# Patient Record
Sex: FEMALE | Race: BLACK OR AFRICAN AMERICAN | Employment: UNEMPLOYED | URBAN - METROPOLITAN AREA
[De-identification: names, ages, dates, MRNs, and addresses within clinical notes are randomized per-mention and may not be internally consistent; named-entity substitution may affect disease eponyms.]

---

## 2017-02-16 DIAGNOSIS — G40.019 LOCALIZATION-RELATED EPILEPSY, INTRACTABLE (HCC): ICD-10-CM

## 2017-02-17 RX ORDER — LACOSAMIDE 150 MG/1
TABLET, FILM COATED ORAL
Qty: 60 TAB | Refills: 2 | Status: SHIPPED | OUTPATIENT
Start: 2017-02-17 | End: 2017-02-20 | Stop reason: SDUPTHER

## 2017-02-20 ENCOUNTER — OFFICE VISIT (OUTPATIENT)
Dept: NEUROLOGY | Age: 62
End: 2017-02-20

## 2017-02-20 VITALS
WEIGHT: 180.4 LBS | SYSTOLIC BLOOD PRESSURE: 142 MMHG | OXYGEN SATURATION: 100 % | HEART RATE: 86 BPM | DIASTOLIC BLOOD PRESSURE: 92 MMHG | RESPIRATION RATE: 20 BRPM | HEIGHT: 63 IN | BODY MASS INDEX: 31.96 KG/M2

## 2017-02-20 DIAGNOSIS — G40.019 LOCALIZATION-RELATED EPILEPSY, INTRACTABLE (HCC): Primary | ICD-10-CM

## 2017-02-20 RX ORDER — LACOSAMIDE 150 MG/1
TABLET ORAL
Qty: 60 TAB | Refills: 5 | Status: SHIPPED | OUTPATIENT
Start: 2017-02-20 | End: 2017-08-14 | Stop reason: SDUPTHER

## 2017-02-20 RX ORDER — LEVETIRACETAM 1000 MG/1
TABLET ORAL
Qty: 210 TAB | Refills: 5 | Status: SHIPPED | OUTPATIENT
Start: 2017-02-20 | End: 2017-08-14 | Stop reason: SDUPTHER

## 2017-02-20 NOTE — MR AVS SNAPSHOT
Visit Information Date & Time Provider Department Dept. Phone Encounter #  
 2/20/2017  8:30 AM Henok Peralta NP Neurology [phone]  Upcoming Health Maintenance Date Due Hepatitis C Screening 1955 Pneumococcal 19-64 Medium Risk (1 of 1 - PPSV23) 11/18/1975 DTaP/Tdap/Td series (1 - Tdap) 11/18/1977 PAP AKA CERVICAL CYTOLOGY 11/18/1977 FOBT Q 1 YEAR AGE 50-75 11/18/2006 INFLUENZA AGE 9 TO ADULT 8/1/2016 ZOSTER VACCINE AGE 60> 11/18/2016 BREAST CANCER SCRN MAMMOGRAM 2/23/2018 Allergies as of 2/20/2017  Review Complete On: 2/20/2017 By: Henok Peralta NP No Known Allergies Current Immunizations  Never Reviewed No immunizations on file. Not reviewed this visit You Were Diagnosed With   
  
 Codes Comments Localization-related epilepsy, intractable ([facility].)    -  Primary ICD-10-CM: X58.254 ICD-9-CM: 345.51 Vitals BP Pulse Resp Height(growth percentile) Weight(growth percentile) SpO2  
 (!) 142/92 86 20 5' 2.5\" (1.588 m) 180 lb 6.4 oz (81.8 kg) 100% BMI OB Status Smoking Status 32.47 kg/m2 Hysterectomy Current Every Day Smoker Vitals History BMI and BSA Data Body Mass Index Body Surface Area  
 32.47 kg/m 2 1.9 m 2 Preferred Pharmacy Pharmacy Name Phone [facility] [address] [place] [address] [phone] Your Updated Medication List  
  
   
This list is accurate as of: 2/20/17  8:56 AM.  Always use your most recent med list.  
  
  
  
  
 acetaminophen 325 mg tablet Commonly known as:  TYLENOL Take  by mouth every four (4) hours as needed for Pain. albuterol 90 mcg/actuation inhaler Commonly known as:  PROVENTIL HFA, VENTOLIN HFA, PROAIR HFA Take 1-2 puffs by inhalation every four (4) hours as needed for Wheezing. amLODIPine 5 mg tablet Commonly known as:  Larue Sarah Take 5 mg by mouth daily. BONIVA 150 mg tablet Generic drug:  ibandronate Take 150 mg by mouth every thirty (30) days. calcium carbonate 200 mg calcium (500 mg) Chew Commonly known as:  TUMS Take 1 Tab by mouth two (2) times a day. FISH OIL 1,000 mg Cap Generic drug:  omega-3 fatty acids-vitamin e Take 1 Cap by mouth. 3 caps daily  
  
 hydroCHLOROthiazide 12.5 mg tablet Commonly known as:  HYDRODIURIL  
  
 lacosamide 150 mg Tab tablet Commonly known as:  VIMPAT TAKE 1 TABLET BY MOUTH TWICE DAILY  
  
 levETIRAcetam 1,000 mg tablet TAKE 3 AND 1/2 TABLETS BY MOUTH TWICE DAILY. Same generic for each refill  
  
 montelukast 10 mg tablet Commonly known as:  SINGULAIR  
daily. multivitamin tablet Commonly known as:  ONE A DAY Take 1 Tab by mouth daily. valsartan 80 mg tablet Commonly known as:  DIOVAN Take 80 mg by mouth daily. VITAMIN D2 PO Take 1,000 Units by mouth daily. Prescriptions Printed Refills  
 lacosamide (VIMPAT) 150 mg tab tablet 5 Sig: TAKE 1 TABLET BY MOUTH TWICE DAILY Class: Print Prescriptions Sent to Pharmacy Refills  
 levETIRAcetam 1,000 mg tablet 5 Sig: TAKE 3 AND 1/2 TABLETS BY MOUTH TWICE DAILY. Same generic for each refill Class: Normal  
 Pharmacy: [facility] [address] [place] [address] #: [phone] To-Do List   
 03/01/2017 3:00 PM  
  Appointment with [facility] at [facility] ([phone]) Shower or bathe using soap and water. Do not use deodorant, powder, perfumes, or lotion the day of your exam.  If your prior mammograms were not performed at [facility] 6 please bring films with you or forward prior images 2 days before your procedure.   Check in at registration 15min before your appointment time unless you were instructed to do otherwise. A script is not necessary, but if you have one, please bring it on the day of the mammogram or have it faxed to the department. [facility] [phone] [facility]  [phone] [facility] [address]  [phone] [facility] [phone] [address] [facility] Trixie Mcdonnell [phone] Patient Instructions A Healthy Lifestyle: Care Instructions Your Care Instructions A healthy lifestyle can help you feel good, stay at a healthy weight, and have plenty of energy for both work and play. A healthy lifestyle is something you can share with your whole family. A healthy lifestyle also can lower your risk for serious health problems, such as high blood pressure, heart disease, and diabetes. You can follow a few steps listed below to improve your health and the health of your family. Follow-up care is a key part of your treatment and safety. Be sure to make and go to all appointments, and call your doctor if you are having problems. Its also a good idea to know your test results and keep a list of the medicines you take. How can you care for yourself at home? · Do not eat too much sugar, fat, or fast foods. You can still have dessert and treats now and then. The goal is moderation. · Start small to improve your eating habits. Pay attention to portion sizes, drink less juice and soda pop, and eat more fruits and vegetables. ¨ Eat a healthy amount of food. A 3-ounce serving of meat, for example, is about the size of a deck of cards. Fill the rest of your plate with vegetables and whole grains. ¨ Limit the amount of soda and sports drinks you have every day. Drink more water when you are thirsty. ¨ Eat at least 5 servings of fruits and vegetables every day. It may seem like a lot, but it is not hard to reach this goal. A serving or helping is 1 piece of fruit, 1 cup of vegetables, or 2 cups of leafy, raw vegetables.  Have an apple or some carrot sticks as an afternoon snack instead of a candy bar. Try to have fruits and/or vegetables at every meal. 
· Make exercise part of your daily routine. You may want to start with simple activities, such as walking, bicycling, or slow swimming. Try to be active 30 to 60 minutes every day. You do not need to do all 30 to 60 minutes all at once. For example, you can exercise 3 times a day for 10 or 20 minutes. Moderate exercise is safe for most people, but it is always a good idea to talk to your doctor before starting an exercise program. 
· Keep moving. Hina Parody the lawn, work in the garden, or [employer]. Take the stairs instead of the elevator at work. · If you smoke, quit. People who smoke have an increased risk for heart attack, stroke, cancer, and other lung illnesses. Quitting is hard, but there are ways to boost your chance of quitting tobacco for good. ¨ Use nicotine gum, patches, or lozenges. ¨ Ask your doctor about stop-smoking programs and medicines. ¨ Keep trying. In addition to reducing your risk of diseases in the future, you will notice some benefits soon after you stop using tobacco. If you have shortness of breath or asthma symptoms, they will likely get better within a few weeks after you quit. · Limit how much alcohol you drink. Moderate amounts of alcohol (up to 2 drinks a day for men, 1 drink a day for women) are okay. But drinking too much can lead to liver problems, high blood pressure, and other health problems. Family health If you have a family, there are many things you can do together to improve your health. · Eat meals together as a family as often as possible. · Eat healthy foods. This includes fruits, vegetables, lean meats and dairy, and whole grains. · Include your family in your fitness plan. Most people think of activities such as jogging or tennis as the way to fitness, but there are many ways you and your family can be more active.  Anything that makes you breathe hard and gets your heart pumping is exercise. Here are some tips: 
¨ Walk to do errands or to take your child to school or the bus. ¨ Go for a family bike ride after dinner instead of watching TV. Where can you learn more? Go to http://ronny-janey.info/. Enter A035 in the search box to learn more about \"A Healthy Lifestyle: Care Instructions. \" Current as of: July 26, 2016 Content Version: 11.1 © 6234-6417 [employer]. Care instructions adapted under license by [employer] (which disclaims liability or warranty for this information). If you have questions about a medical condition or this instruction, always ask your healthcare professional. [address] any warranty or liability for your use of this information. Introducing [facility] & HEALTH SERVICES! Dear Reynaldo Vila: Thank you for requesting a Nexavis account. Our records indicate that you already have an active Nexavis account. You can access your account anytime at [email or web address] Did you know that you can access your hospital and ER discharge instructions at any time in Nexavis? You can also review all of your test results from your hospital stay or ER visit. Additional Information If you have questions, please visit the Frequently Asked Questions section of the Nexavis website at [email or web address] Remember, Nexavis is NOT to be used for urgent needs. For medical emergencies, dial 911. Now available from your iPhone and Android! Please provide this summary of care documentation to your next provider. Your primary care clinician is listed as Jake Walters. If you have any questions after today's visit, please call [phone].

## 2017-02-20 NOTE — PROGRESS NOTES
Date:  2017    Name:  Nancy Medeiros  :  1955  MRN:  [id number]     PCP:  Daren Fleischer., MD    Chief Complaint   Patient presents with    Epilepsy     HISTORY OF PRESENT ILLNESS: Follow up for epilepsy. Despite missing a couple of doses around the time that her father passed away, she has not had any seizures or seizure like activity. She denies having any seizures, loss of consciousness, dizziness, headaches, disturbance of sensation, changes in bowel or bladder, unusual fatigue, pain, chest pain or shortness of breath. States that she just has not felt good since her father . Current Outpatient Prescriptions   Medication Sig    VIMPAT 150 mg tab tablet TAKE 1 TABLET BY MOUTH TWICE DAILY    levETIRAcetam 1,000 mg tablet TAKE 3 AND 1/2 TABLETS BY MOUTH TWICE DAILY    Hydrochlorothiazide (HYDRODIURIL) 12.5 mg tablet     acetaminophen (TYLENOL) 325 mg tablet Take  by mouth every four (4) hours as needed for Pain.  amLODIPine (NORVASC) 5 mg tablet Take 5 mg by mouth daily.  valsartan (DIOVAN) 80 mg tablet Take 80 mg by mouth daily.  montelukast (SINGULAIR) 10 mg tablet daily.  albuterol (PROVENTIL HFA, VENTOLIN HFA, PROAIR HFA) 90 mcg/actuation inhaler Take 1-2 puffs by inhalation every four (4) hours as needed for Wheezing.  ibandronate (BONIVA) 150 mg tablet Take 150 mg by mouth every thirty (30) days.  calcium carbonate (TUMS) 200 mg calcium (500 mg) chew Take 1 Tab by mouth two (2) times a day.  multivitamin (ONE A DAY) tablet Take 1 Tab by mouth daily.  omega-3 fatty acids-vitamin e (FISH OIL) 1,000 mg cap Take 1 Cap by mouth. 3 caps daily    ERGOCALCIFEROL, VITAMIN D2, (VITAMIN D PO) Take 1,000 Units by mouth daily. No current facility-administered medications for this visit.       No Known Allergies  Past Medical History   Diagnosis Date    Arthritis     Asthma     Cervical cancer ([address]     Colon cancer [facility])      2016  Fibromyalgia     GERD (gastroesophageal reflux disease)     Hypertension     Osteoporosis     Seizures ([address]      LAST SEIZURE 2013    Ulcer [facility])      Past Surgical History   Procedure Laterality Date    Hx hysterectomy      Pr neurological procedure unlisted  2009     brain surgery for seizures    Hx colonoscopy  02/2016     Social History     Social History    Marital status:      Spouse name: N/A    Number of children: N/A    Years of education: N/A     Occupational History    Not on file. Social History Main Topics    Smoking status: Current Every Day Smoker     Packs/day: 0.50     Years: 35.00    Smokeless tobacco: Never Used    Alcohol use No    Drug use: No    Sexual activity: Not on file     Other Topics Concern    Not on file     Social History Narrative     Family History   Problem Relation Age of Onset    Dementia Mother     Cancer Mother      colon    Hypertension Mother     Dementia Father     Diabetes Father     Hypertension Father     Stroke Father     Crohn's Disease Father     Suicide Sister     Cancer Sister      breast       PHYSICAL EXAMINATION:    Visit Vitals    BP (!) 142/92    Pulse 86    Resp 20    Ht 5' 2.5\" (1.588 m)    Wt 81.8 kg (180 lb 6.4 oz)    SpO2 100%    BMI 32.47 kg/m2     General: Well defined, nourished, and groomed individual in no acute distress.    Neck: Supple, nontender, no bruits, no pain with resistance to active range of motion.    Heart: Regular rate and rhythm, no murmurs, rub, or gallop. Normal S1S2. Lungs: Clear to auscultation bilaterally with equal chest expansion, no cough, no wheeze  Musculoskeletal: Extremities revealed no edema and had full range of motion of joints.    Psych: Good mood and bright affect      NEUROLOGICAL EXAMINATION:    Mental Status: Alert and oriented to person, place, and time       Cranial Nerves:    II, III, IV, VI: Visual acuity grossly intact.  Visual fields are normal.    Pupils are equal, round, and reactive to light and accommodation.    Extra-ocular movements are full and fluid. Fundoscopic exam was benign, no ptosis or nystagmus.    V-XII: Hearing is grossly intact. Facial features are symmetric, with normal sensation and strength. The palate rises symmetrically and the tongue protrudes midline. Sternocleidomastoids 5/5.       Motor Examination: Normal tone, bulk, and strength, 5/5 muscle strength throughout.    Coordination: Finger to nose was normal. No resting or intention tremor  Gait and Station: Steady while walking. Normal arm swing. No pronator drift. No muscle wasting or fasiculations noted.    Reflexes: DTRs 2+ throughout    ASSESSMENT AND PLAN    ICD-10-CM ICD-9-CM    1. Localization-related epilepsy, intractable (HCC) G40.119 345.51 lacosamide (VIMPAT) 150 mg tab tablet      levETIRAcetam 1,000 mg tablet     Epilepsy is stable on present therapy of Vimpat and Keppra. Continue with the same . Follow up in six months    [facility].  Андрей Medina

## 2017-02-20 NOTE — PROGRESS NOTES
No seizures since last visit   She had missed like 2 doses back in December she had to go home since her daddy passed away   No breakthrough seizures because of the missed doses though

## 2017-02-20 NOTE — PATIENT INSTRUCTIONS

## 2017-03-15 DIAGNOSIS — G40.019 LOCALIZATION-RELATED EPILEPSY, INTRACTABLE (HCC): ICD-10-CM

## 2017-03-15 RX ORDER — LEVETIRACETAM 1000 MG/1
TABLET ORAL
Qty: 210 TAB | Refills: 0 | Status: SHIPPED | OUTPATIENT
Start: 2017-03-15 | End: 2017-08-14 | Stop reason: SDUPTHER

## 2017-04-05 ENCOUNTER — HOSPITAL ENCOUNTER (OUTPATIENT)
Dept: MAMMOGRAPHY | Age: 62
Discharge: HOME | End: 2017-04-05
Attending: FAMILY MEDICINE
Payer: MEDICARE

## 2017-04-05 DIAGNOSIS — Z12.31 VISIT FOR SCREENING MAMMOGRAM: ICD-10-CM

## 2017-04-05 PROCEDURE — 77067 SCR MAMMO BI INCL CAD: CPT

## 2017-08-14 ENCOUNTER — OFFICE VISIT (OUTPATIENT)
Dept: NEUROLOGY | Age: 62
End: 2017-08-14

## 2017-08-14 VITALS
DIASTOLIC BLOOD PRESSURE: 86 MMHG | BODY MASS INDEX: 31.63 KG/M2 | HEIGHT: 63 IN | WEIGHT: 178.5 LBS | HEART RATE: 74 BPM | SYSTOLIC BLOOD PRESSURE: 124 MMHG | OXYGEN SATURATION: 99 % | RESPIRATION RATE: 20 BRPM

## 2017-08-14 DIAGNOSIS — G40.019 LOCALIZATION-RELATED EPILEPSY, INTRACTABLE (HCC): Primary | ICD-10-CM

## 2017-08-14 RX ORDER — LEVETIRACETAM 1000 MG/1
TABLET ORAL
Qty: 210 TAB | Refills: 11 | Status: SHIPPED | OUTPATIENT
Start: 2017-08-14 | End: 2018-04-10 | Stop reason: SDUPTHER

## 2017-08-14 RX ORDER — LACOSAMIDE 150 MG/1
TABLET ORAL
Qty: 60 TAB | Refills: 11 | Status: SHIPPED | OUTPATIENT
Start: 2017-08-14 | End: 2018-02-25 | Stop reason: SDUPTHER

## 2017-08-14 NOTE — MR AVS SNAPSHOT
Visit Information Date & Time Provider Department Dept. Phone Encounter #  
 8/14/2017  8:30 AM Abena Harvey NP [facility] [phone] [id number] Follow-up Instructions Return in about 1 year (around 8/14/2018). Upcoming Health Maintenance Date Due Hepatitis C Screening 1955 Pneumococcal 19-64 Medium Risk (1 of 1 - PPSV23) 11/18/1975 DTaP/Tdap/Td series (1 - Tdap) 11/18/1977 PAP AKA CERVICAL CYTOLOGY 11/18/1977 FOBT Q 1 YEAR AGE 50-75 11/18/2006 ZOSTER VACCINE AGE 60> 9/18/2016 INFLUENZA AGE 9 TO ADULT 8/1/2017 BREAST CANCER SCRN MAMMOGRAM 4/5/2019 Allergies as of 8/14/2017  Review Complete On: 8/14/2017 By: Abena Harvey NP No Known Allergies Current Immunizations  Never Reviewed No immunizations on file. Not reviewed this visit You Were Diagnosed With   
  
 Codes Comments Localization-related epilepsy, intractable ([facility].)    -  Primary ICD-10-CM: H42.115 ICD-9-CM: 345.51 Vitals BP Pulse Resp Height(growth percentile) Weight(growth percentile) SpO2  
 124/86 74 20 5' 2.5\" (1.588 m) 178 lb 8 oz (81 kg) 99% BMI OB Status Smoking Status 32.13 kg/m2 Hysterectomy Current Every Day Smoker Vitals History BMI and BSA Data Body Mass Index Body Surface Area  
 32.13 kg/m 2 1.89 m 2 Preferred Pharmacy Pharmacy Name Phone [facility] [address] [phone] Your Updated Medication List  
  
   
This list is accurate as of: 8/14/17  8:56 AM.  Always use your most recent med list.  
  
  
  
  
 acetaminophen 325 mg tablet Commonly known as:  TYLENOL Take  by mouth every four (4) hours as needed for Pain. albuterol 90 mcg/actuation inhaler Commonly known as:  PROVENTIL HFA, VENTOLIN HFA, PROAIR HFA  
 Take 1-2 puffs by inhalation every four (4) hours as needed for Wheezing. amLODIPine 5 mg tablet Commonly known as:  Caralee Dupes Take 5 mg by mouth daily. BONIVA 150 mg tablet Generic drug:  ibandronate Take 150 mg by mouth every thirty (30) days. calcium carbonate 200 mg calcium (500 mg) Chew Commonly known as:  TUMS Take 1 Tab by mouth two (2) times a day. FISH OIL 1,000 mg Cap Generic drug:  omega-3 fatty acids-vitamin e Take 1 Cap by mouth. 3 caps daily  
  
 hydroCHLOROthiazide 12.5 mg tablet Commonly known as:  HYDRODIURIL  
  
 lacosamide 150 mg Tab tablet Commonly known as:  VIMPAT TAKE 1 TABLET BY MOUTH TWICE DAILY  
  
 levETIRAcetam 1,000 mg tablet TAKE 3 AND 1/2 TABLETS BY MOUTH TWICE DAILY. Same generic for each refill  
  
 montelukast 10 mg tablet Commonly known as:  SINGULAIR  
daily. multivitamin tablet Commonly known as:  ONE A DAY Take 1 Tab by mouth daily. valsartan 80 mg tablet Commonly known as:  DIOVAN Take 80 mg by mouth daily. VITAMIN D2 PO Take 1,000 Units by mouth daily. Prescriptions Printed Refills  
 lacosamide (VIMPAT) 150 mg tab tablet 11 Sig: TAKE 1 TABLET BY MOUTH TWICE DAILY Class: Print Prescriptions Sent to Pharmacy Refills  
 levETIRAcetam 1,000 mg tablet 11 Sig: TAKE 3 AND 1/2 TABLETS BY MOUTH TWICE DAILY. Same generic for each refill Class: Normal  
 Pharmacy: [facility] [address] [place] [address] #: [phone] Follow-up Instructions Return in about 1 year (around 8/14/2018). Patient Instructions A Healthy Lifestyle: Care Instructions Your Care Instructions A healthy lifestyle can help you feel good, stay at a healthy weight, and have plenty of energy for both work and play. A healthy lifestyle is something you can share with your whole family. A healthy lifestyle also can lower your risk for serious health problems, such as high blood pressure, heart disease, and diabetes. You can follow a few steps listed below to improve your health and the health of your family. Follow-up care is a key part of your treatment and safety. Be sure to make and go to all appointments, and call your doctor if you are having problems. Its also a good idea to know your test results and keep a list of the medicines you take. How can you care for yourself at home? · Do not eat too much sugar, fat, or fast foods. You can still have dessert and treats now and then. The goal is moderation. · Start small to improve your eating habits. Pay attention to portion sizes, drink less juice and soda pop, and eat more fruits and vegetables. ¨ Eat a healthy amount of food. A 3-ounce serving of meat, for example, is about the size of a deck of cards. Fill the rest of your plate with vegetables and whole grains. ¨ Limit the amount of soda and sports drinks you have every day. Drink more water when you are thirsty. ¨ Eat at least 5 servings of fruits and vegetables every day. It may seem like a lot, but it is not hard to reach this goal. A serving or helping is 1 piece of fruit, 1 cup of vegetables, or 2 cups of leafy, raw vegetables. Have an apple or some carrot sticks as an afternoon snack instead of a candy bar. Try to have fruits and/or vegetables at every meal. 
· Make exercise part of your daily routine. You may want to start with simple activities, such as walking, bicycling, or slow swimming. Try to be active 30 to 60 minutes every day. You do not need to do all 30 to 60 minutes all at once. For example, you can exercise 3 times a day for 10 or 20 minutes. Moderate exercise is safe for most people, but it is always a good idea to talk to your doctor before starting an exercise program. 
· Keep moving. Ciarra Alvares the lawn, work in the garden, or [employer].  Take the stairs instead of the elevator at work. · If you smoke, quit. People who smoke have an increased risk for heart attack, stroke, cancer, and other lung illnesses. Quitting is hard, but there are ways to boost your chance of quitting tobacco for good. ¨ Use nicotine gum, patches, or lozenges. ¨ Ask your doctor about stop-smoking programs and medicines. ¨ Keep trying. In addition to reducing your risk of diseases in the future, you will notice some benefits soon after you stop using tobacco. If you have shortness of breath or asthma symptoms, they will likely get better within a few weeks after you quit. · Limit how much alcohol you drink. Moderate amounts of alcohol (up to 2 drinks a day for men, 1 drink a day for women) are okay. But drinking too much can lead to liver problems, high blood pressure, and other health problems. Family health If you have a family, there are many things you can do together to improve your health. · Eat meals together as a family as often as possible. · Eat healthy foods. This includes fruits, vegetables, lean meats and dairy, and whole grains. · Include your family in your fitness plan. Most people think of activities such as jogging or tennis as the way to fitness, but there are many ways you and your family can be more active. Anything that makes you breathe hard and gets your heart pumping is exercise. Here are some tips: 
¨ Walk to do errands or to take your child to school or the bus. ¨ Go for a family bike ride after dinner instead of watching TV. Where can you learn more? Go to http://ronny-janey.info/. Enter J326 in the search box to learn more about \"A Healthy Lifestyle: Care Instructions. \" Current as of: July 26, 2016 Content Version: 11.3 © 3191-9206 [employer].  Care instructions adapted under license by [employer] (which disclaims liability or warranty for this information). If you have questions about a medical condition or this instruction, always ask your healthcare professional. [address] any warranty or liability for your use of this information. Introducing [facility] & HEALTH SERVICES! Dear Leslee Yeager: Thank you for requesting a [employer] account. Our records indicate that you already have an active [employer] account. You can access your account anytime at [email or web address] Did you know that you can access your hospital and ER discharge instructions at any time in [employer]? You can also review all of your test results from your hospital stay or ER visit. Additional Information If you have questions, please visit the Frequently Asked Questions section of the [employer] website at [email or web address] Remember, [employer] is NOT to be used for urgent needs. For medical emergencies, dial 911. Now available from your iPhone and Android! Please provide this summary of care documentation to your next provider. Your primary care clinician is listed as Lenora Patrick. If you have any questions after today's visit, please call [phone].

## 2017-08-14 NOTE — PROGRESS NOTES
Date:  17     Name:  Jeremy Gamboa  :  1955  MRN:  [id number]     PCP:  Erik Arellano MD    Chief Complaint   Patient presents with    Epilepsy     HISTORY OF PRESENT ILLNESS: Follow up for epilepsy. Continues to take Vimpat 150mg twice a day and Keppra 3500mg twice a day without difficulty. No seizures or seizure like activity. She denies any side effect or issue with potential toxicity such as dizziness, balance issues or clumsiness, confusion, sedation. She is always tired and has some depression a lot of which is related to worrying about her family. She has been exercising everyday and this seems to have made the arthritis act up some. Except as noted above, denies  fever, chills, cough. No CP or SOB. No dysuria, loss of bowel or bladder control. No Weight loss. Appetite good. Sleeping well. No sweats. No edema. No bruising or bleeding. No nausea or vomit. No diarrhea. No frequency, urgency, No depressive sxs. No anxiety. Denies sore throat, nasal congestion, nasal discharge, epistaxis, tinnitus, hearing loss. Current Outpatient Prescriptions   Medication Sig    lacosamide (VIMPAT) 150 mg tab tablet TAKE 1 TABLET BY MOUTH TWICE DAILY    levETIRAcetam 1,000 mg tablet TAKE 3 AND 1/2 TABLETS BY MOUTH TWICE DAILY. Same generic for each refill    Hydrochlorothiazide (HYDRODIURIL) 12.5 mg tablet     acetaminophen (TYLENOL) 325 mg tablet Take  by mouth every four (4) hours as needed for Pain.  amLODIPine (NORVASC) 5 mg tablet Take 5 mg by mouth daily.  valsartan (DIOVAN) 80 mg tablet Take 80 mg by mouth daily.  montelukast (SINGULAIR) 10 mg tablet daily.  albuterol (PROVENTIL HFA, VENTOLIN HFA, PROAIR HFA) 90 mcg/actuation inhaler Take 1-2 puffs by inhalation every four (4) hours as needed for Wheezing.  ibandronate (BONIVA) 150 mg tablet Take 150 mg by mouth every thirty (30) days.     calcium carbonate (TUMS) 200 mg calcium (500 mg) chew Take 1 Tab by mouth two (2) times a day.  multivitamin (ONE A DAY) tablet Take 1 Tab by mouth daily.  omega-3 fatty acids-vitamin e (FISH OIL) 1,000 mg cap Take 1 Cap by mouth. 3 caps daily    ERGOCALCIFEROL, VITAMIN D2, (VITAMIN D PO) Take 1,000 Units by mouth daily. No current facility-administered medications for this visit. No Known Allergies  Past Medical History:   Diagnosis Date    Arthritis     Asthma     [facility]     [facility])     February 2016    Fibromyalgia     GERD (gastroesophageal reflux disease)     Hypertension     Osteoporosis     Seizures ([facility]     LAST SEIZURE 2013    [facility]      Past Surgical History:   Procedure Laterality Date    HX COLONOSCOPY  02/2016    HX HYSTERECTOMY      NEUROLOGICAL PROCEDURE UNLISTED  2009    brain surgery for seizures     Social History     Social History    Marital status:      Spouse name: N/A    Number of children: N/A    Years of education: N/A     Occupational History    Not on file.      Social History Main Topics    Smoking status: Current Every Day Smoker     Packs/day: 0.50     Years: 35.00    Smokeless tobacco: Never Used    Alcohol use No    Drug use: No    Sexual activity: Not on file     Other Topics Concern    Not on file     Social History Narrative     Family History   Problem Relation Age of Onset    Dementia Mother     Cancer Mother      colon    Hypertension Mother     Dementia Father     Diabetes Father     Hypertension Father     Stroke Father     Crohn's Disease Father     Suicide Sister     Cancer Sister 46     breast       PHYSICAL EXAMINATION:    Visit Vitals    /86    Pulse 74    Resp 20    Ht 5' 2.5\" (1.588 m)    Wt 81 kg (178 lb 8 oz)    SpO2 99%    BMI 32.13 kg/m2     General: Well defined, nourished, and groomed individual in no acute distress.    Neck: Supple, nontender, no bruits, no pain with resistance to active range of motion.    Heart: Regular rate and rhythm, no murmurs, rub, or gallop. Normal S1S2. Lungs: Clear to auscultation bilaterally with equal chest expansion, no cough, no wheeze  Musculoskeletal: Extremities revealed no edema and had full range of motion of joints.    Psych: Good mood and bright affect      NEUROLOGICAL EXAMINATION:    Mental Status: Alert and oriented to person, place, and time       Cranial Nerves:    II, III, IV, VI: Visual acuity grossly intact. Visual fields are normal.    Pupils are equal, round, and reactive to light and accommodation.    Extra-ocular movements are full and fluid. Fundoscopic exam was benign, no ptosis or nystagmus.    V-XII: Hearing is grossly intact. Facial features are symmetric, with normal sensation and strength. The palate rises symmetrically and the tongue protrudes midline. Sternocleidomastoids 5/5.       Motor Examination: Normal tone, bulk, and strength, 5/5 muscle strength throughout.    Coordination: Finger to nose was normal. No resting or intention tremor  Gait and Station: Steady while walking. Normal arm swing. No pronator drift. No muscle wasting or fasiculations noted.    Reflexes: DTRs 2+ throughout    ASSESSMENT AND PLAN    ICD-10-CM ICD-9-CM    1. Localization-related epilepsy, intractable (HCC) G40.119 345.51 lacosamide (VIMPAT) 150 mg tab tablet      levETIRAcetam 1,000 mg tablet     Epilepsy is stable on present therapy of Keppra and Vimpat without any sign or symptoms of side effect or toxicity. Continue with this as previously prescribed. Follow up yearly or sooner if needed. Melyssa Coreas

## 2017-08-14 NOTE — PATIENT INSTRUCTIONS

## 2017-08-22 ENCOUNTER — TELEPHONE (OUTPATIENT)
Dept: NEUROLOGY | Age: 62
End: 2017-08-22

## 2018-04-10 DIAGNOSIS — G40.019 LOCALIZATION-RELATED EPILEPSY, INTRACTABLE (HCC): ICD-10-CM

## 2018-04-10 RX ORDER — LEVETIRACETAM 1000 MG/1
TABLET ORAL
Qty: 540 TAB | Refills: 11 | Status: SHIPPED | OUTPATIENT
Start: 2018-04-10 | End: 2018-09-18 | Stop reason: SDUPTHER

## 2018-04-11 ENCOUNTER — HOSPITAL ENCOUNTER (OUTPATIENT)
Dept: MAMMOGRAPHY | Age: 63
Discharge: HOME | End: 2018-04-11
Payer: MEDICARE

## 2018-04-11 ENCOUNTER — HOSPITAL ENCOUNTER (OUTPATIENT)
Dept: BONE DENSITY | Age: 63
Discharge: HOME | End: 2018-04-11
Payer: MEDICARE

## 2018-04-11 DIAGNOSIS — M81.0 OSTEOPOROSIS: ICD-10-CM

## 2018-04-11 DIAGNOSIS — Z12.31 VISIT FOR SCREENING MAMMOGRAM: ICD-10-CM

## 2018-04-11 PROCEDURE — 77080 DXA BONE DENSITY AXIAL: CPT

## 2018-04-11 PROCEDURE — 77067 SCR MAMMO BI INCL CAD: CPT

## 2018-06-22 DIAGNOSIS — G40.019 LOCALIZATION-RELATED EPILEPSY, INTRACTABLE (HCC): ICD-10-CM

## 2018-06-22 RX ORDER — LACOSAMIDE 150 MG/1
TABLET ORAL
Qty: 60 TAB | Refills: 3 | Status: SHIPPED | OUTPATIENT
Start: 2018-06-22 | End: 2018-09-18 | Stop reason: SDUPTHER

## 2018-06-22 NOTE — TELEPHONE ENCOUNTER
----- Message from Nish Live sent at 6/21/2018  3:38 PM EDT -----  Regarding: KELECHI Olson/rx refill  Pt (p) [phone] ,pt said she needs a rx refill for her Vimpat  For [facility] [phone], she has 3 more days of the Vimpact. .      She said she is unable to  the 3 rx for her Keppra , she only does one per month instead of three , she said they have only been giving her one at a time

## 2018-08-08 ENCOUNTER — HOSPITAL ENCOUNTER (OUTPATIENT)
Dept: INFUSION THERAPY | Age: 63
Discharge: HOME | End: 2018-08-08
Payer: MEDICARE

## 2018-08-08 VITALS
DIASTOLIC BLOOD PRESSURE: 68 MMHG | HEART RATE: 77 BPM | SYSTOLIC BLOOD PRESSURE: 109 MMHG | RESPIRATION RATE: 18 BRPM | TEMPERATURE: 98.1 F

## 2018-08-08 LAB
MAGNESIUM SERPL-MCNC: 1.8 MG/DL (ref 1.6–2.4)
PHOSPHATE SERPL-MCNC: 2.9 MG/DL (ref 2.6–4.7)

## 2018-08-08 PROCEDURE — 84100 ASSAY OF PHOSPHORUS: CPT | Performed by: FAMILY MEDICINE

## 2018-08-08 PROCEDURE — 96372 THER/PROPH/DIAG INJ SC/IM: CPT

## 2018-08-08 PROCEDURE — 74011250636 HC RX REV CODE- 250/636: Performed by: FAMILY MEDICINE

## 2018-08-08 PROCEDURE — 36415 COLL VENOUS BLD VENIPUNCTURE: CPT | Performed by: FAMILY MEDICINE

## 2018-08-08 PROCEDURE — 83735 ASSAY OF MAGNESIUM: CPT | Performed by: FAMILY MEDICINE

## 2018-08-08 RX ADMIN — DENOSUMAB 60 MG: 60 INJECTION SUBCUTANEOUS at 11:40

## 2018-08-08 NOTE — PROGRESS NOTES
Problem: Knowledge Deficit  Goal: *Verbalizes understanding of procedures and medications  Outcome: Progressing Towards Goal  Prolia. Discussed purpose and possible side effects. Pt verbalizes understanding.

## 2018-08-08 NOTE — PROGRESS NOTES
1055 Pt arrived ambulatory and in no distress for Prolia. Assessment complete. No new complaints voiced. Labs drawn peripherally without difficulty. Patient Vitals for the past 12 hrs:   Temp Pulse Resp BP   08/08/18 1057 98.1 °F (36.7 °C) 77 18 109/68     Recent Results (from the past 12 hour(s))   PHOSPHORUS    Collection Time: 08/08/18 11:08 AM   Result Value Ref Range    Phosphorus 2.9 2.6 - 4.7 MG/DL   MAGNESIUM    Collection Time: 08/08/18 11:08 AM   Result Value Ref Range    Magnesium 1.8 1.6 - 2.4 mg/dL     Renal panel completed at PCP's office; scanned into file. Medications:  Prolia 60 mg sq in right arm    1150 Discharged home ambulatory and in no distress. Next appointment February, 2019.

## 2018-09-18 ENCOUNTER — OFFICE VISIT (OUTPATIENT)
Dept: NEUROLOGY | Age: 63
End: 2018-09-18

## 2018-09-18 VITALS
WEIGHT: 182.5 LBS | HEIGHT: 62 IN | RESPIRATION RATE: 14 BRPM | DIASTOLIC BLOOD PRESSURE: 78 MMHG | BODY MASS INDEX: 33.58 KG/M2 | TEMPERATURE: 98.1 F | SYSTOLIC BLOOD PRESSURE: 110 MMHG | OXYGEN SATURATION: 98 % | HEART RATE: 88 BPM

## 2018-09-18 DIAGNOSIS — G40.019 LOCALIZATION-RELATED EPILEPSY, INTRACTABLE (HCC): Primary | ICD-10-CM

## 2018-09-18 RX ORDER — LEVETIRACETAM 1000 MG/1
TABLET ORAL
Qty: 540 TAB | Refills: 11 | Status: SHIPPED | OUTPATIENT
Start: 2018-09-18 | End: 2018-09-18 | Stop reason: SDUPTHER

## 2018-09-18 RX ORDER — LACOSAMIDE 150 MG/1
TABLET ORAL
Qty: 60 TAB | Refills: 3 | Status: SHIPPED | OUTPATIENT
Start: 2018-09-18 | End: 2019-03-04 | Stop reason: SDUPTHER

## 2018-09-18 RX ORDER — LEVETIRACETAM 1000 MG/1
3000 TABLET ORAL 2 TIMES DAILY
Qty: 540 TAB | Refills: 11 | Status: SHIPPED | OUTPATIENT
Start: 2018-09-18 | End: 2019-03-05 | Stop reason: SDUPTHER

## 2018-09-18 NOTE — PROGRESS NOTES
Date:  18     Name:  Master Ontiveros  :  1955  MRN:  [id number]     PCP:  Elvia Garcia MD    Chief Complaint   Patient presents with    Epilepsy     No Episodes since last visit.  Follow-up     HISTORY OF PRESENT ILLNESS: Patient presents today for yearly follow up of epilepsy. She continues to take Keppra but has reduced the dose to 3000mg twice a day citing an increase in the cost with the 3500mg dose twice a day. She is alsoDespite the reduction in the Keppra dose, she denies having any seizures or aura. She denies having any side effect or toxicity. She does have some mild issues with balance periodically as well as ongoing aches and pains in her joints related to arthritis. No other complaints. No new diagnosis, surgeries, or medications. Except as noted above, denies  fever, chills, cough. No CP or SOB. No dysuria, loss of bowel or bladder control. No Weight loss. Appetite good. Sleeping well. No sweats. No edema. No bruising or bleeding. No nausea or vomit. No diarrhea. No frequency, urgency, No depressive sxs. No anxiety. Denies sore throat, nasal congestion, nasal discharge, epistaxis, tinnitus, hearing loss, back pain, muscle pain, or joint pain. Current Outpatient Prescriptions   Medication Sig    ascorbate calcium (VITAMIN C PO) Take  by mouth daily.  lacosamide (VIMPAT) 150 mg tab tablet TAKE 1 TABLET BY MOUTH TWICE DAILY    levETIRAcetam 1,000 mg tablet TAKE 3 1/2 TABLETS BY MOUTH TWICE DAILY    Hydrochlorothiazide (HYDRODIURIL) 12.5 mg tablet     acetaminophen (TYLENOL) 325 mg tablet Take  by mouth every four (4) hours as needed for Pain.  montelukast (SINGULAIR) 10 mg tablet daily.  albuterol (PROVENTIL HFA, VENTOLIN HFA, PROAIR HFA) 90 mcg/actuation inhaler Take 1-2 puffs by inhalation every four (4) hours as needed for Wheezing.  calcium carbonate (TUMS) 200 mg calcium (500 mg) chew Take 1 Tab by mouth two (2) times a day.  multivitamin (ONE A DAY) tablet Take 1 Tab by mouth daily.  omega-3 fatty acids-vitamin e (FISH OIL) 1,000 mg cap Take 1 Cap by mouth. 3 caps daily    ERGOCALCIFEROL, VITAMIN D2, (VITAMIN D PO) Take 1,000 Units by mouth daily.  amLODIPine (NORVASC) 5 mg tablet Take 5 mg by mouth daily.  valsartan (DIOVAN) 80 mg tablet Take 80 mg by mouth daily.  ibandronate (BONIVA) 150 mg tablet Take 150 mg by mouth every thirty (30) days. No current facility-administered medications for this visit. Allergies   Allergen Reactions    Penicillins Itching     Past Medical History:   Diagnosis Date    Arthritis     Asthma     [facility]     [facility] cancer [facility])     February 2016    Fibromyalgia     GERD (gastroesophageal reflux disease)     Hypertension     Osteoporosis     Seizures ([facility]     LAST SEIZURE 2013    Ulcer      Past Surgical History:   Procedure Laterality Date    HX BREAST BIOPSY Right     negative surgical biopsy    HX COLONOSCOPY  02/2016    HX HYSTERECTOMY      NEUROLOGICAL PROCEDURE UNLISTED  2009    brain surgery for seizures     Social History     Social History    Marital status:      Spouse name: N/A    Number of children: N/A    Years of education: N/A     Occupational History    Not on file.      Social History Main Topics    Smoking status: Current Every Day Smoker     Packs/day: 0.50     Years: 35.00    Smokeless tobacco: Never Used    Alcohol use No    Drug use: No    Sexual activity: Not on file     Other Topics Concern    Not on file     Social History Narrative     Family History   Problem Relation Age of Onset    Dementia Mother     Cancer Mother      colon    Hypertension Mother     Dementia Father     Diabetes Father     Hypertension Father     Stroke Father     Crohn's Disease Father     Suicide Sister     Breast Cancer Sister      42's    Cancer Sister 46     breast       PHYSICAL EXAMINATION:    Visit Vitals    /78 (BP 1 Location: Left arm, BP Patient Position: Sitting)    Pulse 88    Temp 98.1 °F (36.7 °C) (Oral)    Resp 14    Ht 5' 2\" (1.575 m)    Wt 82.8 kg (182 lb 8 oz)    SpO2 98%    BMI 33.38 kg/m2     General: Well defined, nourished, and groomed individual in no acute distress.    Neck: Supple, nontender, no bruits, no pain with resistance to active range of motion.    Heart: Regular rate and rhythm, no murmurs, rub, or gallop. Normal S1S2. Lungs: Clear to auscultation bilaterally with equal chest expansion, no cough, no wheeze  Musculoskeletal: Extremities revealed no edema and had full range of motion of joints.    Psych: Good mood and bright affect      NEUROLOGICAL EXAMINATION:    Mental Status: Alert and oriented to person, place, and time       Cranial Nerves:    II, III, IV, VI: Visual acuity grossly intact. Visual fields are normal.    Pupils are equal, round, and reactive to light and accommodation.    Extra-ocular movements are full and fluid. Fundoscopic exam was benign, no ptosis or nystagmus.    V-XII: Hearing is grossly intact. Facial features are symmetric, with normal sensation and strength. The palate rises symmetrically and the tongue protrudes midline. Sternocleidomastoids 5/5.       Motor Examination: Normal tone, bulk, and strength, 5/5 muscle strength throughout.    Coordination: Finger to nose was normal. No resting or intention tremor  Gait and Station: Steady while walking. Normal arm swing. No pronator drift. No muscle wasting or fasiculations noted.    Reflexes: DTRs 2+ throughout    ASSESSMENT AND PLAN    ICD-10-CM ICD-9-CM    1. Localization-related epilepsy, intractable (HCC) G40.119 345.51 lacosamide (VIMPAT) 150 mg tab tablet      levETIRAcetam 1,000 mg tablet     Doing well on present therapy of Keppra 3000mg twice a day and Vimpat 150mg twice a day without side effect or sign of toxicity. No seizures or aura. Continue with medications as prescribed.  Follow up in one year or sooner if needed. Melyssa Albright [place]

## 2018-09-18 NOTE — MR AVS SNAPSHOT
[facility] [address] [facility] [address] [phone] Patient: Nabil Garner MRN: [id number] :1955 Visit Information Date & Time Provider Department Dept. Phone Encounter #  
 2018  9:30 AM Willa Reyes NP [facility] Neurology [facility] [phone] [id number] Follow-up Instructions Return in about 1 year (around 2019). Upcoming Health Maintenance Date Due Hepatitis C Screening 1955 Pneumococcal 19-64 Medium Risk (1 of 1 - PPSV23) 1975 DTaP/Tdap/Td series (1 - Tdap) 1977 PAP AKA CERVICAL CYTOLOGY 1977 FOBT Q 1 YEAR AGE 50-75 2006 ZOSTER VACCINE AGE 60> 2016 MEDICARE YEARLY EXAM 3/14/2018 Influenza Age 5 to Adult 2018 BREAST CANCER SCRN MAMMOGRAM 2020 Allergies as of 2018  Review Complete On: 2018 By: Willa Reyes NP Severity Noted Reaction Type Reactions Penicillins  2018    Itching Current Immunizations  Reviewed on 2018 Name Date Influenza Vaccine 2017 Not reviewed this visit You Were Diagnosed With   
  
 Codes Comments Localization-related [facility] int[facility]    -  Primary ICD-10-CM: N07.434 ICD-9-CM: 345.51 Vitals BP Pulse Temp Resp Height(growth percentile) Weight(growth percentile) 110/78 (BP 1 Location: Left arm, BP Patient Position: Sitting) 88 98.1 °F (36.7 °C) (Oral) 14 5' 2\" (1.575 m) 182 lb 8 oz (82.8 kg) SpO2 BMI OB Status Smoking Status 98% 33.38 kg/m2 Hysterectomy Current Every Day Smoker Vitals History BMI and BSA Data Body Mass Index Body Surface Area  
 33.38 kg/m 2 1.9 m 2 Preferred Pharmacy Pharmacy Name Phone [facility] [address] [phone] Your Updated Medication List  
  
   
 This list is accurate as of 9/18/18 10:17 AM.  Always use your most recent med list.  
  
  
  
  
 acetaminophen 325 mg tablet Commonly known as:  TYLENOL Take  by mouth every four (4) hours as needed for Pain. albuterol 90 mcg/actuation inhaler Commonly known as:  PROVENTIL HFA, VENTOLIN HFA, PROAIR HFA Take 1-2 puffs by inhalation every four (4) hours as needed for Wheezing. amLODIPine 5 mg tablet Commonly known as:  Zarco Jarett Take 5 mg by mouth daily. calcium carbonate 200 mg calcium (500 mg) Chew Commonly known as:  TUMS Take 1 Tab by mouth two (2) times a day. FISH OIL 1,000 mg Cap Generic drug:  omega-3 fatty acids-vitamin e Take 1 Cap by mouth. 3 caps daily  
  
 hydroCHLOROthiazide 12.5 mg tablet Commonly known as:  HYDRODIURIL  
  
 lacosamide 150 mg Tab tablet Commonly known as:  VIMPAT TAKE 1 TABLET BY MOUTH TWICE DAILY  
  
 levETIRAcetam 1,000 mg tablet Take 3 Tabs by mouth two (2) times a day. montelukast 10 mg tablet Commonly known as:  SINGULAIR  
daily. multivitamin tablet Commonly known as:  ONE A DAY Take 1 Tab by mouth daily. VITAMIN C PO Take  by mouth daily. VITAMIN D2 PO Take 1,000 Units by mouth daily. Prescriptions Printed Refills  
 lacosamide (VIMPAT) 150 mg tab tablet 3 Sig: TAKE 1 TABLET BY MOUTH TWICE DAILY Class: Print Prescriptions Sent to Pharmacy Refills  
 levETIRAcetam 1,000 mg tablet 11 Sig: Take 3 Tabs by mouth two (2) times a day. Class: Normal  
 Pharmacy: [facility] [address] [place] [address] #: [phone] Route: Oral  
  
Follow-up Instructions Return in about 1 year (around 9/18/2019). To-Do List   
 02/04/2019 11:00 AM  
  Appointment with [address] [facility] at [facility] ([phone]) Introducing [facility] SERVICES! Dear Navin Francie: Thank you for requesting a "[employer] account. Our records indicate that you already have an active "[employer] account. You can access your account anytime at [email or web address]. [employer] Did you know that you can access your hospital and ER discharge instructions at any time in "[employer]? You can also review all of your test results from your hospital stay or ER visit. Additional Information If you have questions, please visit the Frequently Asked Questions section of the "[employer] website at [email or web address]/. Remember, "[employer] is NOT to be used for urgent needs. For medical emergencies, dial 911. Now available from your iPhone and Android! Please provide this summary of care documentation to your next provider. Your primary care clinician is listed as Makenna Steele. If you have any questions after today's visit, please call [phone].

## 2018-09-18 NOTE — PROGRESS NOTES
Noemi Ngo is a 65 y.o. female    Chief Complaint   Patient presents with    Epilepsy     No Episodes since last visit.  Follow-up       1. Have you been to the ER, urgent care clinic since your last visit? Hospitalized since your last visit? No    2. Have you seen or consulted any other health care providers outside of the [address] [place] [address] since your last visit? Include any pap smears or colon screening.  No

## 2019-01-16 ENCOUNTER — TELEPHONE (OUTPATIENT)
Dept: NEUROLOGY | Age: 64
End: 2019-01-16

## 2019-01-16 NOTE — TELEPHONE ENCOUNTER
----- Message from Dylan Solano sent at 1/16/2019  3:34 PM EST -----  Regarding: Chirag Olson/Telephone  Pt is requesting a call back ASAP in reference to her medication \"Keppra\"/ Levetiracetam\" [employer] [phone]. Pt was told the medication was on back order and she runs out in within 2 days. Best contact number is [phone].

## 2019-01-22 ENCOUNTER — TELEPHONE (OUTPATIENT)
Dept: NEUROLOGY | Age: 64
End: 2019-01-22

## 2019-01-22 NOTE — TELEPHONE ENCOUNTER
----- Message from Herve Morrow sent at 1/22/2019  3:49 PM EST -----  Regarding: KELECHI Key / Rx refill  Contact: [phone]  Pt requested all future Rx refills sent to [facility] @ [phone].

## 2019-01-22 NOTE — TELEPHONE ENCOUNTER
----- Message from Adelaida Sever sent at 1/22/2019  3:49 PM EST -----  Regarding: KELECHI Finnegan / Rx refill  Contact: [phone]  Pt requested all future Rx refills sent to [address] [facility] @ [phone].

## 2019-01-31 NOTE — TELEPHONE ENCOUNTER
I think the 1000mg dose is the one on back order. She can take the 500mg tablet and she will need to take 6 of these twice a day for the equivalent dose of 3000mg twice a day (Routing comment) per NP. Left Message - on mobile number listed letting her know I was returning her call.

## 2019-02-04 ENCOUNTER — HOSPITAL ENCOUNTER (OUTPATIENT)
Dept: INFUSION THERAPY | Age: 64
Discharge: HOME | End: 2019-02-04
Payer: MEDICARE

## 2019-02-04 VITALS
DIASTOLIC BLOOD PRESSURE: 74 MMHG | RESPIRATION RATE: 18 BRPM | OXYGEN SATURATION: 99 % | TEMPERATURE: 97.5 F | SYSTOLIC BLOOD PRESSURE: 126 MMHG | HEART RATE: 84 BPM

## 2019-02-04 LAB
ALBUMIN SERPL-MCNC: 3.2 G/DL (ref 3.5–5)
ANION GAP SERPL CALC-SCNC: 10 MMOL/L (ref 5–15)
BUN SERPL-MCNC: 24 MG/DL (ref 6–20)
BUN/CREAT SERPL: 21 (ref 12–20)
CALCIUM SERPL-MCNC: 8.7 MG/DL (ref 8.5–10.1)
CHLORIDE SERPL-SCNC: 106 MMOL/L (ref 97–108)
CO2 SERPL-SCNC: 27 MMOL/L (ref 21–32)
CREAT SERPL-MCNC: 1.14 MG/DL (ref 0.55–1.02)
GLUCOSE SERPL-MCNC: 80 MG/DL (ref 65–100)
MAGNESIUM SERPL-MCNC: 1.8 MG/DL (ref 1.6–2.4)
PHOSPHATE SERPL-MCNC: 2.6 MG/DL (ref 2.6–4.7)
PHOSPHATE SERPL-MCNC: 2.6 MG/DL (ref 2.6–4.7)
POTASSIUM SERPL-SCNC: 3.8 MMOL/L (ref 3.5–5.1)
SODIUM SERPL-SCNC: 143 MMOL/L (ref 136–145)

## 2019-02-04 PROCEDURE — 96372 THER/PROPH/DIAG INJ SC/IM: CPT

## 2019-02-04 PROCEDURE — 74011250636 HC RX REV CODE- 250/636: Performed by: FAMILY MEDICINE

## 2019-02-04 PROCEDURE — 83735 ASSAY OF MAGNESIUM: CPT

## 2019-02-04 PROCEDURE — 36415 COLL VENOUS BLD VENIPUNCTURE: CPT

## 2019-02-04 PROCEDURE — 80069 RENAL FUNCTION PANEL: CPT

## 2019-02-04 PROCEDURE — 84100 ASSAY OF PHOSPHORUS: CPT

## 2019-02-04 RX ADMIN — DENOSUMAB 60 MG: 60 INJECTION SUBCUTANEOUS at 12:18

## 2019-02-04 NOTE — PROGRESS NOTES
Problem: Knowledge Deficit  Goal: *Verbalizes understanding of procedures and medications  Outcome: Progressing Towards Goal  Prolia. Pt denies any questions or concerns.

## 2019-02-04 NOTE — PROGRESS NOTES
1 Pt arrived at [facility] ambulatory and in no acute distress for Prolia. Patient Vitals for the past 12 hrs:   Temp Pulse Resp BP SpO2   02/04/19 1107 97.5 °F (36.4 °C) 84 18 126/74 99 %     Recent Results (from the past 12 hour(s))   PHOSPHORUS    Collection Time: 02/04/19 11:05 AM   Result Value Ref Range    Phosphorus 2.6 2.6 - 4.7 MG/DL   RENAL FUNCTION PANEL    Collection Time: 02/04/19 11:05 AM   Result Value Ref Range    Sodium 143 136 - 145 mmol/L    Potassium 3.8 3.5 - 5.1 mmol/L    Chloride 106 97 - 108 mmol/L    CO2 27 21 - 32 mmol/L    Anion gap 10 5 - 15 mmol/L    Glucose 80 65 - 100 mg/dL    BUN 24 (H) 6 - 20 MG/DL    Creatinine 1.14 (H) 0.55 - 1.02 MG/DL    BUN/Creatinine ratio 21 (H) 12 - 20      GFR est AA 59 (L) >60 ml/min/1.73m2    GFR est non-AA 48 (L) >60 ml/min/1.73m2    Calcium 8.7 8.5 - 10.1 MG/DL    Phosphorus 2.6 2.6 - 4.7 MG/DL    Albumin 3.2 (L) 3.5 - 5.0 g/dL   MAGNESIUM    Collection Time: 02/04/19 11:05 AM   Result Value Ref Range    Magnesium 1.8 1.6 - 2.4 mg/dL       Medications Administered     denosumab (PROLIA) injection 60 mg     Admin Date  02/04/2019 Action  Given Dose  60 mg Route  SubCUTAneous Administered By  Griffin Shipman, RN              1105 Tolerated injection well, no adverse reaction noted. D/Cd from [facility] ambulatory and in no acute distress. Pt aware of future appointments.     Future Appointments   Date Time Provider Rodriguez Romano   8/5/2019 11:00  [facility]   9/18/2019 10:00 AM KELECHI Olguin 27

## 2019-02-19 ENCOUNTER — TELEPHONE (OUTPATIENT)
Dept: NEUROLOGY | Age: 64
End: 2019-02-19

## 2019-02-19 NOTE — TELEPHONE ENCOUNTER
----- Message from Liliya Lewis sent at 2/19/2019 10:43 AM EST -----  Regarding: KELECHI Olson/Mayda  Pt stated she is unable to get ('Keppra\") at [facility] or [facility] anymore, and [facility] 2 different kinds, and would like to know if the doctor will suggest a different alternative. Best contact number ([phone].

## 2019-02-21 NOTE — TELEPHONE ENCOUNTER
Contacted patient back to let her know that per [facility], she can try and get it filled at a [facility] or she can go to the [facility] pharmacy and they have it. She verbalized understanding. She also stated that she wanted me to let Rajani Linda know that the vimpat co-pay is going up each month even with insurance.

## 2019-03-01 NOTE — TELEPHONE ENCOUNTER
Unfortunately, I have no control over the co-pay of the Vimpat.  That is actually an insurance issue per NP.

## 2019-03-04 DIAGNOSIS — G40.019 LOCALIZATION-RELATED EPILEPSY, INTRACTABLE (HCC): ICD-10-CM

## 2019-03-04 RX ORDER — LACOSAMIDE 150 MG/1
TABLET ORAL
Qty: 60 TAB | Refills: 0 | Status: SHIPPED | OUTPATIENT
Start: 2019-03-04 | End: 2019-03-31 | Stop reason: SDUPTHER

## 2019-03-04 NOTE — TELEPHONE ENCOUNTER
Pt needs  Refill of VIMPAT, she is good through tomorrow. She said she also needs a new RX sent to [facility] In her chart for KEPPRA. She said her [facility] pharmacy is no longer carrying it.

## 2019-03-05 DIAGNOSIS — G40.019 LOCALIZATION-RELATED EPILEPSY, INTRACTABLE (HCC): ICD-10-CM

## 2019-03-05 RX ORDER — LEVETIRACETAM 1000 MG/1
3000 TABLET ORAL 2 TIMES DAILY
Qty: 540 TAB | Refills: 11 | Status: SHIPPED | OUTPATIENT
Start: 2019-03-05 | End: 2019-04-11 | Stop reason: SDUPTHER

## 2019-03-06 RX ORDER — LACOSAMIDE 150 MG/1
TABLET ORAL
Qty: 60 TAB | Refills: 3 | OUTPATIENT
Start: 2019-03-06

## 2019-04-11 DIAGNOSIS — G40.019 LOCALIZATION-RELATED EPILEPSY, INTRACTABLE (HCC): ICD-10-CM

## 2019-04-11 RX ORDER — LEVETIRACETAM 1000 MG/1
TABLET ORAL
Qty: 4536 TAB | Refills: 0 | Status: SHIPPED | OUTPATIENT
Start: 2019-04-11 | End: 2019-09-18 | Stop reason: SDUPTHER

## 2019-04-15 ENCOUNTER — ANESTHESIA EVENT (OUTPATIENT)
Dept: ENDOSCOPY | Age: 64
End: 2019-04-15
Payer: MEDICARE

## 2019-04-15 NOTE — ANESTHESIA PREPROCEDURE EVALUATION
Relevant Problems   No relevant active problems       Anesthetic History   No history of anesthetic complications            Review of Systems / Medical History  Patient summary reviewed, nursing notes reviewed and pertinent labs reviewed    Pulmonary        Sleep apnea  Smoker  Asthma        Neuro/Psych     seizures         Cardiovascular    Hypertension                   GI/Hepatic/Renal     GERD      PUD     Endo/Other        Obesity, arthritis and cancer     Other Findings              Physical Exam    Airway  Mallampati: II  TM Distance: > 6 cm  Neck ROM: normal range of motion   Mouth opening: Normal     Cardiovascular  Regular rate and rhythm,  S1 and S2 normal,  no murmur, click, rub, or gallop             Dental    Dentition: Caps/crowns     Pulmonary  Breath sounds clear to auscultation               Abdominal  GI exam deferred       Other Findings            Anesthetic Plan    ASA: 2  Anesthesia type: MAC            Anesthetic plan and risks discussed with: Patient

## 2019-04-16 ENCOUNTER — HOSPITAL ENCOUNTER (OUTPATIENT)
Age: 64
Setting detail: OUTPATIENT SURGERY
Discharge: HOME | End: 2019-04-16
Attending: SPECIALIST | Admitting: SPECIALIST
Payer: MEDICARE

## 2019-04-16 ENCOUNTER — ANESTHESIA (OUTPATIENT)
Dept: ENDOSCOPY | Age: 64
End: 2019-04-16
Payer: MEDICARE

## 2019-04-16 VITALS
WEIGHT: 198.38 LBS | HEIGHT: 62 IN | SYSTOLIC BLOOD PRESSURE: 132 MMHG | RESPIRATION RATE: 18 BRPM | OXYGEN SATURATION: 100 % | DIASTOLIC BLOOD PRESSURE: 71 MMHG | HEART RATE: 71 BPM | BODY MASS INDEX: 36.5 KG/M2 | TEMPERATURE: 97.7 F

## 2019-04-16 PROCEDURE — 88305 TISSUE EXAM BY PATHOLOGIST: CPT

## 2019-04-16 PROCEDURE — 76040000007: Performed by: SPECIALIST

## 2019-04-16 PROCEDURE — 74011250637 HC RX REV CODE- 250/637: Performed by: SPECIALIST

## 2019-04-16 PROCEDURE — 77030027957 HC TBNG IRR ENDOGTR BUSS -B: Performed by: SPECIALIST

## 2019-04-16 PROCEDURE — 74011250636 HC RX REV CODE- 250/636

## 2019-04-16 PROCEDURE — 77030009426 HC FCPS BIOP ENDOSC BSC -B: Performed by: SPECIALIST

## 2019-04-16 PROCEDURE — 76060000032 HC ANESTHESIA 0.5 TO 1 HR: Performed by: SPECIALIST

## 2019-04-16 RX ORDER — IRBESARTAN 75 MG/1
75 TABLET ORAL
COMMUNITY
End: 2020-09-16

## 2019-04-16 RX ORDER — ATROPINE SULFATE 0.1 MG/ML
0.5 INJECTION INTRAVENOUS
Status: DISCONTINUED | OUTPATIENT
Start: 2019-04-16 | End: 2019-04-16 | Stop reason: HOSPADM

## 2019-04-16 RX ORDER — MIDAZOLAM HYDROCHLORIDE 1 MG/ML
.25-1 INJECTION, SOLUTION INTRAMUSCULAR; INTRAVENOUS
Status: DISCONTINUED | OUTPATIENT
Start: 2019-04-16 | End: 2019-04-16 | Stop reason: HOSPADM

## 2019-04-16 RX ORDER — FLUMAZENIL 0.1 MG/ML
0.2 INJECTION INTRAVENOUS
Status: DISCONTINUED | OUTPATIENT
Start: 2019-04-16 | End: 2019-04-16 | Stop reason: HOSPADM

## 2019-04-16 RX ORDER — SODIUM CHLORIDE 9 MG/ML
50 INJECTION, SOLUTION INTRAVENOUS CONTINUOUS
Status: DISCONTINUED | OUTPATIENT
Start: 2019-04-16 | End: 2019-04-16 | Stop reason: HOSPADM

## 2019-04-16 RX ORDER — NALOXONE HYDROCHLORIDE 0.4 MG/ML
0.4 INJECTION, SOLUTION INTRAMUSCULAR; INTRAVENOUS; SUBCUTANEOUS
Status: DISCONTINUED | OUTPATIENT
Start: 2019-04-16 | End: 2019-04-16 | Stop reason: HOSPADM

## 2019-04-16 RX ORDER — SODIUM CHLORIDE 0.9 % (FLUSH) 0.9 %
5-40 SYRINGE (ML) INJECTION EVERY 8 HOURS
Status: DISCONTINUED | OUTPATIENT
Start: 2019-04-16 | End: 2019-04-16 | Stop reason: HOSPADM

## 2019-04-16 RX ORDER — LIDOCAINE HYDROCHLORIDE 20 MG/ML
INJECTION, SOLUTION EPIDURAL; INFILTRATION; INTRACAUDAL; PERINEURAL AS NEEDED
Status: DISCONTINUED | OUTPATIENT
Start: 2019-04-16 | End: 2019-04-16 | Stop reason: HOSPADM

## 2019-04-16 RX ORDER — FENTANYL CITRATE 50 UG/ML
200 INJECTION, SOLUTION INTRAMUSCULAR; INTRAVENOUS
Status: DISCONTINUED | OUTPATIENT
Start: 2019-04-16 | End: 2019-04-16 | Stop reason: HOSPADM

## 2019-04-16 RX ORDER — PROPOFOL 10 MG/ML
INJECTION, EMULSION INTRAVENOUS AS NEEDED
Status: DISCONTINUED | OUTPATIENT
Start: 2019-04-16 | End: 2019-04-16 | Stop reason: HOSPADM

## 2019-04-16 RX ORDER — SODIUM CHLORIDE 9 MG/ML
INJECTION, SOLUTION INTRAVENOUS
Status: DISCONTINUED | OUTPATIENT
Start: 2019-04-16 | End: 2019-04-16 | Stop reason: HOSPADM

## 2019-04-16 RX ORDER — EPINEPHRINE 0.1 MG/ML
1 INJECTION INTRACARDIAC; INTRAVENOUS
Status: DISCONTINUED | OUTPATIENT
Start: 2019-04-16 | End: 2019-04-16 | Stop reason: HOSPADM

## 2019-04-16 RX ORDER — SODIUM CHLORIDE 0.9 % (FLUSH) 0.9 %
5-40 SYRINGE (ML) INJECTION AS NEEDED
Status: DISCONTINUED | OUTPATIENT
Start: 2019-04-16 | End: 2019-04-16 | Stop reason: HOSPADM

## 2019-04-16 RX ORDER — DEXTROMETHORPHAN/PSEUDOEPHED 2.5-7.5/.8
1.2 DROPS ORAL
Status: DISCONTINUED | OUTPATIENT
Start: 2019-04-16 | End: 2019-04-16 | Stop reason: HOSPADM

## 2019-04-16 RX ADMIN — SIMETHICONE 80 MG: 20 SUSPENSION/ DROPS ORAL at 10:19

## 2019-04-16 RX ADMIN — PROPOFOL 50 MG: 10 INJECTION, EMULSION INTRAVENOUS at 10:22

## 2019-04-16 RX ADMIN — PROPOFOL 50 MG: 10 INJECTION, EMULSION INTRAVENOUS at 10:16

## 2019-04-16 RX ADMIN — PROPOFOL 50 MG: 10 INJECTION, EMULSION INTRAVENOUS at 10:10

## 2019-04-16 RX ADMIN — SODIUM CHLORIDE: 9 INJECTION, SOLUTION INTRAVENOUS at 10:00

## 2019-04-16 RX ADMIN — PROPOFOL 100 MG: 10 INJECTION, EMULSION INTRAVENOUS at 10:05

## 2019-04-16 RX ADMIN — LIDOCAINE HYDROCHLORIDE 100 MG: 20 INJECTION, SOLUTION EPIDURAL; INFILTRATION; INTRACAUDAL; PERINEURAL at 10:05

## 2019-04-16 NOTE — ROUTINE PROCESS
Gloria Floyd  1955  [id number]    Situation:  Verbal report received from:Procedure : Yaquelin Malloy RN    Background:    Preoperative diagnosis: HX COLON POLYPS  Postoperative diagnosis: 1.- Colonic Polyp  2.- Diverticulosis    :  Dr. Tere Joyce): Endoscopy Technician-1: Zina Winkler  Endoscopy RN-1: Jada Samuel RN    Specimens:   ID Type Source Tests Collected by Time Destination   1 : Rectal Polyp Preservative   Jessie Jones MD 4/16/2019 1012 Pathology     H. Pylori  no    Assessment:  Intra-procedure medications     Anesthesia gave intra-procedure sedation and medications, see anesthesia flow sheet yes    Intravenous fluids: NS@ KVO     Vital signs stable     Abdominal assessment: round and soft     Recommendation:  Discharge patient per MD order.     Family or Friend   Permission to share finding with family or friend yes

## 2019-04-16 NOTE — H&P
Colonoscopy History and Physical      The patient was seen and examined. Date of last colonoscopy: 2016, Polyps  Yes      The airway was assessed and documented. The problem list, past medical history, and medications were reviewed.      Patient Active Problem List   Diagnosis Code    Localization-related epilepsy, intractable ([facility] G40.119    MCI (mild cognitive impairment) G31.84    Obstructive sleep apnea (adult) (pediatric) G47.33     Social History     Socioeconomic History    Marital status:      Spouse name: Not on file    Number of children: Not on file    Years of education: Not on file    Highest education level: Not on file   Occupational History    Not on file   Social Needs    Financial resource strain: Not on file    Food insecurity:     Worry: Not on file     Inability: Not on file    Transportation needs:     Medical: Not on file     Non-medical: Not on file   Tobacco Use    Smoking status: Current Every Day Smoker     Packs/day: 0.50     Years: 35.00     Pack years: 17.50    Smokeless tobacco: Never Used   Substance and Sexual Activity    Alcohol use: No    Drug use: No    Sexual activity: Not on file   Lifestyle    Physical activity:     Days per week: Not on file     Minutes per session: Not on file    Stress: Not on file   Relationships    Social connections:     Talks on phone: Not on file     Gets together: Not on file     Attends Zoroastrianism service: Not on file     Active member of club or organization: Not on file     Attends meetings of clubs or organizations: Not on file     Relationship status: Not on file    Intimate partner violence:     Fear of current or ex partner: Not on file     Emotionally abused: Not on file     Physically abused: Not on file     Forced sexual activity: Not on file   Other Topics Concern    Not on file   Social History Narrative    Not on file     Past Medical History:   Diagnosis Date    Arthritis     Asthma     Cervical cancer ([facility]  Colon cancer [facility])     February 2016    Fibromyalgia     GERD (gastroesophageal reflux disease)     Hypertension     Osteoporosis     Seizures ([address]     LAST SEIZURE 2013    Ulcer          Prior to Admission Medications   Prescriptions Last Dose Informant Patient Reported? Taking? ERGOCALCIFEROL, VITAMIN D2, (VITAMIN D PO) 4/15/2019 at Unknown time  Yes Yes   Sig: Take 1,000 Units by mouth daily. Hydrochlorothiazide (HYDRODIURIL) 12.5 mg tablet 4/15/2019 at Unknown time  Yes Yes   acetaminophen (TYLENOL) 325 mg tablet 4/13/2019  Yes No   Sig: Take  by mouth every four (4) hours as needed for Pain. albuterol (PROVENTIL HFA, VENTOLIN HFA, PROAIR HFA) 90 mcg/actuation inhaler Unknown at Unknown time  Yes No   Sig: Take 1-2 puffs by inhalation every four (4) hours as needed for Wheezing. amLODIPine (NORVASC) 5 mg tablet 4/15/2019 at Unknown time  Yes Yes   Sig: Take 5 mg by mouth daily. ascorbate calcium (VITAMIN C PO) Not Taking at Unknown time  Yes No   Sig: Take  by mouth daily. calcium carbonate (TUMS) 200 mg calcium (500 mg) chew Unknown at Unknown time  Yes No   Sig: Take 1 Tab by mouth two (2) times a day. irbesartan (AVAPRO) 75 mg tablet 4/15/2019 at Unknown time  Yes Yes   Sig: Take 75 mg by mouth nightly. lacosamide (VIMPAT) 150 mg tab tablet 4/15/2019 at Unknown time  No Yes   Sig: TAKE 1 TABLET BY MOUTH TWICE DAILY   levETIRAcetam 1,000 mg tablet 4/15/2019 at Unknown time  No Yes   Sig: TAKE 3 AND 1/2 TABLETS BY MOUTH TWICE A DAY   montelukast (SINGULAIR) 10 mg tablet 4/15/2019 at Unknown time  Yes Yes   Sig: daily. multivitamin (ONE A DAY) tablet 4/15/2019 at Unknown time  Yes Yes   Sig: Take 1 Tab by mouth daily. omega-3 fatty acids-vitamin e (FISH OIL) 1,000 mg cap 4/15/2019 at Unknown time  Yes Yes   Sig: Take 1 Cap by mouth. 3 caps daily      Facility-Administered Medications: None       The patient was seen and examined in the endoscopy suite.  The airway was assessed and docuemented. The problem list and medications were reviewed. Chief complaint, history of present illness, and review of systems and Past medical History are positive for:sleep apnea and colon polyps     The heart, lungs and mental status were satisfactory for the administration of sedation and for the procedure. I discussed with the patient the objectives, risks, consequences and alternatives to the procedure.      Plan: Endoscopic procedure with sedation     Rachana Shoemaker MD   4/16/2019  10:07 AM

## 2019-04-16 NOTE — DISCHARGE INSTRUCTIONS
Maru Veloz  [id number]  1955    COLON DISCHARGE INSTRUCTIONS  Discomfort:  Redness at IV site- apply warm compress to area; if redness or soreness persist- contact your physician  There may be a slight amount of blood passed from the rectum  Gaseous discomfort- walking, belching will help relieve any discomfort  You may not operate a vehicle for 12 hours  You may not engage in an occupation involving machinery or appliances for rest of today  You may not drink alcoholic beverages for at least 12 hours  Avoid making any critical decisions for at least 24 hour  DIET:   High fiber diet. - however -  remember your colon is empty and a heavy meal will produce gas. Avoid these foods:  vegetables, fried / greasy foods, carbonated drinks for today. MEDICATIONS:      Regarding Aspirin or Nonsteroidal medications, please see below. ACTIVITY:  You may resume your normal daily activities it is recommended that you spend the remainder of the day resting -  avoid any strenuous activity. CALL M.D. ANY SIGN OF:  Increasing pain, nausea, vomiting  Abdominal distension (swelling)  New increased bleeding (oral or rectal)  Fever (chills)  Pain in chest area  Bloody discharge from nose or mouth  Shortness of breath    You may not  take any Advil, Aspirin, Ibuprofen, Motrin, Aleve, or Goodys for 10 days, ONLY  Tylenol as needed for pain.     Post procedure diagnosis: 1.- Colonic Polyp  2.- Diverticulosis      Follow-up Instructions:   Call Dr. Santosh Seaman  Results of procedure / biopsy in 10 days  Telephone #  [phone]      DISCHARGE SUMMARY from Nurse    The following personal items collected during your admission are returned to you:   Dental Appliance: Dental Appliances: None  Vision: Visual Aid: Glasses  Hearing Aid:    Earl Geronimota:    Clothing:    Other Valuables:    Valuables sent to safe:    Patient Education   Patient Education        Diverticulosis: Care Instructions  Your Care Instructions  In diverticulosis, pouches called diverticula form in the wall of the large intestine (colon). The pouches do not cause any pain or other symptoms. Most people who have diverticulosis do not know they have it. But the pouches sometimes bleed, and if they become infected, they can cause pain and other symptoms. When this happens, it is called diverticulitis. Diverticula form when pressure pushes the wall of the colon outward at certain weak points. A diet that is too low in fiber can cause diverticula. Follow-up care is a key part of your treatment and safety. Be sure to make and go to all appointments, and call your doctor if you are having problems. It's also a good idea to know your test results and keep a list of the medicines you take. How can you care for yourself at home? · Include fruits, leafy green vegetables, beans, and whole grains in your diet each day. These foods are high in fiber. · Take a fiber supplement, such as Citrucel or Metamucil, every day if needed. Read and follow all instructions on the label. · Drink plenty of fluids, enough so that your urine is light yellow or clear like water. If you have kidney, heart, or liver disease and have to limit fluids, talk with your doctor before you increase the amount of fluids you drink. · Get at least 30 minutes of exercise on most days of the week. Walking is a good choice. You also may want to do other activities, such as running, swimming, cycling, or playing tennis or team sports. · Cut out foods that cause gas, pain, or other symptoms. When should you call for help?   Call your doctor now or seek immediate medical care if:    · You have belly pain.     · You pass maroon or very bloody stools.     · You have a fever.     · You have nausea and vomiting.     · You have unusual changes in your bowel movements or abdominal swelling.     · You have burning pain when you urinate.     · You have abnormal vaginal discharge.     · You have shoulder pain.     · You have cramping pain that does not get better when you have a bowel movement or pass gas.     · You pass gas or stool from your urethra while urinating.    Watch closely for changes in your health, and be sure to contact your doctor if you have any problems. Where can you learn more? Go to http://ronny-janey.info/. Enter K158 in the search box to learn more about \"Diverticulosis: Care Instructions. \"  Current as of: March 27, 2018  Content Version: 11.9  © 7278-8035 [employer]. Care instructions adapted under license by [employer] (which disclaims liability or warranty for this information). If you have questions about a medical condition or this instruction, always ask your healthcare professional. [address] any warranty or liability for your use of this information. Colon Polyps: Care Instructions  Your Care Instructions    Colon polyps are growths in the colon or the rectum. The cause of most colon polyps is not known, and most people who get them do not have any problems. But a certain kind can turn into cancer. For this reason, regular testing for colon polyps is important for people age 48 and older and anyone who has an increased risk for colon cancer. Polyps are usually found through routine colon cancer screening tests. Although most colon polyps are not cancerous, they are usually removed and then tested for cancer. Screening for colon cancer saves lives because the cancer can usually be cured if it is caught early. If you have a polyp that is the type that can turn into cancer, you may need more tests to examine your entire colon. The doctor will remove any other polyps that he or she finds, and you will be tested more often. Follow-up care is a key part of your treatment and safety. Be sure to make and go to all appointments, and call your doctor if you are having problems.  It's also a good idea to know your test results and keep a list of the medicines you take. How can you care for yourself at home? Regular exams to look for colon polyps are the best way to prevent polyps from turning into colon cancer. These can include stool tests, sigmoidoscopy, colonoscopy, and CT colonography. Talk with your doctor about a testing schedule that is right for you. To prevent polyps  There is no home treatment that can prevent colon polyps. But these steps may help lower your risk for cancer. · Stay active. Being active can help you get to and stay at a healthy weight. Try to exercise on most days of the week. Walking is a good choice. · Eat well. Choose a variety of vegetables, fruits, legumes (such as peas and beans), fish, poultry, and whole grains. · Do not smoke. If you need help quitting, talk to your doctor about stop-smoking programs and medicines. These can increase your chances of quitting for good. · If you drink alcohol, limit how much you drink. Limit alcohol to 2 drinks a day for men and 1 drink a day for women. When should you call for help? Call your doctor now or seek immediate medical care if:    · You have severe belly pain.     · Your stools are maroon or very bloody.    Watch closely for changes in your health, and be sure to contact your doctor if:    · You have a fever.     · You have nausea or vomiting.     · You have a change in bowel habits (new constipation or diarrhea).     · Your symptoms get worse or are not improving as expected. Where can you learn more? Go to http://ronny-janey.info/. Enter 95 180291 in the search box to learn more about \"Colon Polyps: Care Instructions. \"  Current as of: March 27, 2018  Content Version: 11.9  © 9372-2169 [employer]. Care instructions adapted under license by [employer] (which disclaims liability or warranty for this information).  If you have questions about a medical condition or this instruction, always ask your healthcare professional. [address] any warranty or liability for your use of this information.

## 2019-04-16 NOTE — PROCEDURES
[address] [facility]  [address]                 Colonoscopy Procedure Note    Indications:   See Preoperative Diagnosis above  Referring Physician: Miguel A Jiménez MD  Anesthesia/Sedation: MAC anesthesia Propofol  Endoscopist:  Dr. Betito Arias  Assistant:  Endoscopy Technician-1: Mohan Sharma  Endoscopy RN-1: Sharmin Medrano RN  Preoperative diagnosis: HX COLON POLYPS  Postoperative diagnosis: 1.- Colonic Polyp  2.- Diverticulosis    Procedure in Detail:  Informed consent was obtained for the procedure, including sedation. Risks of perforation, hemorrhage, adverse drug reaction, and aspiration were discussed. The patient was placed in the left lateral decubitus position. Based on the pre-procedure assessment, including review of the patient's medical history, medications, allergies, and review of systems, she had been deemed to be an appropriate candidate for moderate sedation; she was therefore sedated with the medications listed above. The patient was monitored continuously with ECG tracing, pulse oximetry, blood pressure monitoring, and direct observations. A rectal examination was performed. The [id number] was inserted into the rectum and advanced under direct vision to the cecum, which was identified by the ileocecal valve and appendiceal orifice. The quality of the colonic preparation was fair. A careful inspection was made as the colonoscope was withdrawn, including a retroflexed view of the rectum; findings and interventions are described below. Appropriate photodocumentation was obtained. Findings:  Rectum: 3 mm polyp removed with cold biopsy. Sigmoid: moderate diverticulosis; Descending Colon: moderate diverticulosis;  Transverse Colon: moderate diverticulosis; Ascending Colon: normal  Cecum: normal    Specimens:    rectal polyp    EBL: None    Complications: None; patient tolerated the procedure well.     Recommendations: - Await pathology. - Repeat colonoscopy in 5 years. - High fiber diet.         Signed By: Armani Jonas MD                        April 16, 2019

## 2019-04-16 NOTE — PROGRESS NOTES
Assumed care of the patient at the time of this note from Srini Ennis CRNA. Patient transported to recovery by Endoscopy Procedure RN. SBAR report given to recovery RN.    Jacob Velarde- technician who did the pre-cleaning of the scope

## 2019-04-16 NOTE — ANESTHESIA POSTPROCEDURE EVALUATION
Post-Anesthesia Evaluation and Assessment    Patient: Nabil Garner MRN: [id number]  SSN: [id number]    YOB: 1955  Age: 59 y.o. Sex: female      I have evaluated the patient and they are stable and ready for discharge from the PACU. Cardiovascular Function/Vital Signs  Visit Vitals  /71   Pulse 71   Temp 36.5 °C (97.7 °F)   Resp 18   Ht 5' 2\" (1.575 m)   Wt 90 kg (198 lb 6 oz)   SpO2 100%   Breastfeeding? No   BMI 36.28 kg/m²       Patient is status post MAC anesthesia for Procedure(s):  COLONOSCOPY  ENDOSCOPIC POLYPECTOMY. Nausea/Vomiting: None    Postoperative hydration reviewed and adequate. Pain:  Pain Scale 1: Numeric (0 - 10) (04/16/19 1056)  Pain Intensity 1: 0 (04/16/19 1056)   Managed    Neurological Status: At baseline    Mental Status, Level of Consciousness: Alert and  oriented to person, place, and time    Pulmonary Status:   O2 Device: Room air (04/16/19 1056)   Adequate oxygenation and airway patent    Complications related to anesthesia: None    Post-anesthesia assessment completed. No concerns    Signed By: Kay Erickson MD     April 16, 2019              Procedure(s):  COLONOSCOPY  ENDOSCOPIC POLYPECTOMY. MAC    <BSHSIANPOST>    Vitals Value Taken Time   BP 0/0 4/16/2019 11:31 AM   Temp 36.5 °C (97.7 °F) 4/16/2019 10:40 AM   Pulse 0 4/16/2019 11:31 AM   Resp 0 4/16/2019 11:36 AM   SpO2 97 % 4/16/2019 10:57 AM   Vitals shown include unvalidated device data.

## 2019-04-19 ENCOUNTER — HOSPITAL ENCOUNTER (OUTPATIENT)
Dept: MAMMOGRAPHY | Age: 64
Discharge: HOME | End: 2019-04-19
Payer: MEDICARE

## 2019-04-19 DIAGNOSIS — Z12.39 BREAST SCREENING: ICD-10-CM

## 2019-04-19 PROCEDURE — 77067 SCR MAMMO BI INCL CAD: CPT

## 2019-05-12 ENCOUNTER — HOSPITAL ENCOUNTER (EMERGENCY)
Age: 64
Discharge: HOME | End: 2019-05-12
Attending: EMERGENCY MEDICINE
Payer: MEDICARE

## 2019-05-12 VITALS
DIASTOLIC BLOOD PRESSURE: 83 MMHG | BODY MASS INDEX: 36.76 KG/M2 | HEART RATE: 74 BPM | RESPIRATION RATE: 24 BRPM | TEMPERATURE: 97.4 F | OXYGEN SATURATION: 100 % | SYSTOLIC BLOOD PRESSURE: 136 MMHG | WEIGHT: 201 LBS

## 2019-05-12 DIAGNOSIS — R42 DIZZINESS: Primary | ICD-10-CM

## 2019-05-12 DIAGNOSIS — R11.2 NON-INTRACTABLE VOMITING WITH NAUSEA, UNSPECIFIED VOMITING TYPE: ICD-10-CM

## 2019-05-12 DIAGNOSIS — R42 VERTIGO: ICD-10-CM

## 2019-05-12 LAB
ALBUMIN SERPL-MCNC: 3.4 G/DL (ref 3.5–5)
ALBUMIN/GLOB SERPL: 0.8 {RATIO} (ref 1.1–2.2)
ALP SERPL-CCNC: 119 U/L (ref 45–117)
ALT SERPL-CCNC: 23 U/L (ref 12–78)
ANION GAP SERPL CALC-SCNC: 12 MMOL/L (ref 5–15)
APPEARANCE UR: ABNORMAL
AST SERPL-CCNC: 16 U/L (ref 15–37)
BACTERIA URNS QL MICRO: ABNORMAL /HPF
BASOPHILS # BLD: 0.1 K/UL (ref 0–0.1)
BASOPHILS NFR BLD: 1 % (ref 0–1)
BILIRUB SERPL-MCNC: 0.2 MG/DL (ref 0.2–1)
BILIRUB UR QL: NEGATIVE
BUN SERPL-MCNC: 23 MG/DL (ref 6–20)
BUN/CREAT SERPL: 20 (ref 12–20)
CALCIUM SERPL-MCNC: 9.4 MG/DL (ref 8.5–10.1)
CHLORIDE SERPL-SCNC: 103 MMOL/L (ref 97–108)
CO2 SERPL-SCNC: 26 MMOL/L (ref 21–32)
COLOR UR: ABNORMAL
CREAT SERPL-MCNC: 1.16 MG/DL (ref 0.55–1.02)
DIFFERENTIAL METHOD BLD: ABNORMAL
EOSINOPHIL # BLD: 0 K/UL (ref 0–0.4)
EOSINOPHIL NFR BLD: 0 % (ref 0–7)
EPITH CASTS URNS QL MICRO: ABNORMAL /LPF
ERYTHROCYTE [DISTWIDTH] IN BLOOD BY AUTOMATED COUNT: 12.8 % (ref 11.5–14.5)
GLOBULIN SER CALC-MCNC: 4.5 G/DL (ref 2–4)
GLUCOSE SERPL-MCNC: 188 MG/DL (ref 65–100)
GLUCOSE UR STRIP.AUTO-MCNC: NEGATIVE MG/DL
HCT VFR BLD AUTO: 41.3 % (ref 35–47)
HGB BLD-MCNC: 13.4 G/DL (ref 11.5–16)
HGB UR QL STRIP: NEGATIVE
IMM GRANULOCYTES # BLD AUTO: 0 K/UL (ref 0–0.04)
IMM GRANULOCYTES NFR BLD AUTO: 0 % (ref 0–0.5)
KETONES UR QL STRIP.AUTO: NEGATIVE MG/DL
LEUKOCYTE ESTERASE UR QL STRIP.AUTO: ABNORMAL
LIPASE SERPL-CCNC: 182 U/L (ref 73–393)
LYMPHOCYTES # BLD: 1.9 K/UL (ref 0.8–3.5)
LYMPHOCYTES NFR BLD: 18 % (ref 12–49)
MCH RBC QN AUTO: 29.5 PG (ref 26–34)
MCHC RBC AUTO-ENTMCNC: 32.4 G/DL (ref 30–36.5)
MCV RBC AUTO: 91 FL (ref 80–99)
MONOCYTES # BLD: 0.4 K/UL (ref 0–1)
MONOCYTES NFR BLD: 3 % (ref 5–13)
NEUTS SEG # BLD: 8.5 K/UL (ref 1.8–8)
NEUTS SEG NFR BLD: 78 % (ref 32–75)
NITRITE UR QL STRIP.AUTO: NEGATIVE
NRBC # BLD: 0 K/UL (ref 0–0.01)
NRBC BLD-RTO: 0 PER 100 WBC
PH UR STRIP: 7.5 [PH] (ref 5–8)
PLATELET # BLD AUTO: 284 K/UL (ref 150–400)
PMV BLD AUTO: 9.5 FL (ref 8.9–12.9)
POTASSIUM SERPL-SCNC: 3.8 MMOL/L (ref 3.5–5.1)
PROT SERPL-MCNC: 7.9 G/DL (ref 6.4–8.2)
PROT UR STRIP-MCNC: NEGATIVE MG/DL
RBC # BLD AUTO: 4.54 M/UL (ref 3.8–5.2)
RBC #/AREA URNS HPF: ABNORMAL /HPF (ref 0–5)
SODIUM SERPL-SCNC: 141 MMOL/L (ref 136–145)
SP GR UR REFRACTOMETRY: 1.02 (ref 1–1.03)
UROBILINOGEN UR QL STRIP.AUTO: 0.2 EU/DL (ref 0.2–1)
WBC # BLD AUTO: 10.9 K/UL (ref 3.6–11)
WBC URNS QL MICRO: ABNORMAL /HPF (ref 0–4)

## 2019-05-12 PROCEDURE — 80053 COMPREHEN METABOLIC PANEL: CPT

## 2019-05-12 PROCEDURE — 96361 HYDRATE IV INFUSION ADD-ON: CPT

## 2019-05-12 PROCEDURE — 83690 ASSAY OF LIPASE: CPT

## 2019-05-12 PROCEDURE — 81001 URINALYSIS AUTO W/SCOPE: CPT

## 2019-05-12 PROCEDURE — 96374 THER/PROPH/DIAG INJ IV PUSH: CPT

## 2019-05-12 PROCEDURE — 74011250636 HC RX REV CODE- 250/636: Performed by: EMERGENCY MEDICINE

## 2019-05-12 PROCEDURE — 99283 EMERGENCY DEPT VISIT LOW MDM: CPT

## 2019-05-12 PROCEDURE — 85025 COMPLETE CBC W/AUTO DIFF WBC: CPT

## 2019-05-12 PROCEDURE — 36415 COLL VENOUS BLD VENIPUNCTURE: CPT

## 2019-05-12 RX ORDER — MECLIZINE HCL 12.5 MG 12.5 MG/1
25 TABLET ORAL
Status: COMPLETED | OUTPATIENT
Start: 2019-05-12 | End: 2019-05-12

## 2019-05-12 RX ORDER — MECLIZINE HYDROCHLORIDE 25 MG/1
25 TABLET ORAL
Qty: 20 TAB | Refills: 0 | Status: SHIPPED | OUTPATIENT
Start: 2019-05-12 | End: 2020-09-16

## 2019-05-12 RX ORDER — ONDANSETRON 4 MG/1
4 TABLET, ORALLY DISINTEGRATING ORAL
Qty: 10 TAB | Refills: 0 | Status: SHIPPED | OUTPATIENT
Start: 2019-05-12 | End: 2020-09-16

## 2019-05-12 RX ORDER — ONDANSETRON 2 MG/ML
4 INJECTION INTRAMUSCULAR; INTRAVENOUS
Status: COMPLETED | OUTPATIENT
Start: 2019-05-12 | End: 2019-05-12

## 2019-05-12 RX ADMIN — MECLIZINE 25 MG: 12.5 TABLET ORAL at 04:19

## 2019-05-12 RX ADMIN — SODIUM CHLORIDE 1000 ML: 900 INJECTION, SOLUTION INTRAVENOUS at 04:25

## 2019-05-12 RX ADMIN — ONDANSETRON 4 MG: 2 INJECTION INTRAMUSCULAR; INTRAVENOUS at 04:20

## 2019-05-12 NOTE — ED NOTES
Pt presents to ED ambulatory complaining of N/V/D  And dizziness onset at 11pm last night. Pt is alert and oriented x 4, RR even and unlabored, skin is warm and dry. Assessment completed and pt updated on plan of care. Safety measures in place, call light within reach. Emergency Department Nursing Plan of Care       The Nursing Plan of Care is developed from the Nursing assessment and Emergency Department Attending provider initial evaluation. The plan of care may be reviewed in the ED Provider note.     The Plan of Care was developed with the following considerations:   Patient / Family readiness to learn indicated by:verbalized understanding  Persons(s) to be included in education: patient  Barriers to Learning/Limitations:No    Signed     Geraldine Loges    5/12/2019   4:05 AM

## 2019-05-12 NOTE — ED PROVIDER NOTES
EMERGENCY DEPARTMENT HISTORY AND PHYSICAL EXAM      Date: 5/12/2019  Patient Name: Justice Recee    History of Presenting Illness     Chief Complaint   Patient presents with    Abdominal Pain       History Provided By: Patient    HPI: Justice Reece, 59 y.o. female with PMHx significant for multiple medical problems, presents by private vehicle to the ED with cc of dizziness nausea vomiting and diarrhea. This is a 60-year-old female with nausea vomiting and diarrhea for the past 6 hours. She now has severe dizziness that actually started prior to the nausea vomiting. She has a history of vertigo as well as multiple other medical problems including diabetes fibromyalgia and gastroesophageal reflux. There are no other complaints, changes, or physical findings at this time. PCP: Donal Jeronimo MD    Current Outpatient Medications   Medication Sig Dispense Refill    ondansetron (ZOFRAN ODT) 4 mg disintegrating tablet Take 1 Tab by mouth every eight (8) hours as needed for Nausea. 10 Tab 0    meclizine (ANTIVERT) 25 mg tablet Take 1 Tab by mouth three (3) times daily as needed for Dizziness. 20 Tab 0    irbesartan (AVAPRO) 75 mg tablet Take 75 mg by mouth nightly.  levETIRAcetam 1,000 mg tablet TAKE 3 AND 1/2 TABLETS BY MOUTH TWICE A DAY 4536 Tab 0    lacosamide (VIMPAT) 150 mg tab tablet TAKE 1 TABLET BY MOUTH TWICE DAILY 60 Tab 3    ascorbate calcium (VITAMIN C PO) Take  by mouth daily.  Hydrochlorothiazide (HYDRODIURIL) 12.5 mg tablet   1    montelukast (SINGULAIR) 10 mg tablet daily.  multivitamin (ONE A DAY) tablet Take 1 Tab by mouth daily.  omega-3 fatty acids-vitamin e (FISH OIL) 1,000 mg cap Take 1 Cap by mouth. 3 caps daily      ERGOCALCIFEROL, VITAMIN D2, (VITAMIN D PO) Take 1,000 Units by mouth daily.  acetaminophen (TYLENOL) 325 mg tablet Take  by mouth every four (4) hours as needed for Pain.       amLODIPine (NORVASC) 5 mg tablet Take 5 mg by mouth daily.  albuterol (PROVENTIL HFA, VENTOLIN HFA, PROAIR HFA) 90 mcg/actuation inhaler Take 1-2 puffs by inhalation every four (4) hours as needed for Wheezing.  calcium carbonate (TUMS) 200 mg calcium (500 mg) chew Take 1 Tab by mouth two (2) times a day. Past History     Past Medical History:  Past Medical History:   Diagnosis Date    Arthritis     Asthma     [facility]     [facility] cancer [facility])     2016    Fibromyalgia     GERD (gastroesophageal reflux disease)     Hypertension     Osteoporosis     [facility]     LAST SEIZURE 2013    Ulcer        Past Surgical History:  Past Surgical History:   Procedure Laterality Date    COLONOSCOPY N/A 2019    COLONOS[facility] performed [facility] Aden Potts [facility] at [facility] END[facility]COPY    HX BREAST BIOPSY Right     negative surgical biopsy    HX COLONOSCOPY  2016    HX HYSTERECTOMY      NEUROLOGICAL PROCEDURE UNLISTED      brain surgery for seizures       Family History:  Family History   Problem Relation Age of Onset    Dementia Mother     Cancer Mother         colon    Hypertension Mother     Dementia Father     Diabetes Father     Hypertension Father     Stroke Father     Crohn's Disease Father     Suicide Sister     Breast Cancer Sister         42's    Cancer Sister 46        breast       Social History:  Social History     Tobacco Use    Smoking status: Former Smoker     Packs/day: 0.50     Years: 35.00     Pack years: 17.50     Last attempt to quit: 2019     Years since quittin.2    Smokeless tobacco: Never Used   Substance Use Topics    Alcohol use: No    Drug use: No       Allergies: Allergies   Allergen Reactions    Penicillins Itching         Review of Systems   Review of Systems   Constitutional: Negative for chills and fever. HENT: Negative for congestion, rhinorrhea, sneezing and sore throat. Respiratory: Negative for shortness of breath. Cardiovascular: Negative for chest pain. Gastrointestinal: Positive for diarrhea, nausea and vomiting. Negative for abdominal pain. Musculoskeletal: Negative for back pain, myalgias and neck stiffness. Skin: Negative for rash. Neurological: Positive for dizziness. Negative for weakness and headaches. All other systems reviewed and are negative. Physical Exam   Physical Exam   Constitutional: She is oriented to person, place, and time. She appears well-developed and well-nourished. HENT:   Head: Normocephalic and atraumatic. Mouth/Throat: Oropharynx is clear and moist.   Eyes: Conjunctivae and EOM are normal.   Neck: Normal range of motion and full passive range of motion without pain. Neck supple. Cardiovascular: Normal rate, regular rhythm, S1 normal, S2 normal, normal heart sounds, intact distal pulses and normal pulses. No murmur heard. Pulmonary/Chest: Effort normal and breath sounds normal. No respiratory distress. She has no wheezes. Abdominal: Soft. Normal appearance and bowel sounds are normal. She exhibits no distension. There is no tenderness. There is no rebound. Musculoskeletal: Normal range of motion. Neurological: She is alert and oriented to person, place, and time. She has normal strength. Skin: Skin is warm, dry and intact. No rash noted. Psychiatric: She has a normal mood and affect. Her speech is normal and behavior is normal. Judgment and thought content normal.   Nursing note and vitals reviewed.       Diagnostic Study Results     Labs -  Recent Results (from the past 24 hour(s))   CBC WITH AUTOMATED DIFF    Collection Time: 05/12/19  4:12 AM   Result Value Ref Range    WBC 10.9 3.6 - 11.0 K/uL    RBC 4.54 3.80 - 5.20 M/uL    HGB 13.4 11.5 - 16.0 g/dL    HCT 41.3 35.0 - 47.0 %    MCV 91.0 80.0 - 99.0 FL    MCH 29.5 26.0 - 34.0 PG    MCHC 32.4 30.0 - 36.5 g/dL    RDW 12.8 11.5 - 14.5 %    PLATELET 315 627 - 901 K/uL    MPV 9.5 8.9 - 12.9 FL    NRBC 0.0 0  WBC    ABSOLUTE NRBC 0.00 0.00 - 0.01 K/uL NEUTROPHILS 78 (H) 32 - 75 %    LYMPHOCYTES 18 12 - 49 %    MONOCYTES 3 (L) 5 - 13 %    EOSINOPHILS 0 0 - 7 %    BASOPHILS 1 0 - 1 %    IMMATURE GRANULOCYTES 0 0.0 - 0.5 %    ABS. NEUTROPHILS 8.5 (H) 1.8 - 8.0 K/UL    ABS. LYMPHOCYTES 1.9 0.8 - 3.5 K/UL    ABS. MONOCYTES 0.4 0.0 - 1.0 K/UL    ABS. EOSINOPHILS 0.0 0.0 - 0.4 K/UL    ABS. BASOPHILS 0.1 0.0 - 0.1 K/UL    ABS. IMM. GRANS. 0.0 0.00 - 0.04 K/UL    DF AUTOMATED     METABOLIC PANEL, COMPREHENSIVE    Collection Time: 05/12/19  4:12 AM   Result Value Ref Range    Sodium 141 136 - 145 mmol/L    Potassium 3.8 3.5 - 5.1 mmol/L    Chloride 103 97 - 108 mmol/L    CO2 26 21 - 32 mmol/L    Anion gap 12 5 - 15 mmol/L    Glucose 188 (H) 65 - 100 mg/dL    BUN 23 (H) 6 - 20 MG/DL    Creatinine 1.16 (H) 0.55 - 1.02 MG/DL    BUN/Creatinine ratio 20 12 - 20      GFR est AA 57 (L) >60 ml/min/1.73m2    GFR est non-AA 47 (L) >60 ml/min/1.73m2    Calcium 9.4 8.5 - 10.1 MG/DL    Bilirubin, total 0.2 0.2 - 1.0 MG/DL    ALT (SGPT) 23 12 - 78 U/L    AST (SGOT) 16 15 - 37 U/L    Alk.  phosphatase 119 (H) 45 - 117 U/L    Protein, total 7.9 6.4 - 8.2 g/dL    Albumin 3.4 (L) 3.5 - 5.0 g/dL    Globulin 4.5 (H) 2.0 - 4.0 g/dL    A-G Ratio 0.8 (L) 1.1 - 2.2     LIPASE    Collection Time: 05/12/19  4:12 AM   Result Value Ref Range    Lipase 182 73 - 393 U/L   URINALYSIS W/ RFLX MICROSCOPIC    Collection Time: 05/12/19  6:19 AM   Result Value Ref Range    Color YELLOW/STRAW      Appearance CLOUDY (A) CLEAR      Specific gravity 1.020 1.003 - 1.030      pH (UA) 7.5 5.0 - 8.0      Protein NEGATIVE  NEG mg/dL    Glucose NEGATIVE  NEG mg/dL    Ketone NEGATIVE  NEG mg/dL    Bilirubin NEGATIVE  NEG      Blood NEGATIVE  NEG      Urobilinogen 0.2 0.2 - 1.0 EU/dL    Nitrites NEGATIVE  NEG      Leukocyte Esterase TRACE (A) NEG     URINE MICROSCOPIC ONLY    Collection Time: 05/12/19  6:19 AM   Result Value Ref Range    WBC 0-4 0 - 4 /hpf    RBC 0-5 0 - 5 /hpf    Epithelial cells FEW FEW /lpf Bacteria 1+ (A) NEG /hpf       Radiologic Studies -   No orders to display     CT Results  (Last 48 hours)    None        CXR Results  (Last 48 hours)    None        EKG: normal EKG, normal sinus rhythm, normal sinus rhythm. Medical Decision Making   I am the first provider for this patient. I reviewed the vital signs, available nursing notes, past medical history, past surgical history, family history and social history. Vital Signs-Reviewed the patient's vital signs. No data found. Records Reviewed: Nursing Notes    Provider Notes (Medical Decision Making):   Vertigo versus gastroenteritis    ED Course:   Initial assessment performed. The patients presenting problems have been discussed, and they are in agreement with the care plan formulated and outlined with them. I have encouraged them to ask questions as they arise throughout their visit. Patient did very well in the ER with meclizine and Zofran. Her dizziness was greatly improved and she was informed of her normal labs and was suitable for discharge to home. Disposition:  Patient informed of results of workup and is comfortable with discharge to home to follow up with PCP. They are instructed to return as needed for worsening condition. PLAN:  1.    Discharge Medication List as of 5/12/2019  6:55 AM      START taking these medications    Details   ondansetron (ZOFRAN ODT) 4 mg disintegrating tablet Take 1 Tab by mouth every eight (8) hours as needed for Nausea., Normal, Disp-10 Tab, R-0      meclizine (ANTIVERT) 25 mg tablet Take 1 Tab by mouth three (3) times daily as needed for Dizziness., Normal, Disp-20 Tab, R-0         CONTINUE these medications which have NOT CHANGED    Details   irbesartan (AVAPRO) 75 mg tablet Take 75 mg by mouth nightly., Historical Med      levETIRAcetam 1,000 mg tablet TAKE 3 AND 1/2 TABLETS BY MOUTH TWICE A DAY, Normal, Disp-4536 Tab, R-0      lacosamide (VIMPAT) 150 mg tab tablet TAKE 1 TABLET BY MOUTH TWICE DAILY, Print, Disp-60 Tab, R-3      ascorbate calcium (VITAMIN C PO) Take  by mouth daily. , Historical Med      Hydrochlorothiazide (HYDRODIURIL) 12.5 mg tablet Historical Med, R-1      montelukast (SINGULAIR) 10 mg tablet daily. , Historical Med      multivitamin (ONE A DAY) tablet Take 1 Tab by mouth daily. , Historical Med      omega-3 fatty acids-vitamin e (FISH OIL) 1,000 mg cap Take 1 Cap by mouth. 3 caps daily, Historical Med      ERGOCALCIFEROL, VITAMIN D2, (VITAMIN D PO) Take 1,000 Units by mouth daily. , Historical Med      acetaminophen (TYLENOL) 325 mg tablet Take  by mouth every four (4) hours as needed for Pain., Historical Med      amLODIPine (NORVASC) 5 mg tablet Take 5 mg by mouth daily. , Historical Med      albuterol (PROVENTIL HFA, VENTOLIN HFA, PROAIR HFA) 90 mcg/actuation inhaler Take 1-2 puffs by inhalation every four (4) hours as needed for Wheezing., Historical Med      calcium carbonate (TUMS) 200 mg calcium (500 mg) chew Take 1 Tab by mouth two (2) times a day., Historical Med           2. Follow-up Information     Follow up With Specialties Details Why Contact Info    Roseann Ventura., MD [facility] Schedule an appointment as soon as possible for a visit  [address] [place] [address]  [phone]      [facility] EMERGENCY DEPT Emergency Medicine  As needed, If symptoms worsen [facility]  [phone]        Return to ED if worse     Diagnosis     Clinical Impression:   1. Dizziness    2. Non-intractable vomiting with nausea, unspecified vomiting type    3.  Vertigo

## 2019-05-12 NOTE — DISCHARGE INSTRUCTIONS
Patient Education        Dizziness: Care Instructions  Your Care Instructions  Dizziness is the feeling of unsteadiness or fuzziness in your head. It is different than having vertigo, which is a feeling that the room is spinning or that you are moving or falling. It is also different from lightheadedness, which is the feeling that you are about to faint. It can be hard to know what causes dizziness. Some people feel dizzy when they have migraine headaches. Sometimes bouts of flu can make you feel dizzy. Some medical conditions, such as heart problems or high blood pressure, can make you feel dizzy. Many medicines can cause dizziness, including medicines for high blood pressure, pain, or anxiety. If a medicine causes your symptoms, your doctor may recommend that you stop or change the medicine. If it is a problem with your heart, you may need medicine to help your heart work better. If there is no clear reason for your symptoms, your doctor may suggest watching and waiting for a while to see if the dizziness goes away on its own. Follow-up care is a key part of your treatment and safety. Be sure to make and go to all appointments, and call your doctor if you are having problems. It's also a good idea to know your test results and keep a list of the medicines you take. How can you care for yourself at home? · If your doctor recommends or prescribes medicine, take it exactly as directed. Call your doctor if you think you are having a problem with your medicine. · Do not drive while you feel dizzy. · Try to prevent falls. Steps you can take include:  ? Using nonskid mats, adding grab bars near the tub, and using night-lights. ? Clearing your home so that walkways are free of anything you might trip on.  ? Letting family and friends know that you have been feeling dizzy. This will help them know how to help you. When should you call for help? Call 911 anytime you think you may need emergency care.  For example, call if:    · You passed out (lost consciousness).     · You have dizziness along with symptoms of a heart attack. These may include:  ? Chest pain or pressure, or a strange feeling in the chest.  ? Sweating. ? Shortness of breath. ? Nausea or vomiting. ? Pain, pressure, or a strange feeling in the back, neck, jaw, or upper belly or in one or both shoulders or arms. ? Lightheadedness or sudden weakness. ? A fast or irregular heartbeat.     · You have symptoms of a stroke. These may include:  ? Sudden numbness, tingling, weakness, or loss of movement in your face, arm, or leg, especially on only one side of your body. ? Sudden vision changes. ? Sudden trouble speaking. ? Sudden confusion or trouble understanding simple statements. ? Sudden problems with walking or balance. ? A sudden, severe headache that is different from past headaches.    Call your doctor now or seek immediate medical care if:    · You feel dizzy and have a fever, headache, or ringing in your ears.     · You have new or increased nausea and vomiting.     · Your dizziness does not go away or comes back.    Watch closely for changes in your health, and be sure to contact your doctor if:    · You do not get better as expected. Where can you learn more? Go to http://ronny-janey.info/. Enter T186 in the search box to learn more about \"Dizziness: Care Instructions. \"  Current as of: September 23, 2018  Content Version: 11.9  © 1505-2030 [employer]. Care instructions adapted under license by [employer] (which disclaims liability or warranty for this information). If you have questions about a medical condition or this instruction, always ask your healthcare professional. [place] [address] any warranty or liability for your use of this information.          Patient Education        Nausea and Vomiting: Care Instructions  Your Care Instructions    When you are nauseated, you may feel weak and sweaty and notice a lot of saliva in your mouth. Nausea often leads to vomiting. Most of the time you do not need to worry about nausea and vomiting, but they can be signs of other illnesses. Two common causes of nausea and vomiting are stomach flu and food poisoning. Nausea and vomiting from viral stomach flu will usually start to improve within 24 hours. Nausea and vomiting from food poisoning may last from 12 to 48 hours. The doctor has checked you carefully, but problems can develop later. If you notice any problems or new symptoms, get medical treatment right away. Follow-up care is a key part of your treatment and safety. Be sure to make and go to all appointments, and call your doctor if you are having problems. It's also a good idea to know your test results and keep a list of the medicines you take. How can you care for yourself at home? · To prevent dehydration, drink plenty of fluids, enough so that your urine is light yellow or clear like water. Choose water and other caffeine-free clear liquids until you feel better. If you have kidney, heart, or liver disease and have to limit fluids, talk with your doctor before you increase the amount of fluids you drink. · Rest in bed until you feel better. · When you are able to eat, try clear soups, mild foods, and liquids until all symptoms are gone for 12 to 48 hours. Other good choices include dry toast, crackers, cooked cereal, and gelatin dessert, such as Jell-O. When should you call for help? Call 911 anytime you think you may need emergency care. For example, call if:    · You passed out (lost consciousness).    Call your doctor now or seek immediate medical care if:    · You have symptoms of dehydration, such as:  ? Dry eyes and a dry mouth. ? Passing only a little dark urine. ?  Feeling thirstier than usual.     · You have new or worsening belly pain.     · You have a new or higher fever.     · You vomit blood or what looks like coffee grounds.    Watch closely for changes in your health, and be sure to contact your doctor if:    · You have ongoing nausea and vomiting.     · Your vomiting is getting worse.     · Your vomiting lasts longer than 2 days.     · You are not getting better as expected. Where can you learn more? Go to http://ronny-janey.info/. Enter 25 110816 in the search box to learn more about \"Nausea and Vomiting: Care Instructions. \"  Current as of: September 23, 2018  Content Version: 11.9  © 7130-2474 [employer]. Care instructions adapted under license by [employer] (which disclaims liability or warranty for this information). If you have questions about a medical condition or this instruction, always ask your healthcare professional. [address] any warranty or liability for your use of this information.

## 2019-05-13 ENCOUNTER — APPOINTMENT (OUTPATIENT)
Dept: CT IMAGING | Age: 64
End: 2019-05-13
Attending: EMERGENCY MEDICINE
Payer: MEDICARE

## 2019-05-13 ENCOUNTER — HOSPITAL ENCOUNTER (EMERGENCY)
Age: 64
Discharge: HOME | End: 2019-05-13
Attending: EMERGENCY MEDICINE
Payer: MEDICARE

## 2019-05-13 VITALS
RESPIRATION RATE: 18 BRPM | SYSTOLIC BLOOD PRESSURE: 150 MMHG | HEIGHT: 63 IN | DIASTOLIC BLOOD PRESSURE: 95 MMHG | HEART RATE: 72 BPM | OXYGEN SATURATION: 96 % | BODY MASS INDEX: 35.7 KG/M2 | WEIGHT: 201.5 LBS | TEMPERATURE: 97.9 F

## 2019-05-13 DIAGNOSIS — R20.2 PARESTHESIA: Primary | ICD-10-CM

## 2019-05-13 LAB
ALBUMIN SERPL-MCNC: 3.1 G/DL (ref 3.5–5)
ALBUMIN/GLOB SERPL: 0.7 {RATIO} (ref 1.1–2.2)
ALP SERPL-CCNC: 109 U/L (ref 45–117)
ALT SERPL-CCNC: 22 U/L (ref 12–78)
ANION GAP SERPL CALC-SCNC: 5 MMOL/L (ref 5–15)
AST SERPL-CCNC: 20 U/L (ref 15–37)
BASOPHILS # BLD: 0.1 K/UL (ref 0–0.1)
BASOPHILS NFR BLD: 1 % (ref 0–1)
BILIRUB SERPL-MCNC: 0.2 MG/DL (ref 0.2–1)
BUN SERPL-MCNC: 27 MG/DL (ref 6–20)
BUN/CREAT SERPL: 22 (ref 12–20)
CALCIUM SERPL-MCNC: 9.2 MG/DL (ref 8.5–10.1)
CHLORIDE SERPL-SCNC: 106 MMOL/L (ref 97–108)
CO2 SERPL-SCNC: 28 MMOL/L (ref 21–32)
CREAT SERPL-MCNC: 1.21 MG/DL (ref 0.55–1.02)
DIFFERENTIAL METHOD BLD: ABNORMAL
EOSINOPHIL # BLD: 0.1 K/UL (ref 0–0.4)
EOSINOPHIL NFR BLD: 1 % (ref 0–7)
ERYTHROCYTE [DISTWIDTH] IN BLOOD BY AUTOMATED COUNT: 13.1 % (ref 11.5–14.5)
GLOBULIN SER CALC-MCNC: 4.4 G/DL (ref 2–4)
GLUCOSE SERPL-MCNC: 124 MG/DL (ref 65–100)
HCT VFR BLD AUTO: 36.9 % (ref 35–47)
HGB BLD-MCNC: 12.3 G/DL (ref 11.5–16)
IMM GRANULOCYTES # BLD AUTO: 0.1 K/UL (ref 0–0.04)
IMM GRANULOCYTES NFR BLD AUTO: 1 % (ref 0–0.5)
LYMPHOCYTES # BLD: 3.5 K/UL (ref 0.8–3.5)
LYMPHOCYTES NFR BLD: 35 % (ref 12–49)
MCH RBC QN AUTO: 29.6 PG (ref 26–34)
MCHC RBC AUTO-ENTMCNC: 33.3 G/DL (ref 30–36.5)
MCV RBC AUTO: 88.7 FL (ref 80–99)
MONOCYTES # BLD: 0.6 K/UL (ref 0–1)
MONOCYTES NFR BLD: 6 % (ref 5–13)
NEUTS SEG # BLD: 5.7 K/UL (ref 1.8–8)
NEUTS SEG NFR BLD: 56 % (ref 32–75)
NRBC # BLD: 0 K/UL (ref 0–0.01)
NRBC BLD-RTO: 0 PER 100 WBC
PLATELET # BLD AUTO: 257 K/UL (ref 150–400)
PMV BLD AUTO: 9.4 FL (ref 8.9–12.9)
POTASSIUM SERPL-SCNC: 3.6 MMOL/L (ref 3.5–5.1)
PROT SERPL-MCNC: 7.5 G/DL (ref 6.4–8.2)
RBC # BLD AUTO: 4.16 M/UL (ref 3.8–5.2)
SODIUM SERPL-SCNC: 139 MMOL/L (ref 136–145)
WBC # BLD AUTO: 9.9 K/UL (ref 3.6–11)

## 2019-05-13 PROCEDURE — 70450 CT HEAD/BRAIN W/O DYE: CPT

## 2019-05-13 PROCEDURE — 80053 COMPREHEN METABOLIC PANEL: CPT

## 2019-05-13 PROCEDURE — 36415 COLL VENOUS BLD VENIPUNCTURE: CPT

## 2019-05-13 PROCEDURE — 85025 COMPLETE CBC W/AUTO DIFF WBC: CPT

## 2019-05-13 PROCEDURE — 99283 EMERGENCY DEPT VISIT LOW MDM: CPT

## 2019-05-13 NOTE — ED TRIAGE NOTES
Dr. Devonte Sandoval to triage to assess. Code S does not need to be initiated at this time per Dr. Devonte Sandoval.

## 2019-05-14 ENCOUNTER — TELEPHONE (OUTPATIENT)
Dept: NEUROLOGY | Age: 64
End: 2019-05-14

## 2019-05-14 DIAGNOSIS — G40.019 LOCALIZATION-RELATED EPILEPSY, INTRACTABLE (HCC): Primary | ICD-10-CM

## 2019-05-14 NOTE — TELEPHONE ENCOUNTER
Patient was seen in the hospital twice for bad vertigo and nausea. The second time she went in her face was swollen and felt numb. They told her to reach out to us and it may be something to do with her seizures. I told her Josafat Lisa didn't have anything until next month but I would send you a message about it.

## 2019-05-14 NOTE — ED NOTES
Patient in no distress, states numbness/ decreased sensation to right side of face. Face symmetrical, cranial nerves intact, no signs of weakness in extremities. NIH negative. Patient speaking full sentences, clearly. No signs of any deficits. Dr. Jeramy Newton at bedside.

## 2019-05-14 NOTE — ED PROVIDER NOTES
EMERGENCY DEPARTMENT HISTORY AND PHYSICAL EXAM      Date: 5/13/2019  Patient Name: Osmin Umaña    History of Presenting Illness     Chief Complaint   Patient presents with    Numbness     reports bilateral facial numbness x 1 hour PTA, states was seen at [facility] this morning for vertigo       History Provided By: Patient    HPI: Osmin Umaña, 59 y.o. female with PMHx significant for seizures, hypertension, presents to the ED with cc of bilateral facial numbness of mild intensity over the last 12 to 24 hours. Patient also reports that she was seen in the emergency department yesterday for intermittent vertigo and nausea and vomiting. Patient reports suffering from vomiting and vertigo in the past many times but it is been almost a year since her prior symptoms. She denies any visual changes, focal arm or leg weakness, focal arm or leg numbness, ataxia, or any other neurologic complaints. She has had no recent infections and no recent change in medications. She has no exacerbating or ameliorating factors. No other associated symptoms. There are no other complaints, changes, or physical findings at this time. PCP: Dimple Chery MD    No current facility-administered medications on file prior to encounter. Current Outpatient Medications on File Prior to Encounter   Medication Sig Dispense Refill    ondansetron (ZOFRAN ODT) 4 mg disintegrating tablet Take 1 Tab by mouth every eight (8) hours as needed for Nausea. 10 Tab 0    meclizine (ANTIVERT) 25 mg tablet Take 1 Tab by mouth three (3) times daily as needed for Dizziness. 20 Tab 0    irbesartan (AVAPRO) 75 mg tablet Take 75 mg by mouth nightly.  levETIRAcetam 1,000 mg tablet TAKE 3 AND 1/2 TABLETS BY MOUTH TWICE A DAY 4536 Tab 0    lacosamide (VIMPAT) 150 mg tab tablet TAKE 1 TABLET BY MOUTH TWICE DAILY 60 Tab 3    ascorbate calcium (VITAMIN C PO) Take  by mouth daily.       Hydrochlorothiazide (HYDRODIURIL) 12.5 mg tablet 1    acetaminophen (TYLENOL) 325 mg tablet Take  by mouth every four (4) hours as needed for Pain.  amLODIPine (NORVASC) 5 mg tablet Take 5 mg by mouth daily.  montelukast (SINGULAIR) 10 mg tablet daily.  albuterol (PROVENTIL HFA, VENTOLIN HFA, PROAIR HFA) 90 mcg/actuation inhaler Take 1-2 puffs by inhalation every four (4) hours as needed for Wheezing.  calcium carbonate (TUMS) 200 mg calcium (500 mg) chew Take 1 Tab by mouth two (2) times a day.  multivitamin (ONE A DAY) tablet Take 1 Tab by mouth daily.  omega-3 fatty acids-vitamin e (FISH OIL) 1,000 mg cap Take 1 Cap by mouth. 3 caps daily      ERGOCALCIFEROL, VITAMIN D2, (VITAMIN D PO) Take 1,000 Units by mouth daily.          Past History     Past Medical History:  Past Medical History:   Diagnosis Date    Arthritis     Asthma     [facility] ([facility] [address]     [facility] cancer [facility])     February 2016    Fibromyalgia     GERD (gastroesophageal reflux disease)     Hypertension     Osteoporosis     Seizures ([facility] [address]     LAST SEIZURE 2013    Ulcer        Past Surgical History:  Past Surgical History:   Procedure Laterality Date    COLONOSCOPY N/A 4/16/2019    COL[facility] by Emerita Anaya [facility]COPY    HX BREAST BIOPSY Right     negative surgical biopsy    HX COLONOSCOPY  02/2016    HX HYSTERECTOMY      NEUROLOGICAL PROCEDURE UNLISTED  2009    brain surgery for seizures       Family History:  Family History   Problem Relation Age of Onset    Dementia Mother     Cancer Mother         colon    Hypertension Mother     Dementia Father     Diabetes Father     Hypertension Father     Stroke Father     Crohn's Disease Father     Suicide Sister     Breast Cancer Sister         42's    Cancer Sister 46        breast       Social History:  Social History     Tobacco Use    Smoking status: Former Smoker     Packs/day: 0.50     Years: 35.00     Pack years: 17.50     Last attempt to quit: 2/18/2019     Years since quittin.2    Smokeless tobacco: Never Used   Substance Use Topics    Alcohol use: No    Drug use: No       Allergies: Allergies   Allergen Reactions    Penicillins Itching         Review of Systems   Review of Systems   Constitutional: Negative for chills, diaphoresis, fatigue and fever. HENT: Negative for ear pain and sore throat. Eyes: Negative for pain and redness. Respiratory: Negative for cough and shortness of breath. Cardiovascular: Negative for chest pain and leg swelling. Gastrointestinal: Negative for abdominal pain, diarrhea, nausea and vomiting. Endocrine: Negative for cold intolerance and heat intolerance. Genitourinary: Negative for flank pain and hematuria. Musculoskeletal: Negative for back pain and neck stiffness. Skin: Negative for rash and wound. Neurological: Positive for numbness. Negative for dizziness, syncope and headaches. All other systems reviewed and are negative. Physical Exam   Physical Exam   Constitutional: She is oriented to person, place, and time. She appears well-developed and well-nourished. HENT:   Head: Normocephalic and atraumatic. Mouth/Throat: Oropharynx is clear and moist. No oropharyngeal exudate. Eyes: Pupils are equal, round, and reactive to light. Conjunctivae and EOM are normal.   Neck: Normal range of motion. Cardiovascular: Normal rate and regular rhythm. No murmur heard. Pulmonary/Chest: Effort normal and breath sounds normal. No respiratory distress. She has no wheezes. Abdominal: Soft. Bowel sounds are normal. She exhibits no distension. There is no tenderness. Musculoskeletal: Normal range of motion. She exhibits no edema or deformity. Neurological: She is alert and oriented to person, place, and time. She has normal strength and normal reflexes. No cranial nerve deficit or sensory deficit. She displays a negative Romberg sign. Coordination and gait normal. GCS eye subscore is 4.  GCS verbal subscore is 5. GCS motor subscore is 6. Skin: Skin is warm and dry. No rash noted. Psychiatric: She has a normal mood and affect. Her behavior is normal.   Nursing note and vitals reviewed. Diagnostic Study Results     Labs -   No results found for this or any previous visit (from the past 24 hour(s)). Radiologic Studies -   CT HEAD WO CONT   Final Result   IMPRESSION: No acute finding or change. Remote left craniotomy. CT Results  (Last 48 hours)    None        CXR Results  (Last 48 hours)    None            Medical Decision Making   I am the first provider for this patient. I reviewed the vital signs, available nursing notes, past medical history, past surgical history, family history and social history. Vital Signs-Reviewed the patient's vital signs. No data found. Pulse Oximetry Analysis -96 % on room air    Cardiac Monitor:   Rate: 80 bpm  Rhythm: Normal Sinus Rhythm        Records Reviewed: Nursing Notes and Old Medical Records    Differential Diagnosis:    Stroke versus electrolyte abnormality versus anxiety versus intracerebral hemorrhage    Provider Notes (Medical Decision Making):   Patient with normal CT of the head and blood work. My neurologic exam is entirely normal.  I do not feel patient has findings suggestive of central cause to her vertigo. Additionally I do not feel she is having a stroke. Paresthesias are bilateral and resulted no loss of any motor or sensory function. Patient to follow-up with a primary care doctor for further evaluation    ED Course:     Initial assessment performed. The patients presenting problems have been discussed, and they are in agreement with the care plan formulated and outlined with them. I have encouraged them to ask questions as they arise throughout their visit. Critical Care Time:     None    Disposition:  3:49 PM  Idalia Johnson's  results have been reviewed with her. She has been counseled regarding her diagnosis.   She verbally conveys understanding and agreement of the signs, symptoms, diagnosis, treatment and prognosis and additionally agrees to follow up as recommended with Dr. Ewelina Mack MD in 24 - 48 hours. She also agrees with the care-plan and conveys that all of her questions have been answered. I have also put together some discharge instructions for her that include: 1) educational information regarding their diagnosis, 2) how to care for their diagnosis at home, as well a 3) list of reasons why they would want to return to the ED prior to their follow-up appointment, should their condition change. PLAN:  1. Discharge Medication List as of 5/13/2019  9:59 PM        2. Follow-up Information     Follow up With Specialties Details Why Contact Info    Ewelina Mack MD [facility] In 3 days  [phone] [facility]  [phone]          Return to ED if worse     Diagnosis     Clinical Impression:   1.  Paresthesia

## 2019-05-14 NOTE — DISCHARGE INSTRUCTIONS
Patient Education        Numbness and Tingling: Care Instructions  Your Care Instructions    Many things can cause numbness or tingling. Swelling may put pressure on a nerve. This could cause you to lose feeling or have a pins-and-needles sensation on part of your body. Nerves may be damaged from trauma, toxins, or diseases, such as diabetes or multiple sclerosis (MS). Sometimes, though, the cause is not clear. If there is no clear reason for your symptoms, and you are not having any other symptoms, your doctor may suggest watching and waiting for a while to see if the numbness or tingling goes away on its own. Your doctor may want you to have blood or nerve tests to find the cause of your symptoms. Follow-up care is a key part of your treatment and safety. Be sure to make and go to all appointments, and call your doctor if you are having problems. It's also a good idea to know your test results and keep a list of the medicines you take. How can you care for yourself at home? · If your doctor prescribes medicine, take it exactly as directed. Call your doctor if you think you are having a problem with your medicine. · If you have any swelling, put ice or a cold pack on the area for 10 to 20 minutes at a time. Put a thin cloth between the ice and your skin. When should you call for help? Call 911 anytime you think you may need emergency care. For example, call if:    · You have weakness, numbness, or tingling in both legs.     · You lose bowel or bladder control.     · You have symptoms of a stroke. These may include:  ? Sudden numbness, tingling, weakness, or loss of movement in your face, arm, or leg, especially on only one side of your body. ? Sudden vision changes. ? Sudden trouble speaking. ? Sudden confusion or trouble understanding simple statements. ? Sudden problems with walking or balance.   ? A sudden, severe headache that is different from past headaches.    Watch closely for changes in your health, and be sure to contact your doctor if you have any problems, or if:    · You do not get better as expected. Where can you learn more? Go to http://ronny-janey.info/. Enter T725 in the search box to learn more about \"Numbness and Tingling: Care Instructions. \"  Current as of: Shannon 3, 2018  Content Version: 11.9  © 8637-7847 [employer]. Care instructions adapted under license by [employer] (which disclaims liability or warranty for this information). If you have questions about a medical condition or this instruction, always ask your healthcare professional. [address] any warranty or liability for your use of this information.

## 2019-05-16 NOTE — TELEPHONE ENCOUNTER
Patient had a really bout of vertigo so she went to the ER on Sunday. She went again the next day or several days after that because she was having numbness in her face. She had scans done and they told her nothing was wrong and to call us and let us know what was going on. The numbness went away yesterday. She was told it might have had someothing to do with her seizure medication? She was asking if you needed to see her or if you had any idea of what she needed to do? Please advise     She's been on her AEDs for a while now so I would not think that she is getting side effect at this point. Let's see if she is toxic and send her for drug levels. Follow up after especially if symptoms are persisting. Per NP. Contacted the patient to let her know of the previous message sent from the NP. Will put her lab requisition in the mail for her today. She will get it done and then we will see what the results are if she is still having issues we will bring her in for follow up.

## 2019-06-02 LAB
LACOSAMIDE SERPL-MCNC: 16.9 UG/ML (ref 5–10)
LEVETIRACETAM SERPL-MCNC: 141.7 UG/ML (ref 10–40)

## 2019-08-01 DIAGNOSIS — G40.019 LOCALIZATION-RELATED EPILEPSY, INTRACTABLE (HCC): ICD-10-CM

## 2019-08-01 RX ORDER — LACOSAMIDE 150 MG/1
TABLET ORAL
Qty: 60 TAB | Refills: 0 | Status: SHIPPED | OUTPATIENT
Start: 2019-08-01 | End: 2019-09-03 | Stop reason: SDUPTHER

## 2019-08-05 ENCOUNTER — APPOINTMENT (OUTPATIENT)
Dept: INFUSION THERAPY | Age: 64
End: 2019-08-05

## 2019-09-03 DIAGNOSIS — G40.019 LOCALIZATION-RELATED EPILEPSY, INTRACTABLE (HCC): ICD-10-CM

## 2019-09-03 NOTE — TELEPHONE ENCOUNTER
----- Message from Ray Murray sent at 9/3/2019 12:01 PM EDT -----  Regarding: Dr. Elaine Paredes (if not patient):      Relationship of caller (if not patient):      Best contact number(s): [phone]      Name of medication and dosage if known: Vimpat      Is patient out of this medication (yes/no):      Pharmacy name: [facility] listed in chart? (yes/no): yes  Pharmacy phone number:       Details to clarify the request:       Ray Murray

## 2019-09-04 RX ORDER — LACOSAMIDE 150 MG/1
TABLET ORAL
Qty: 60 TAB | Refills: 3 | Status: SHIPPED | OUTPATIENT
Start: 2019-09-04 | End: 2019-09-18 | Stop reason: SDUPTHER

## 2019-09-18 ENCOUNTER — OFFICE VISIT (OUTPATIENT)
Dept: NEUROLOGY | Age: 64
End: 2019-09-18

## 2019-09-18 VITALS
BODY MASS INDEX: 35.69 KG/M2 | RESPIRATION RATE: 20 BRPM | SYSTOLIC BLOOD PRESSURE: 138 MMHG | HEIGHT: 63 IN | OXYGEN SATURATION: 98 % | DIASTOLIC BLOOD PRESSURE: 90 MMHG | HEART RATE: 75 BPM

## 2019-09-18 DIAGNOSIS — G40.019 LOCALIZATION-RELATED EPILEPSY, INTRACTABLE (HCC): ICD-10-CM

## 2019-09-18 PROBLEM — E66.01 SEVERE OBESITY (HCC): Status: ACTIVE | Noted: 2019-09-18

## 2019-09-18 RX ORDER — LEVETIRACETAM 1000 MG/1
3000 TABLET ORAL 2 TIMES DAILY
Qty: 540 TAB | Refills: 3 | Status: SHIPPED | OUTPATIENT
Start: 2019-09-18 | End: 2020-04-16

## 2019-09-18 RX ORDER — LACOSAMIDE 150 MG/1
TABLET ORAL
Qty: 180 TAB | Refills: 3 | Status: SHIPPED | OUTPATIENT
Start: 2019-09-18 | End: 2019-12-05 | Stop reason: SDUPTHER

## 2019-09-18 NOTE — PROGRESS NOTES
Date:  19     Name:  Lissette Lozano  :  1955  MRN:  [id number]     PCP:  Nilda Wilde MD    Chief Complaint   Patient presents with    Epilepsy     HISTORY OF PRESENT ILLNESS: Patient presents today for yearly follow up of epilepsy. She continues to take Keppra 3000mg twice a day. She denies having any seizures or aura. She denies having any side effect or toxicity. Ongoing aches and pains in her joints related to arthritis. No other complaints. No new diagnosis, surgeries, or medications. Since her last office visit, her mother did pass away which has created more stress for her particularly as it relates to dealing with her siblings. Except as noted above, denies  fever, chills, cough. No CP or SOB. No dysuria, loss of bowel or bladder control. No Weight loss. Appetite good. Sleeping well. No sweats. No edema. No bruising or bleeding. No nausea or vomit. No diarrhea. No frequency, urgency, No depressive sxs. No anxiety. Denies sore throat, nasal congestion, nasal discharge, epistaxis, tinnitus, hearing loss, back pain, muscle pain, or joint pain. Current Outpatient Medications   Medication Sig    lacosamide (VIMPAT) 150 mg tab tablet TAKE 1 TABLET BY MOUTH TWICE DAILY    ondansetron (ZOFRAN ODT) 4 mg disintegrating tablet Take 1 Tab by mouth every eight (8) hours as needed for Nausea.  meclizine (ANTIVERT) 25 mg tablet Take 1 Tab by mouth three (3) times daily as needed for Dizziness.  irbesartan (AVAPRO) 75 mg tablet Take 75 mg by mouth nightly.  levETIRAcetam 1,000 mg tablet TAKE 3 AND 1/2 TABLETS BY MOUTH TWICE A DAY    ascorbate calcium (VITAMIN C PO) Take  by mouth daily.  Hydrochlorothiazide (HYDRODIURIL) 12.5 mg tablet     acetaminophen (TYLENOL) 325 mg tablet Take  by mouth every four (4) hours as needed for Pain.  amLODIPine (NORVASC) 5 mg tablet Take 5 mg by mouth daily.  montelukast (SINGULAIR) 10 mg tablet daily.     albuterol (PROVENTIL HFA, VENTOLIN HFA, PROAIR HFA) 90 mcg/actuation inhaler Take 1-2 puffs by inhalation every four (4) hours as needed for Wheezing.  calcium carbonate (TUMS) 200 mg calcium (500 mg) chew Take 1 Tab by mouth two (2) times a day.  multivitamin (ONE A DAY) tablet Take 1 Tab by mouth daily.  omega-3 fatty acids-vitamin e (FISH OIL) 1,000 mg cap Take 1 Cap by mouth. 3 caps daily    ERGOCALCIFEROL, VITAMIN D2, (VITAMIN D PO) Take 1,000 Units by mouth daily. No current facility-administered medications for this visit.       Allergies   Allergen Reactions    Penicillins Itching     Past Medical History:   Diagnosis Date    Arthritis     Asthma     [facility] [address]     [facility] cancer [facility])     2016    Fibromyalgia     GERD (gastroesophageal reflux disease)     Hypertension     Osteoporosis     Seizures ([facility] [address]     LAST SEIZURE     Ulcer      Past Surgical History:   Procedure Laterality Date    COLONOSCOPY N/A 2019    [facility]    HX BREAST BIOPSY Right     negative surgical biopsy    HX COLONOSCOPY  2016    HX HYSTERECTOMY      NEUROLOGICAL PROCEDURE UNLISTED      brain surgery for seizures     Social History     Socioeconomic History    Marital status:      Spouse name: Not on file    Number of children: Not on file    Years of education: Not on file    Highest education level: Not on file   Occupational History    Not on file   Social Needs    Financial resource strain: Not on file    Food insecurity:     Worry: Not on file     Inability: Not on file    Transportation needs:     Medical: Not on file     Non-medical: Not on file   Tobacco Use    Smoking status: Former Smoker     Packs/day: 0.50     Years: 35.00     Pack years: 17.50     Last attempt to quit: 2019     Years since quittin.5    Smokeless tobacco: Never Used   Substance and Sexual Activity    Alcohol use: No    Drug use: No    Sexual activity: Not on file   Lifestyle    Physical activity:     Days per week: Not on file     Minutes per session: Not on file    Stress: Not on file   Relationships    Social connections:     Talks on phone: Not on file     Gets together: Not on file     Attends Alevism service: Not on file     Active member of club or organization: Not on file     Attends meetings of clubs or organizations: Not on file     Relationship status: Not on file    Intimate partner violence:     Fear of current or ex partner: Not on file     Emotionally abused: Not on file     Physically abused: Not on file     Forced sexual activity: Not on file   Other Topics Concern    Not on file   Social History Narrative    Not on file     Family History   Problem Relation Age of Onset    Dementia Mother     Cancer Mother         colon    Hypertension Mother     Dementia Father     Diabetes Father     Hypertension Father     Stroke Father     Crohn's Disease Father     Suicide Sister     Breast Cancer Sister         42's    Cancer Sister 46        breast       PHYSICAL EXAMINATION:    Visit Vitals  /90   Pulse 75   Resp 20   Ht 5' 3\" (1.6 m)   SpO2 98%   BMI 35.69 kg/m²     General: Well defined, nourished, and groomed individual in no acute distress.    Neck: Supple, nontender, no bruits, no pain with resistance to active range of motion.    Heart: Regular rate and rhythm, no murmurs, rub, or gallop. Normal S1S2. Lungs: Clear to auscultation bilaterally with equal chest expansion, no cough, no wheeze  Musculoskeletal: Extremities revealed no edema and had full range of motion of joints.    Psych: Good mood and bright affect      NEUROLOGICAL EXAMINATION:    Mental Status: Alert and oriented to person, place, and time       Cranial Nerves:    II, III, IV, VI: Visual acuity grossly intact.  Visual fields are normal.    Pupils are equal, round, and reactive to light and accommodation.    Extra-ocular movements are full and fluid. Fundoscopic exam was benign, no ptosis or nystagmus.    V-XII: Hearing is grossly intact. Facial features are symmetric, with normal sensation and strength. The palate rises symmetrically and the tongue protrudes midline. Sternocleidomastoids 5/5.       Motor Examination: Normal tone, bulk, and strength, 5/5 muscle strength throughout.    Coordination: Finger to nose was normal. No resting or intention tremor  Gait and Station: Steady while walking. Normal arm swing. No pronator drift. No muscle wasting or fasiculations noted.    Reflexes: DTRs 2+ throughout    ASSESSMENT AND PLAN    ICD-10-CM ICD-9-CM    1. Localization-related epilepsy, intractable (HCC) G40.119 345.51 levETIRAcetam 1,000 mg tablet      lacosamide (VIMPAT) 150 mg tab tablet     Doing well on present therapy of Keppra 3000mg twice a day and Vimpat 150mg twice a day without side effect or sign of toxicity. No seizures or aura. Continue with medications as prescribed. Follow up in one year or sooner if needed. Melyssa Toledo So

## 2019-12-04 DIAGNOSIS — G40.019 LOCALIZATION-RELATED EPILEPSY, INTRACTABLE (HCC): ICD-10-CM

## 2019-12-04 NOTE — TELEPHONE ENCOUNTER
----- Message from Brooksie Hodgkin sent at 12/4/2019 11:08 AM EST -----  Regarding: NP Wesley/Refill  Medication Refill    Caller (if not patient):Pt      Relationship of caller (if not patient):Pt      Best contact number(s):[phone]      Name of medication and dosage if known: Vimpat 150mg      Is patient out of this medication (yes/no): No      Pharmacy name:[address])    Pharmacy listed in chart? (yes/no): No  Pharmacy phone NTXMTF:[phone]      Details to clarify the request:      Brooksie Hodgkin

## 2019-12-05 RX ORDER — LACOSAMIDE 150 MG/1
TABLET ORAL
Qty: 180 TAB | Refills: 3 | Status: SHIPPED | OUTPATIENT
Start: 2019-12-05 | End: 2020-04-08

## 2020-04-08 DIAGNOSIS — G40.019 LOCALIZATION-RELATED EPILEPSY, INTRACTABLE (HCC): ICD-10-CM

## 2020-04-08 RX ORDER — LACOSAMIDE 150 MG/1
TABLET ORAL
Qty: 60 TAB | Refills: 3 | Status: SHIPPED | OUTPATIENT
Start: 2020-04-08 | End: 2020-08-10

## 2020-04-16 DIAGNOSIS — G40.019 LOCALIZATION-RELATED EPILEPSY, INTRACTABLE (HCC): ICD-10-CM

## 2020-04-16 RX ORDER — LEVETIRACETAM 1000 MG/1
TABLET ORAL
Qty: 630 TAB | Refills: 7 | Status: SHIPPED | OUTPATIENT
Start: 2020-04-16 | End: 2020-09-16 | Stop reason: SDUPTHER

## 2020-07-10 ENCOUNTER — HOSPITAL ENCOUNTER (OUTPATIENT)
Dept: MAMMOGRAPHY | Age: 65
Discharge: HOME | End: 2020-07-10
Attending: FAMILY MEDICINE
Payer: MEDICARE

## 2020-07-10 DIAGNOSIS — Z12.31 VISIT FOR SCREENING MAMMOGRAM: ICD-10-CM

## 2020-07-10 PROCEDURE — 77067 SCR MAMMO BI INCL CAD: CPT

## 2020-08-08 DIAGNOSIS — G40.019 LOCALIZATION-RELATED EPILEPSY, INTRACTABLE (HCC): ICD-10-CM

## 2020-08-10 RX ORDER — LACOSAMIDE 150 MG/1
TABLET ORAL
Qty: 60 TAB | Refills: 3 | Status: SHIPPED | OUTPATIENT
Start: 2020-08-10 | End: 2020-09-16 | Stop reason: SDUPTHER

## 2020-08-11 ENCOUNTER — TELEPHONE (OUTPATIENT)
Dept: NEUROLOGY | Age: 65
End: 2020-08-11

## 2020-09-16 ENCOUNTER — OFFICE VISIT (OUTPATIENT)
Dept: NEUROLOGY | Age: 65
End: 2020-09-16
Payer: MEDICARE

## 2020-09-16 VITALS
HEIGHT: 63 IN | WEIGHT: 207 LBS | DIASTOLIC BLOOD PRESSURE: 78 MMHG | RESPIRATION RATE: 17 BRPM | SYSTOLIC BLOOD PRESSURE: 122 MMHG | OXYGEN SATURATION: 98 % | HEART RATE: 92 BPM | TEMPERATURE: 96.9 F | BODY MASS INDEX: 36.68 KG/M2

## 2020-09-16 DIAGNOSIS — G40.019 LOCALIZATION-RELATED EPILEPSY, INTRACTABLE (HCC): ICD-10-CM

## 2020-09-16 PROCEDURE — 99214 OFFICE O/P EST MOD 30 MIN: CPT | Performed by: NURSE PRACTITIONER

## 2020-09-16 PROCEDURE — G9711 PT HX TOT COL OR COLON CA: HCPCS | Performed by: NURSE PRACTITIONER

## 2020-09-16 PROCEDURE — G8432 DEP SCR NOT DOC, RNG: HCPCS | Performed by: NURSE PRACTITIONER

## 2020-09-16 PROCEDURE — G8419 CALC BMI OUT NRM PARAM NOF/U: HCPCS | Performed by: NURSE PRACTITIONER

## 2020-09-16 PROCEDURE — G9899 SCRN MAM PERF RSLTS DOC: HCPCS | Performed by: NURSE PRACTITIONER

## 2020-09-16 PROCEDURE — G8427 DOCREV CUR MEDS BY ELIG CLIN: HCPCS | Performed by: NURSE PRACTITIONER

## 2020-09-16 RX ORDER — IBUPROFEN 200 MG
CAPSULE ORAL
COMMUNITY

## 2020-09-16 RX ORDER — LORATADINE AND PSEUDOEPHEDRINE SULFATE 10; 240 MG/1; MG/1
1 TABLET, EXTENDED RELEASE ORAL DAILY
COMMUNITY

## 2020-09-16 RX ORDER — LACOSAMIDE 150 MG/1
TABLET ORAL
Qty: 180 TAB | Refills: 3 | Status: SHIPPED | OUTPATIENT
Start: 2020-09-16 | End: 2021-04-06

## 2020-09-16 RX ORDER — LEVETIRACETAM 1000 MG/1
TABLET ORAL
Qty: 540 TAB | Refills: 3 | Status: SHIPPED | OUTPATIENT
Start: 2020-09-16 | End: 2021-08-17

## 2020-09-16 NOTE — PROGRESS NOTES
Date:  20     Name:  Tasia Zaragoza  :  1955  MRN:  [id number]     PCP:  Nacho Campbell MD    Chief Complaint   Patient presents with    Epilepsy     HISTORY OF PRESENT ILLNESS: Patient presents today for yearly follow up of epilepsy. She continues to take Keppra 3000mg twice a day and Vimpat 150mg twice a day. She denies having any seizures or aura. She denies having any side effect or toxicity. She did run out of the Vimpat for about four days and felt a little off but no seizures. Ongoing aches and pains in her joints related to arthritis. No other complaints. No new diagnosis, surgeries, or medications. Except as noted above, denies  fever, chills, cough. No CP or SOB. No dysuria, loss of bowel or bladder control. No Weight loss. Appetite good. Sleeping well. No sweats. No edema. No bruising or bleeding. No nausea or vomit. No diarrhea. No frequency, urgency, No depressive sxs. No anxiety. Denies sore throat, nasal congestion, nasal discharge, epistaxis, tinnitus, hearing loss, back pain, muscle pain, or joint pain. Current Outpatient Medications   Medication Sig    ibuprofen 200 mg cap Take  by mouth.  loratadine-pseudoephedrine (Claritin-D 24 Hour)  mg per tablet Take 1 Tab by mouth daily.  lacosamide (Vimpat) 150 mg tab tablet TAKE 1 TABLET BY MOUTH TWICE A DAY    levETIRAcetam 1,000 mg tablet TAKE 3 AND 1/2 TABLETS BY MOUTH TWICE A DAY    Hydrochlorothiazide (HYDRODIURIL) 12.5 mg tablet     amLODIPine (NORVASC) 5 mg tablet Take 5 mg by mouth daily.  calcium carbonate (TUMS) 200 mg calcium (500 mg) chew Take 1 Tab by mouth two (2) times a day.  multivitamin (ONE A DAY) tablet Take 1 Tab by mouth daily.  ERGOCALCIFEROL, VITAMIN D2, (VITAMIN D PO) Take 1,000 Units by mouth daily. No current facility-administered medications for this visit.       Allergies   Allergen Reactions    Penicillins Itching     Past Medical History: Diagnosis Date    Arthritis     Asthma     [facility]erv[facility] [address]     [facility] cancer [facility])     2016    Fibromyalgia     GERD (gastroesophageal reflux disease)     Hypertension     Osteoporosis     Seizures ([facility] [address]     LAST SEIZURE 2013    Ulcer      Past Surgical History:   Procedure Laterality Date    COLONOSCOPY N/A 2019    [facility]    HX BREAST BIOPSY Right     negative surgical biopsy    HX COLONOSCOPY  2016    HX HYSTERECTOMY      NEUROLOGICAL PROCEDURE UNLISTED      brain surgery for seizures     Social History     Socioeconomic History    Marital status:      Spouse name: Not on file    Number of children: Not on file    Years of education: Not on file    Highest education level: Not on file   Occupational History    Not on file   Social Needs    Financial resource strain: Not on file    Food insecurity     Worry: Not on file     Inability: Not on file    Transportation needs     Medical: Not on file     Non-medical: Not on file   Tobacco Use    Smoking status: Former Smoker     Packs/day: 0.50     Years: 35.00     Pack years: 17.50     Last attempt to quit: 2019     Years since quittin.5    Smokeless tobacco: Never Used   Substance and Sexual Activity    Alcohol use: No    Drug use: No    Sexual activity: Not on file   Lifestyle    Physical activity     Days per week: Not on file     Minutes per session: Not on file    Stress: Not on file   Relationships    Social connections     Talks on phone: Not on file     Gets together: Not on file     Attends Taoism service: Not on file     Active member of club or organization: Not on file     Attends meetings of clubs or organizations: Not on file     Relationship status: Not on file    Intimate partner violence     Fear of current or ex partner: Not on file     Emotionally abused: Not on file     Physically abused: Not on file     Forced sexual activity: Not on file   Other Topics Concern    Not on file   Social History Narrative    Not on file     Family History   Problem Relation Age of Onset    Dementia Mother     Cancer Mother         colon    Hypertension Mother     Dementia Father     Diabetes Father     Hypertension Father     Stroke Father     Crohn's Disease Father     Suicide Sister     Breast Cancer Sister         42's    Cancer Sister 46        breast       PHYSICAL EXAMINATION:    Visit Vitals  /78   Pulse 92   Temp 96.9 °F (36.1 °C)   Resp 17   Ht 5' 3\" (1.6 m)   Wt 93.9 kg (207 lb)   SpO2 98%   BMI 36.67 kg/m²     General: Well defined, nourished, and groomed individual in no acute distress.    Neck: Supple, nontender, no bruits, no pain with resistance to active range of motion.    Heart: Regular rate and rhythm, no murmurs, rub, or gallop. Normal S1S2. Lungs: Clear to auscultation bilaterally with equal chest expansion, no cough, no wheeze  Musculoskeletal: Extremities revealed no edema and had full range of motion of joints.    Psych: Good mood and bright affect      NEUROLOGICAL EXAMINATION:    Mental Status: Alert and oriented to person, place, and time       Cranial Nerves:    II, III, IV, VI: Visual acuity grossly intact. Visual fields are normal.    Pupils are equal, round, and reactive to light and accommodation.    Extra-ocular movements are full and fluid. Fundoscopic exam was benign, no ptosis or nystagmus.    V-XII: Hearing is grossly intact. Facial features are symmetric, with normal sensation and strength. The palate rises symmetrically and the tongue protrudes midline. Sternocleidomastoids 5/5.       Motor Examination: Normal tone, bulk, and strength, 5/5 muscle strength throughout.    Coordination: Finger to nose was normal. No resting or intention tremor  Gait and Station: Steady while walking. Normal arm swing. No pronator drift.  No muscle wasting or fasiculations noted.    Reflexes: DTRs 2+ throughout    [address] [place] [address],  [address] ICD-9-CM    1. Localization-related epilepsy, intractable (HCC)  G40.019 345.51 lacosamide (Vimpat) 150 mg tab tablet      levETIRAcetam 1,000 mg tablet     Doing well on present therapy of Keppra 3000mg twice a day and Vimpat 150mg twice a day without side effect or sign of toxicity. No seizures or aura. Continue with medications as prescribed. Follow up in six months or sooner if needed. Melyssa Bae

## 2021-03-16 ENCOUNTER — OFFICE VISIT (OUTPATIENT)
Dept: NEUROLOGY | Age: 66
End: 2021-03-16
Payer: MEDICARE

## 2021-03-16 VITALS
WEIGHT: 215 LBS | BODY MASS INDEX: 38.09 KG/M2 | RESPIRATION RATE: 17 BRPM | HEART RATE: 82 BPM | HEIGHT: 63 IN | SYSTOLIC BLOOD PRESSURE: 124 MMHG | DIASTOLIC BLOOD PRESSURE: 76 MMHG | OXYGEN SATURATION: 97 %

## 2021-03-16 DIAGNOSIS — J44.9 COPD WITH ASTHMA (HCC): ICD-10-CM

## 2021-03-16 DIAGNOSIS — G40.019 LOCALIZATION-RELATED EPILEPSY, INTRACTABLE (HCC): Primary | ICD-10-CM

## 2021-03-16 PROCEDURE — 99214 OFFICE O/P EST MOD 30 MIN: CPT | Performed by: NURSE PRACTITIONER

## 2021-03-16 PROCEDURE — G9899 SCRN MAM PERF RSLTS DOC: HCPCS | Performed by: NURSE PRACTITIONER

## 2021-03-16 PROCEDURE — G8432 DEP SCR NOT DOC, RNG: HCPCS | Performed by: NURSE PRACTITIONER

## 2021-03-16 PROCEDURE — G8417 CALC BMI ABV UP PARAM F/U: HCPCS | Performed by: NURSE PRACTITIONER

## 2021-03-16 PROCEDURE — G8427 DOCREV CUR MEDS BY ELIG CLIN: HCPCS | Performed by: NURSE PRACTITIONER

## 2021-03-16 PROCEDURE — G9711 PT HX TOT COL OR COLON CA: HCPCS | Performed by: NURSE PRACTITIONER

## 2021-03-16 RX ORDER — ALBUTEROL SULFATE 90 UG/1
2 AEROSOL, METERED RESPIRATORY (INHALATION)
Qty: 1 INHALER | Refills: 2 | Status: SHIPPED | OUTPATIENT
Start: 2021-03-16 | End: 2021-09-16 | Stop reason: SDUPTHER

## 2021-03-16 NOTE — PROGRESS NOTES
Date:  21     Name:  Garret Orellana  :  1955  MRN:  [id number]     PCP:  Ayanna Pena MD    Chief Complaint   Patient presents with    Seizure     HISTORY OF PRESENT ILLNESS: Patient presents today for follow up of epilepsy. She continues to take Keppra 3000mg twice a day and Vimpat 150mg twice a day. She denies having any seizures or aura. she only has aura if she takes her medication late. Recently started on Asmanex for asthma/COPD management. She indicates that she used to have an albuterol inhaler but they did not give her one of these. No new diagnosis, surgeries, or medications. Recap from [facility]:  Doing well on present therapy of Keppra 3000mg twice a day and Vimpat 150mg twice a day without side effect or sign of toxicity. No seizures or aura. Continue with medications as prescribed. Follow up in six months or sooner if needed. Current Outpatient Medications   Medication Sig    mometasone (Asmanex Twisthaler) 110 mcg/ actuation (7) aepb Take 2 Puffs by inhalation daily. Indications: controller medication for asthma    ibuprofen 200 mg cap Take  by mouth.  loratadine-pseudoephedrine (Claritin-D 24 Hour)  mg per tablet Take 1 Tab by mouth daily.  lacosamide (Vimpat) 150 mg tab tablet TAKE 1 TABLET BY MOUTH TWICE A DAY    levETIRAcetam 1,000 mg tablet TAKE 3 TABLETS BY MOUTH TWICE A DAY    Hydrochlorothiazide (HYDRODIURIL) 12.5 mg tablet     amLODIPine (NORVASC) 5 mg tablet Take 5 mg by mouth daily.  calcium carbonate (TUMS) 200 mg calcium (500 mg) chew Take 1 Tab by mouth two (2) times a day.  multivitamin (ONE A DAY) tablet Take 1 Tab by mouth daily.  ERGOCALCIFEROL, VITAMIN D2, (VITAMIN D PO) Take 1,000 Units by mouth daily. No current facility-administered medications for this visit.       Allergies   Allergen Reactions    Penicillins Itching     Past Medical History:   Diagnosis Date    Arthritis     Asthma     Cervical cancer [facility])     Colon cancer [facility])     2016    Fibromyalgia     GERD (gastroesophageal reflux disease)     Hypertension     Osteoporosis     Seizures ([facility] [address]     LAST SEIZURE 2013    Ulcer      Past Surgical History:   Procedure Laterality Date    COLONOSCOPY N/A 2019    [facility]COPY    HX BREAST BIOPSY Right     negative surgical biopsy    HX COLONOSCOPY  2016    HX HYSTERECTOMY      NEUROLOGICAL PROCEDURE UNLISTED      brain surgery for seizures     Social History     Socioeconomic History    Marital status:      Spouse name: Not on file    Number of children: Not on file    Years of education: Not on file    Highest education level: Not on file   Occupational History    Not on file   Social Needs    Financial resource strain: Not on file    Food insecurity     Worry: Not on file     Inability: Not on file    Transportation needs     Medical: Not on file     Non-medical: Not on file   Tobacco Use    Smoking status: Former Smoker     Packs/day: 0.50     Years: 35.00     Pack years: 17.50     Quit date: 2019     Years since quittin.0    Smokeless tobacco: Never Used   Substance and Sexual Activity    Alcohol use: No    Drug use: No    Sexual activity: Not on file   Lifestyle    Physical activity     Days per week: Not on file     Minutes per session: Not on file    Stress: Not on file   Relationships    Social connections     Talks on phone: Not on file     Gets together: Not on file     Attends Shinto service: Not on file     Active member of club or organization: Not on file     Attends meetings of clubs or organizations: Not on file     Relationship status: Not on file    Intimate partner violence     Fear of current or ex partner: Not on file     Emotionally abused: Not on file     Physically abused: Not on file     Forced sexual activity: Not on file   Other Topics Concern    Not on file   Social History Narrative    Not on file     Family History   Problem Relation Age of Onset    Dementia Mother     Cancer Mother         colon    Hypertension Mother     Dementia Father     Diabetes Father     Hypertension Father     Stroke Father     Crohn's Disease Father     Suicide Sister     Breast Cancer Sister         42's    Cancer Sister 46        breast       PHYSICAL EXAMINATION:    Visit Vitals  /76   Pulse 82   Resp 17   Ht 5' 3\" (1.6 m)   Wt 97.5 kg (215 lb)   SpO2 97%   BMI 38.09 kg/m²     General: Well defined, nourished, and groomed individual in no acute distress.    Neck: Supple, nontender, no bruits, no pain with resistance to active range of motion.    Heart: Regular rate and rhythm, no murmurs, rub, or gallop. Normal S1S2. Lungs: Clear to auscultation bilaterally with equal chest expansion, no cough, no wheeze  Musculoskeletal: Extremities revealed no edema and had full range of motion of joints.    Psych: Good mood and bright affect      NEUROLOGICAL EXAMINATION:    Mental Status: Alert and oriented to person, place, and time       Cranial Nerves:    II, III, IV, VI: Visual acuity grossly intact. Visual fields are normal.    Pupils are equal, round, and reactive to light and accommodation.    Extra-ocular movements are full and fluid. Fundoscopic exam was benign, no ptosis or nystagmus.    V-XII: Hearing is grossly intact. Facial features are symmetric, with normal sensation and strength. The palate rises symmetrically and the tongue protrudes midline. Sternocleidomastoids 5/5.       Motor Examination: Normal tone, bulk, and strength, 5/5 muscle strength throughout.    Coordination: Finger to nose was normal. No resting or intention tremor  Gait and Station: Steady while walking. Normal arm swing. No pronator drift. No muscle wasting or fasiculations noted.    Reflexes: DTRs 2+ throughout    ASSESSMENT AND PLAN    ICD-10-CM ICD-9-CM    1.  Localization-related epilepsy, intractable ([facility] [address] G40.019 345.51    2. COPD with asthma ([facility] [address]  J44.9 493.20 albuterol (PROVENTIL HFA, VENTOLIN HFA, PROAIR HFA) 90 mcg/actuation inhaler     Epilepsy is stable on present therapy of Keppra 3000mg bid and Vimpat 150mg bid without signs or symptoms of side effect or toxicity. Mentioned today that she was being treated for COPD related asthma but has no rescue inhaler. Provided her with a Proventil HFA for treatment of acute asthma attack. Follow up in six months    Maciej Sargent

## 2021-04-06 DIAGNOSIS — G40.019 LOCALIZATION-RELATED EPILEPSY, INTRACTABLE (HCC): ICD-10-CM

## 2021-04-06 RX ORDER — LACOSAMIDE 150 MG/1
TABLET ORAL
Qty: 180 TAB | Refills: 3 | Status: SHIPPED | OUTPATIENT
Start: 2021-04-06 | End: 2021-09-16 | Stop reason: SDUPTHER

## 2021-05-27 ENCOUNTER — TELEPHONE (OUTPATIENT)
Dept: NEUROLOGY | Age: 66
End: 2021-05-27

## 2021-05-27 NOTE — TELEPHONE ENCOUNTER
----- Message from Wilner Shook sent at 5/27/2021 12:40 PM EDT -----  Regarding: KELECHI Olson/Telephone  Contact: [phone]  General Message/Vendor Calls    Caller's first and last name:Skyla Medel      Reason for call:Pt would like to know if there is a more affordable medication for the lacosamide 150mg 2x a day that could be prescribed. Pt is worried she will be unable to afford the medication. Callback required yes/no and why:Yes, discuss.        Best contact number(s):[phone]        Details to clarify the request:n/a      Wilner Shook

## 2021-06-02 NOTE — TELEPHONE ENCOUNTER
Vimpat is costing the patient 50.oo per month and she wanted to know if it could cost less. I let her know about the coupon code that she can get on line. But I also let her know that if she is using her SS benefits it may state that she is not eligible. I also let her know if she wanted a different medication that she would have to come in and see [facility]. She stated that she would stay on it, but had hoped that she could get the cost reduced. I let her know about the patient assistance through Vimpat online as that may have information for assistance available to her. Patient stated understanding.

## 2021-06-16 ENCOUNTER — TRANSCRIBE ORDER (OUTPATIENT)
Dept: SCHEDULING | Age: 66
End: 2021-06-16

## 2021-06-16 DIAGNOSIS — Z12.31 SCREENING MAMMOGRAM FOR HIGH-RISK PATIENT: Primary | ICD-10-CM

## 2021-07-12 ENCOUNTER — HOSPITAL ENCOUNTER (OUTPATIENT)
Dept: MAMMOGRAPHY | Age: 66
Discharge: HOME | End: 2021-07-12
Attending: FAMILY MEDICINE
Payer: MEDICARE

## 2021-07-12 DIAGNOSIS — Z12.31 SCREENING MAMMOGRAM FOR HIGH-RISK PATIENT: ICD-10-CM

## 2021-07-12 PROCEDURE — 77067 SCR MAMMO BI INCL CAD: CPT

## 2021-08-13 DIAGNOSIS — G40.019 LOCALIZATION-RELATED EPILEPSY, INTRACTABLE (HCC): ICD-10-CM

## 2021-08-17 RX ORDER — LEVETIRACETAM 1000 MG/1
TABLET ORAL
Qty: 540 TABLET | Refills: 3 | Status: SHIPPED | OUTPATIENT
Start: 2021-08-17 | End: 2021-09-16 | Stop reason: SDUPTHER

## 2021-09-16 ENCOUNTER — OFFICE VISIT (OUTPATIENT)
Dept: NEUROLOGY | Age: 66
End: 2021-09-16
Payer: MEDICARE

## 2021-09-16 VITALS
TEMPERATURE: 97.1 F | OXYGEN SATURATION: 100 % | DIASTOLIC BLOOD PRESSURE: 82 MMHG | WEIGHT: 207 LBS | BODY MASS INDEX: 36.67 KG/M2 | HEART RATE: 77 BPM | SYSTOLIC BLOOD PRESSURE: 112 MMHG

## 2021-09-16 DIAGNOSIS — J44.9 COPD WITH ASTHMA (HCC): ICD-10-CM

## 2021-09-16 DIAGNOSIS — G40.019 LOCALIZATION-RELATED EPILEPSY, INTRACTABLE (HCC): ICD-10-CM

## 2021-09-16 PROCEDURE — G9711 PT HX TOT COL OR COLON CA: HCPCS | Performed by: NURSE PRACTITIONER

## 2021-09-16 PROCEDURE — 99214 OFFICE O/P EST MOD 30 MIN: CPT | Performed by: NURSE PRACTITIONER

## 2021-09-16 PROCEDURE — G8427 DOCREV CUR MEDS BY ELIG CLIN: HCPCS | Performed by: NURSE PRACTITIONER

## 2021-09-16 PROCEDURE — G8417 CALC BMI ABV UP PARAM F/U: HCPCS | Performed by: NURSE PRACTITIONER

## 2021-09-16 PROCEDURE — G9899 SCRN MAM PERF RSLTS DOC: HCPCS | Performed by: NURSE PRACTITIONER

## 2021-09-16 PROCEDURE — G8432 DEP SCR NOT DOC, RNG: HCPCS | Performed by: NURSE PRACTITIONER

## 2021-09-16 RX ORDER — LEVETIRACETAM 1000 MG/1
TABLET ORAL
Qty: 540 TABLET | Refills: 3 | Status: SHIPPED | OUTPATIENT
Start: 2021-09-16 | End: 2022-09-21

## 2021-09-16 RX ORDER — ALBUTEROL SULFATE 90 UG/1
2 AEROSOL, METERED RESPIRATORY (INHALATION)
Qty: 1 EACH | Refills: 3 | Status: SHIPPED | OUTPATIENT
Start: 2021-09-16

## 2021-09-16 RX ORDER — OLMESARTAN MEDOXOMIL 5 MG/1
5 TABLET ORAL DAILY
COMMUNITY

## 2021-09-16 RX ORDER — MONTELUKAST SODIUM 10 MG/1
10 TABLET ORAL AT BEDTIME
COMMUNITY

## 2021-09-16 RX ORDER — FLUTICASONE PROPIONATE 50 MCG
1 SPRAY, SUSPENSION (ML) NASAL AS NEEDED
COMMUNITY

## 2021-09-16 RX ORDER — LACOSAMIDE 150 MG/1
TABLET ORAL
Qty: 180 TABLET | Refills: 3 | Status: SHIPPED | OUTPATIENT
Start: 2021-09-16 | End: 2022-03-20

## 2021-09-16 NOTE — PROGRESS NOTES
[address] [facility],[address]  [address]   [address] [facility] [address] [place] [address] Drive   [phone] Office   [phone] Fax           Date:  21     Name:  Magen Swan  :  1955  MRN:  [id number]     PCP:  Sandy Clark MD    HISTORY OF PRESENT ILLNESS: Patient presents today for follow up of epilepsy. She continues to take Keppra 3000mg twice a day and Vimpat 150mg twice a day. She denies having any seizures or aura. she only has aura if she takes her medication late. While she has been stable and has not had any complaints of toxicity or side effects from her medications, she has indicated that cost of the Vimpat is an issue. Recap from [facility]OV:  Epilepsy is stable on present therapy of Keppra 3000mg bid and Vimpat 150mg bid without signs or symptoms of side effect or toxicity. Mentioned today that she was being treated for COPD related asthma but has no rescue inhaler. Provided her with a Proventil HFA for treatment of acute asthma attack. Follow up in six months   Current Outpatient Medications   Medication Sig    denosumab (PROLIA) 60 mg/mL injection 60 mg by SubCUTAneous route.  fluticasone propionate (FLONASE) 50 mcg/actuation nasal spray 1 Spray by Nasal route as needed.  montelukast (SINGULAIR) 10 mg tablet Take 10 mg by mouth At bedtime.  olmesartan (BENICAR) 5 mg tablet Take 5 mg by mouth daily.  levETIRAcetam 1,000 mg tablet TAKE 3 TABLETS BY MOUTH TWICE A DAY    lacosamide (Vimpat) 150 mg tab tablet TAKE 1 TABLET BY MOUTH TWICE A DAY    albuterol (PROVENTIL HFA, VENTOLIN HFA, PROAIR HFA) 90 mcg/actuation inhaler Take 2 Puffs by inhalation every six (6) hours as needed for Wheezing.  ibuprofen 200 mg cap Take  by mouth.  loratadine-pseudoephedrine (Claritin-D 24 Hour)  mg per tablet Take 1 Tab by mouth daily.     Hydrochlorothiazide (HYDRODIURIL) 12.5 mg tablet     amLODIPine (NORVASC) 5 mg tablet Take 5 mg by mouth daily.  calcium carbonate (TUMS) 200 mg calcium (500 mg) chew Take 1 Tab by mouth two (2) times a day.  multivitamin (ONE A DAY) tablet Take 1 Tab by mouth daily.  ERGOCALCIFEROL, VITAMIN D2, (VITAMIN D PO) Take 1,000 Units by mouth daily. No current facility-administered medications for this visit.      Allergies   Allergen Reactions    Penicillins Itching     Past Medical History:   Diagnosis Date    Arthritis     Asthma     [facility] [address]     Colon cancer [facility])     2016    Fibromyalgia     GERD (gastroesophageal reflux disease)     Hypertension     Osteoporosis     Seizures ([facility] [address]     LAST SEIZURE 2013    Ulcer      Past Surgical History:   Procedure Laterality Date    COLONOSCOPY N/A 2019    [facility]COPY    HX BREAST BIOPSY Right     negative surgical biopsy    HX COLONOSCOPY  2016    HX HYSTERECTOMY      NEUROLOGICAL PROCEDURE UNLISTED      brain surgery for seizures     Social History     Socioeconomic History    Marital status:      Spouse name: Not on file    Number of children: Not on file    Years of education: Not on file    Highest education level: Not on file   Occupational History    Not on file   Tobacco Use    Smoking status: Former Smoker     Packs/day: 0.50     Years: 35.00     Pack years: 17.50     Quit date: 2019     Years since quittin.5    Smokeless tobacco: Never Used   Substance and Sexual Activity    Alcohol use: No    Drug use: No    Sexual activity: Not on file   Other Topics Concern    Not on file   Social History Narrative    Not on file     Social Determinants of Health     Financial Resource Strain:     Difficulty of Paying Living Expenses:    Food Insecurity:     Worried About Running Out of Food in the Last Year:     [address] in the Last Year:    Transportation Needs:     Lack of Transportation (Medical):  Lack of Transportation (Non-Medical):    Physical Activity:     Days of Exercise per Week:     Minutes of Exercise per Session:    Stress:     Feeling of Stress :    Social Connections:     Frequency of Communication with Friends and Family:     Frequency of Social Gatherings with Friends and Family:     Attends Taoist Services:     Active Member of Clubs or Organizations:     Attends Club or Organization Meetings:     Marital Status:    Intimate Partner Violence:     Fear of Current or Ex-Partner:     Emotionally Abused:     Physically Abused:     Sexually Abused:      Family History   Problem Relation Age of Onset    Dementia Mother     Cancer Mother         colon    Hypertension Mother     Dementia Father     Diabetes Father     Hypertension Father     Stroke Father     Crohn's Disease Father     Suicide Sister     Breast Cancer Sister         42's    Cancer Sister 46        breast       PHYSICAL EXAMINATION:    Visit Vitals  /82 (BP 1 Location: Left arm, BP Patient Position: Sitting, BP Cuff Size: Large adult)   Pulse 77   Temp 97.1 °F (36.2 °C) (Temporal)   Wt 93.9 kg (207 lb)   SpO2 100%   BMI 36.67 kg/m²     General:  Well defined, nourished, and groomed individual in no acute distress. Neck: Supple, nontender, no bruits, no pain with resistance to active range of motion. Heart: Regular rate and rhythm, no murmurs, rub, or gallop. Normal S1S2. Lungs:  Clear to auscultation bilaterally with equal chest expansion, no cough, no wheeze  Musculoskeletal:  Extremities revealed no edema and had full range of motion of joints. Psych:  Good mood and bright affect    NEUROLOGICAL EXAMINATION:     Mental Status:   Alert and oriented to person, place, and time with recent and remote memory intact. Attention span and concentration are normal. Speech is fluent with a full fund of knowledge.       Cranial Nerves:  I: smell Not tested   II: visual fields Full to confrontation   II: pupils Equal, round, reactive to light   II: optic disc No papilledema   III,VII: ptosis none   III,IV,VI: extraocular muscles  Full ROM   V: mastication normal   V: facial light touch sensation  normal   VII: facial muscle function   symmetric   VIII: hearing symmetric   IX: soft palate elevation  normal   XI: trapezius strength  5/5   XI: sternocleidomastoid strength 5/5   XI: neck flexion strength  5/5   XII: tongue  midline     Motor Examination: Normal tone, bulk, and strength, 5/5 muscle strength throughout. Coordination:  Finger to nose was normal.   No resting or intention tremor    Gait and Station:  Steady while walking. Normal arm swing. No pronator drift. No muscle wasting or fasiculations noted. Reflexes:  DTRs 2+ throughout. ASSESSMENT AND PLAN    ICD-10-CM ICD-9-CM    1. Localization-related epilepsy, intractable ([facility]  G40.019 345.51 levETIRAcetam 1,000 mg tablet      lacosamide (Vimpat) 150 mg tab tablet   2. COPD with asthma ([facility]  J44.9 493.20 albuterol (PROVENTIL HFA, VENTOLIN HFA, PROAIR HFA) 90 mcg/actuation inhaler     Epilepsy is stable on present therapy of Keppra and Vimpat. While the Vimpat is more expensive than she would like, this should be going to generic in 2022. For now, she indicates that she can afford it. Continue with previous medications as previously prescribed. Follow up in six months    [address] [facility].  Michell Reed

## 2021-09-16 NOTE — PROGRESS NOTES
Chief Complaint   Patient presents with    Epilepsy     Follow up for epilepsy; no noted seizures, though notes forgetting to take medication sometimes     Visit Vitals  /82 (BP 1 Location: Left arm, BP Patient Position: Sitting, BP Cuff Size: Large adult)   Pulse 77   Temp 97.1 °F (36.2 °C) (Temporal)   Wt 93.9 kg (207 lb)   SpO2 100%   BMI 36.67 kg/m²

## 2022-03-19 PROBLEM — E66.01 SEVERE OBESITY (HCC): Status: ACTIVE | Noted: 2019-09-18

## 2022-03-20 DIAGNOSIS — G40.019 LOCALIZATION-RELATED EPILEPSY, INTRACTABLE (HCC): ICD-10-CM

## 2022-03-20 RX ORDER — LACOSAMIDE 150 MG/1
TABLET ORAL
Qty: 60 TABLET | Refills: 2 | Status: SHIPPED | OUTPATIENT
Start: 2022-03-20 | End: 2022-06-08 | Stop reason: SDUPTHER

## 2022-05-16 ENCOUNTER — APPOINTMENT (OUTPATIENT)
Dept: GENERAL RADIOLOGY | Age: 67
End: 2022-05-16
Attending: EMERGENCY MEDICINE
Payer: MEDICARE

## 2022-05-16 ENCOUNTER — HOSPITAL ENCOUNTER (EMERGENCY)
Age: 67
Discharge: HOME | End: 2022-05-16
Attending: EMERGENCY MEDICINE
Payer: MEDICARE

## 2022-05-16 VITALS
HEIGHT: 64 IN | WEIGHT: 213.19 LBS | BODY MASS INDEX: 36.4 KG/M2 | OXYGEN SATURATION: 100 % | DIASTOLIC BLOOD PRESSURE: 91 MMHG | TEMPERATURE: 98.1 F | HEART RATE: 74 BPM | SYSTOLIC BLOOD PRESSURE: 125 MMHG | RESPIRATION RATE: 16 BRPM

## 2022-05-16 DIAGNOSIS — S92.512A CLOSED DISPLACED FRACTURE OF PROXIMAL PHALANX OF LESSER TOE OF LEFT FOOT, INITIAL ENCOUNTER: Primary | ICD-10-CM

## 2022-05-16 PROCEDURE — 99283 EMERGENCY DEPT VISIT LOW MDM: CPT

## 2022-05-16 PROCEDURE — 73630 X-RAY EXAM OF FOOT: CPT

## 2022-05-16 RX ORDER — HYDROCODONE BITARTRATE AND ACETAMINOPHEN 5; 325 MG/1; MG/1
1 TABLET ORAL
Qty: 9 TABLET | Refills: 0 | Status: SHIPPED | OUTPATIENT
Start: 2022-05-16 | End: 2022-05-19

## 2022-05-16 NOTE — ED PROVIDER NOTES
EMERGENCY DEPARTMENT HISTORY AND PHYSICAL EXAM      Date: 5/16/2022  Patient Name: Geovanny Silva    History of Presenting Illness     Chief Complaint   Patient presents with    Foot Injury     left foot injury thursday or friday ; she was walked across the carpet and her left foot flipped in stepping. no fall it bruised and swollen top of foot       History Provided By: Patient    HPI: Geovanny Silva, 73 y.o. female with a history of seizures, hypertension, fibromyalgia and others as below presents ambulatory to the ED with cc of about 3 days of 8 out of 10 constant, achy tenderness of the left fourth toe that is much worse with palpation and walking. She tells me late Thursday early Friday she was walking across the carpet when she accidentally stubbed her toe. She tells me she experienced a sudden, sharp pain in the toe and foot but has been able to walk since. She denies any fall. She tells me the swelling and pain were actually worse the next day. She tells me over the weekend she was able to rest it however the pain and swelling persist so she presents today for evaluation. She has been well lately without fever. There are no other complaints, changes, or physical findings at this time. PCP: Tej Mcgill MD    Current Outpatient Medications   Medication Sig Dispense Refill    HYDROcodone-acetaminophen (NORCO) 5-325 mg per tablet Take 1 Tablet by mouth every eight (8) hours as needed for Pain for up to 3 days. Max Daily Amount: 3 Tablets. 9 Tablet 0    lacosamide (Vimpat) 150 mg tab tablet TAKE 1 TABLET BY MOUTH TWICE A DAY 60 Tablet 2    denosumab (PROLIA) 60 mg/mL injection 60 mg by SubCUTAneous route.  fluticasone propionate (FLONASE) 50 mcg/actuation nasal spray 1 Spray by Nasal route as needed.  montelukast (SINGULAIR) 10 mg tablet Take 10 mg by mouth At bedtime.  olmesartan (BENICAR) 5 mg tablet Take 5 mg by mouth daily.       levETIRAcetam 1,000 mg tablet TAKE 3 TABLETS BY MOUTH TWICE A  Tablet 3    albuterol (PROVENTIL HFA, VENTOLIN HFA, PROAIR HFA) 90 mcg/actuation inhaler Take 2 Puffs by inhalation every six (6) hours as needed for Wheezing. 1 Each 3    ibuprofen 200 mg cap Take  by mouth.  loratadine-pseudoephedrine (Claritin-D 24 Hour)  mg per tablet Take 1 Tab by mouth daily.  Hydrochlorothiazide (HYDRODIURIL) 12.5 mg tablet   1    amLODIPine (NORVASC) 5 mg tablet Take 5 mg by mouth daily.  calcium carbonate (TUMS) 200 mg calcium (500 mg) chew Take 1 Tab by mouth two (2) times a day.  multivitamin (ONE A DAY) tablet Take 1 Tab by mouth daily.  ERGOCALCIFEROL, VITAMIN D2, (VITAMIN D PO) Take 1,000 Units by mouth daily.        Past History     Past Medical History:  Past Medical History:   Diagnosis Date    Arthritis     Asthma     [facility]     [facility])     February 2016    Fibromyalgia     GERD (gastroesophageal reflux disease)     Hypertension     Osteoporosis     [facility]     LAST SEIZURE 2013    Ulcer        Past Surgical History:  Past Surgical History:   Procedure Laterality Date    COLONOSCOPY N/A 4/16/2019    COL[facility]    HX BREAST BIOPSY Right     negative surgical biopsy    HX COLONOSCOPY  02/2016    HX HYSTERECTOMY      NEUROLOGICAL PROCEDURE UNLISTED  2009    brain surgery for seizures       Family History:  Family History   Problem Relation Age of Onset    Dementia Mother     Cancer Mother         colon    Hypertension Mother     Dementia Father     Diabetes Father     Hypertension Father     Stroke Father     Crohn's Disease Father     Suicide Sister     Breast Cancer Sister         42's    Cancer Sister 46        breast       Social History:  Social History     Tobacco Use    Smoking status: Former Smoker     Packs/day: 0.50     Years: 35.00     Pack years: 17.50     Quit date: 2/18/2019     Years since quitting: 3.2    Smokeless tobacco: Never Used   Substance Use Topics    Alcohol use: No    Drug use: No       Allergies: Allergies   Allergen Reactions    Penicillins Itching     Review of Systems   Review of Systems   Constitutional: Negative for fever. Musculoskeletal:        Left fourth toe pain   All other systems reviewed and are negative. Physical Exam   Physical Exam  Vitals and nursing note reviewed. Constitutional:       General: She is not in acute distress. Appearance: She is well-developed. She is not toxic-appearing. HENT:      Head: Normocephalic and atraumatic. Jaw: No trismus. Right Ear: External ear normal.      Left Ear: External ear normal.      Nose: Nose normal.      Mouth/Throat:      Pharynx: Uvula midline. Eyes:      General: No scleral icterus. Conjunctiva/sclera: Conjunctivae normal.      Pupils: Pupils are equal, round, and reactive to light. Cardiovascular:      Rate and Rhythm: Normal rate and regular rhythm. Pulmonary:      Effort: Pulmonary effort is normal. No tachypnea, accessory muscle usage or respiratory distress. Breath sounds: No decreased breath sounds or wheezing. Abdominal:      Palpations: Abdomen is soft. Tenderness: There is no abdominal tenderness. Musculoskeletal:         General: Normal range of motion. Cervical back: Full passive range of motion without pain and normal range of motion. Left foot: Tenderness and bony tenderness present. Feet:       Comments:   LEFT 4TH TOE:  There is bruising and swelling of the left fourth toe  There is no unusual redness  There is no break in the skin  There is local tenderness to palpation   Skin:     Findings: No rash. Neurological:      Mental Status: She is alert and oriented to person, place, and time. She is not disoriented. GCS: GCS eye subscore is 4. GCS verbal subscore is 5. GCS motor subscore is 6. Cranial Nerves: No cranial nerve deficit. Psychiatric:         Speech: Speech normal.       Diagnostic Study Results     Labs -   No results found for this or any previous visit (from the past 12 hour(s)). Radiologic Studies -   XR FOOT LT MIN 3 V   Final Result   Fourth proximal phalangeal fracture. CT Results  (Last 48 hours)    None        CXR Results  (Last 48 hours)    None        Medical Decision Making   I am the first provider for this patient. I reviewed the vital signs, available nursing notes, past medical history, past surgical history, family history and social history. Vital Signs-Reviewed the patient's vital signs. Patient Vitals for the past 12 hrs:   Temp Pulse Resp BP SpO2   05/16/22 1326 98.1 °F (36.7 °C) 74 16 (!) 125/91 100 %       Pulse Oximetry Analysis -100% on RA    Records Reviewed: Nursing Notes, Old Medical Records, Previous Radiology Studies, Previous Laboratory Studies and     Provider Notes (Medical Decision Making):   DDx: Fracture, sprain, contusion    Plain films demonstrate a mildly displaced fracture of the fourth proximal phalanx of the left foot. This is a closed injury and she is neurovascularly intact. I applied buddy tape and the patient is provided a hard soled shoe. I will send pain medication to the pharmacy. Refer to podiatry. ED Course:   Initial assessment performed. The patients presenting problems have been discussed, and they are in agreement with the care plan formulated and outlined with them. I have encouraged them to ask questions as they arise throughout their visit. Disposition:  Discharge    PLAN:  1. Discharge Medication List as of 5/16/2022  2:56 PM      START taking these medications    Details   HYDROcodone-acetaminophen (NORCO) 5-325 mg per tablet Take 1 Tablet by mouth every eight (8) hours as needed for Pain for up to 3 days.  Max Daily Amount: 3 Tablets., Normal, Disp-9 Tablet, R-0         CONTINUE these medications which have NOT CHANGED    Details lacosamide (Vimpat) 150 mg tab tablet TAKE 1 TABLET BY MOUTH TWICE A DAY, Normal, Disp-60 Tablet, R-2This request is for a new prescription for a controlled substance as required by Federal/State law. denosumab (PROLIA) 60 mg/mL injection 60 mg by SubCUTAneous route., Historical Med      fluticasone propionate (FLONASE) 50 mcg/actuation nasal spray 1 Spray by Nasal route as needed., Historical Med      montelukast (SINGULAIR) 10 mg tablet Take 10 mg by mouth At bedtime. , Historical Med      olmesartan (BENICAR) 5 mg tablet Take 5 mg by mouth daily. , Historical Med      levETIRAcetam 1,000 mg tablet TAKE 3 TABLETS BY MOUTH TWICE A DAY, Normal, Disp-540 Tablet, R-3      albuterol (PROVENTIL HFA, VENTOLIN HFA, PROAIR HFA) 90 mcg/actuation inhaler Take 2 Puffs by inhalation every six (6) hours as needed for Wheezing., Normal, Disp-1 Each, R-3      ibuprofen 200 mg cap Take  by mouth., Historical Med      loratadine-pseudoephedrine (Claritin-D 24 Hour)  mg per tablet Take 1 Tab by mouth daily. , Historical Med      Hydrochlorothiazide (HYDRODIURIL) 12.5 mg tablet Historical Med, R-1      amLODIPine (NORVASC) 5 mg tablet Take 5 mg by mouth daily. , Historical Med      calcium carbonate (TUMS) 200 mg calcium (500 mg) chew Take 1 Tab by mouth two (2) times a day., Historical Med      multivitamin (ONE A DAY) tablet Take 1 Tab by mouth daily. , Historical Med      ERGOCALCIFEROL, VITAMIN D2, (VITAMIN D PO) Take 1,000 Units by mouth daily. , Historical Med           2. Follow-up Information     Follow up With Specialties Details Why Contact Info    Hollie Lu DPM Foot and Ankle Surgery Call  PODIATRY: as needed [address] [facility]  [address]  [facility] [address] [phone]          Return to ED if worse     Diagnosis     Clinical Impression:   1.  Closed displaced fracture of proximal phalanx of lesser toe of left foot, initial encounter

## 2022-06-08 ENCOUNTER — OFFICE VISIT (OUTPATIENT)
Dept: NEUROLOGY | Age: 67
End: 2022-06-08
Payer: MEDICARE

## 2022-06-08 VITALS
WEIGHT: 214.3 LBS | SYSTOLIC BLOOD PRESSURE: 116 MMHG | DIASTOLIC BLOOD PRESSURE: 72 MMHG | HEIGHT: 63 IN | HEART RATE: 88 BPM | OXYGEN SATURATION: 98 % | TEMPERATURE: 97.2 F | BODY MASS INDEX: 37.97 KG/M2

## 2022-06-08 DIAGNOSIS — G40.019 LOCALIZATION-RELATED EPILEPSY, INTRACTABLE (HCC): ICD-10-CM

## 2022-06-08 PROCEDURE — 1090F PRES/ABSN URINE INCON ASSESS: CPT | Performed by: NURSE PRACTITIONER

## 2022-06-08 PROCEDURE — G8427 DOCREV CUR MEDS BY ELIG CLIN: HCPCS | Performed by: NURSE PRACTITIONER

## 2022-06-08 PROCEDURE — 99213 OFFICE O/P EST LOW 20 MIN: CPT | Performed by: NURSE PRACTITIONER

## 2022-06-08 PROCEDURE — G8417 CALC BMI ABV UP PARAM F/U: HCPCS | Performed by: NURSE PRACTITIONER

## 2022-06-08 PROCEDURE — G8536 NO DOC ELDER MAL SCRN: HCPCS | Performed by: NURSE PRACTITIONER

## 2022-06-08 PROCEDURE — 1123F ACP DISCUSS/DSCN MKR DOCD: CPT | Performed by: NURSE PRACTITIONER

## 2022-06-08 PROCEDURE — G8432 DEP SCR NOT DOC, RNG: HCPCS | Performed by: NURSE PRACTITIONER

## 2022-06-08 PROCEDURE — 1101F PT FALLS ASSESS-DOCD LE1/YR: CPT | Performed by: NURSE PRACTITIONER

## 2022-06-08 PROCEDURE — G8399 PT W/DXA RESULTS DOCUMENT: HCPCS | Performed by: NURSE PRACTITIONER

## 2022-06-08 PROCEDURE — G9711 PT HX TOT COL OR COLON CA: HCPCS | Performed by: NURSE PRACTITIONER

## 2022-06-08 PROCEDURE — G9899 SCRN MAM PERF RSLTS DOC: HCPCS | Performed by: NURSE PRACTITIONER

## 2022-06-08 RX ORDER — ATORVASTATIN CALCIUM 10 MG/1
10 TABLET, FILM COATED ORAL DAILY
COMMUNITY

## 2022-06-08 RX ORDER — LACOSAMIDE 150 MG/1
TABLET ORAL
Qty: 180 TABLET | Refills: 2 | Status: SHIPPED | OUTPATIENT
Start: 2022-06-08

## 2022-06-08 NOTE — PROGRESS NOTES
[address] [facility],[address]  [address]   [place] [address] [place] [address]   [phone] Office   [phone] Fax           Date:  22     Name:  Juanjo Fry  :  1955  MRN:  [id number]     PCP:  Kishore Snyder MD    HISTORY OF PRESENT ILLNESS: Follow up of epilepsy. She continues to take Keppra 3000mg twice a day and Vimpat 150mg twice a day. Since her last office visit, she has started to take the generic of the Vimpat. She denies having any seizures or aura since the switch to generic. She has been stable and has not had any complaints of toxicity or side effects from her medications. Recap from LOV:  Epilepsy is stable on present therapy of Keppra and Vimpat. While the Vimpat is more expensive than she would like, this should be going to generic in . For now, she indicates that she can afford it. Continue with previous medications as previously prescribed. Follow up in six months     Current Outpatient Medications   Medication Sig    atorvastatin (LIPITOR) 10 mg tablet Take 10 mg by mouth daily.  lacosamide (Vimpat) 150 mg tab tablet TAKE 1 TABLET BY MOUTH TWICE A DAY    denosumab (PROLIA) 60 mg/mL injection 60 mg by SubCUTAneous route.  fluticasone propionate (FLONASE) 50 mcg/actuation nasal spray 1 Spray by Nasal route as needed.  montelukast (SINGULAIR) 10 mg tablet Take 10 mg by mouth At bedtime.  olmesartan (BENICAR) 5 mg tablet Take 5 mg by mouth daily.  levETIRAcetam 1,000 mg tablet TAKE 3 TABLETS BY MOUTH TWICE A DAY    albuterol (PROVENTIL HFA, VENTOLIN HFA, PROAIR HFA) 90 mcg/actuation inhaler Take 2 Puffs by inhalation every six (6) hours as needed for Wheezing.  ibuprofen 200 mg cap Take  by mouth.  loratadine-pseudoephedrine (Claritin-D 24 Hour)  mg per tablet Take 1 Tab by mouth daily.     Hydrochlorothiazide (HYDRODIURIL) 12.5 mg tablet     amLODIPine (NORVASC) 5 mg tablet Take 5 mg by mouth daily.  calcium carbonate (TUMS) 200 mg calcium (500 mg) chew Take 1 Tab by mouth two (2) times a day.  multivitamin (ONE A DAY) tablet Take 1 Tab by mouth daily.  ERGOCALCIFEROL, VITAMIN D2, (VITAMIN D PO) Take 1,000 Units by mouth daily. No current facility-administered medications for this visit.      Allergies   Allergen Reactions    Penicillins Itching     Past Medical History:   Diagnosis Date    Arthritis     Asthma     [facility] ([facility]     [facility])     February 2016    Fibromyalgia     GERD (gastroesophageal reflux disease)     Hypertension     Osteoporosis     Seizures ([facility]     LAST SEIZURE 2013    Ulcer      Past Surgical History:   Procedure Laterality Date    COLONOSCOPY N/A 4/16/2019    COLON[facility] at [facility] ENDOSCOPY    HX BREAST BIOPSY Right     negative surgical biopsy    HX COLONOSCOPY  02/2016    HX HYSTERECTOMY      NEUROLOGICAL PROCEDURE UNLISTED  2009    brain surgery for seizures     Social History     Socioeconomic History    Marital status:      Spouse name: Not on file    Number of children: Not on file    Years of education: Not on file    Highest education level: Not on file   Occupational History    Not on file   Tobacco Use    Smoking status: Former Smoker     Packs/day: 0.50     Years: 35.00     Pack years: 17.50     Quit date: 2/18/2019     Years since quitting: 3.3    Smokeless tobacco: Never Used   Substance and Sexual Activity    Alcohol use: No    Drug use: No    Sexual activity: Not on file   Other Topics Concern    Not on file   Social History Narrative    Not on file     Social Determinants of Health     Financial Resource Strain:     Difficulty of Paying Living Expenses: Not on file   Food Insecurity:     Worried About [address] in the Last Year: Not on file    [place] of Food in the Last Year: Not on ARLENE Tanner Needs:     Lack of Transportation (Medical): Not on file    Lack of Transportation (Non-Medical): Not on file   Physical Activity:     Days of Exercise per Week: Not on file    Minutes of Exercise per Session: Not on file   Stress:     Feeling of Stress : Not on file   Social Connections:     Frequency of Communication with Friends and Family: Not on file    Frequency of Social Gatherings with Friends and Family: Not on file    Attends [employer] Services: Not on file    Active Member of Clubs or Organizations: Not on file    Attends Club or Organization Meetings: Not on file    Marital Status: Not on file   Intimate Partner Violence:     Fear of Current or Ex-Partner: Not on file    Emotionally Abused: Not on file    Physically Abused: Not on file    Sexually Abused: Not on file   Housing Stability:     Unable to Pay for Housing in the Last Year: Not on file    Number of [place] in the Last Year: Not on file    Unstable Housing in the Last Year: Not on file     Family History   Problem Relation Age of Onset    Dementia Mother     Cancer Mother         colon    Hypertension Mother     Dementia Father     Diabetes Father     Hypertension Father     Stroke Father     Crohn's Disease Father     Suicide Sister     Breast Cancer Sister         42's    Cancer Sister 46        breast       PHYSICAL EXAMINATION:    Visit Vitals  /72   Pulse 88   Temp 97.2 °F (36.2 °C)   Ht 5' 3\" (1.6 m)   Wt 97.2 kg (214 lb 4.8 oz)   SpO2 98%   BMI 37.96 kg/m²     General:  Well defined, nourished, and groomed individual in no acute distress. Neck: Supple, nontender, no bruits, no pain with resistance to active range of motion. Heart: Regular rate and rhythm, no murmurs, rub, or gallop. Normal S1S2. Lungs:  Clear to auscultation bilaterally with equal chest expansion, no cough, no wheeze  Musculoskeletal:  Extremities revealed no edema and had full range of motion of joints.     Psych:  Lorenda Angle mood and bright affect    NEUROLOGICAL EXAMINATION:     Mental Status:   Alert and oriented to person, place, and time with recent and remote memory intact. Attention span and concentration are normal. Speech is fluent with a full fund of knowledge. Cranial Nerves:  I: smell Not tested   II: visual fields Full to confrontation   II: pupils Equal, round, reactive to light   II: optic disc No papilledema   III,VII: ptosis none   III,IV,VI: extraocular muscles  Full ROM   V: mastication normal   V: facial light touch sensation  normal   VII: facial muscle function   symmetric   VIII: hearing symmetric   IX: soft palate elevation  normal   XI: trapezius strength  5/5   XI: sternocleidomastoid strength 5/5   XI: neck flexion strength  5/5   XII: tongue  midline     Motor Examination: Normal tone, bulk, and strength, 5/5 muscle strength throughout. Coordination:  Finger to nose was normal.   No resting or intention tremor    Gait and Station:  Steady while walking. Normal arm swing. No pronator drift. No muscle wasting or fasiculations noted. Reflexes:  DTRs 2+ throughout. ASSESSMENT AND PLAN    ICD-10-CM ICD-9-CM    1. Localization-related epilepsy, intractable ([facility])  G40.019 345.51 lacosamide (Vimpat) 150 mg tab tablet     Epilepsy is stable on present therapy of Keppra 3000mg twice daily and Vimpat 150mg twice daily without signs or symptoms of side effect or toxicity. Now that both are generic, they are much more affordable and luckily she has not had any issues since being switched over. Follow up in six months. Melyssa Brooks

## 2022-06-08 NOTE — LETTER
6/8/2022    Patient: Gladys Jaramillo   YOB: 1955   Date of Visit: 6/8/2022     Lai Castano MD  [id number] [address]  Via Fax: [phone]    Dear Lai Castano MD,      Thank you for referring Ms. Rodriguez Rater to [facility] for evaluation. My notes for this consultation are attached. If you have questions, please do not hesitate to call me. I look forward to following your patient along with you.       Sincerely,    Howie Ramos NP

## 2022-06-17 ENCOUNTER — TRANSCRIBE ORDER (OUTPATIENT)
Dept: SCHEDULING | Age: 67
End: 2022-06-17

## 2022-06-17 DIAGNOSIS — Z12.31 VISIT FOR SCREENING MAMMOGRAM: Primary | ICD-10-CM

## 2022-07-14 ENCOUNTER — HOSPITAL ENCOUNTER (OUTPATIENT)
Dept: MAMMOGRAPHY | Age: 67
Discharge: HOME | End: 2022-07-14
Attending: FAMILY MEDICINE
Payer: MEDICARE

## 2022-07-14 DIAGNOSIS — Z12.31 VISIT FOR SCREENING MAMMOGRAM: ICD-10-CM

## 2022-07-14 PROCEDURE — 77067 SCR MAMMO BI INCL CAD: CPT

## 2022-09-09 ENCOUNTER — TRANSCRIBE ORDER (OUTPATIENT)
Dept: SCHEDULING | Age: 67
End: 2022-09-09

## 2022-09-09 DIAGNOSIS — F17.211 CIGARETTE NICOTINE DEPENDENCE IN REMISSION: Primary | ICD-10-CM

## 2022-09-19 DIAGNOSIS — G40.019 LOCALIZATION-RELATED EPILEPSY, INTRACTABLE (HCC): ICD-10-CM

## 2022-09-21 RX ORDER — LEVETIRACETAM 1000 MG/1
TABLET ORAL
Qty: 540 TABLET | Refills: 3 | Status: SHIPPED | OUTPATIENT
Start: 2022-09-21

## 2022-09-30 ENCOUNTER — HOSPITAL ENCOUNTER (OUTPATIENT)
Dept: CT IMAGING | Age: 67
Discharge: HOME | End: 2022-09-30
Attending: FAMILY MEDICINE
Payer: MEDICARE

## 2022-09-30 DIAGNOSIS — F17.211 CIGARETTE NICOTINE DEPENDENCE IN REMISSION: ICD-10-CM

## 2022-09-30 PROCEDURE — 71271 CT THORAX LUNG CANCER SCR C-: CPT

## 2022-12-22 DIAGNOSIS — J44.9 COPD WITH ASTHMA (HCC): ICD-10-CM

## 2022-12-23 RX ORDER — ALBUTEROL SULFATE 90 UG/1
AEROSOL, METERED RESPIRATORY (INHALATION)
Qty: 6.7 EACH | Refills: 3 | Status: SHIPPED | OUTPATIENT
Start: 2022-12-23

## 2022-12-28 DIAGNOSIS — G40.019 LOCALIZATION-RELATED EPILEPSY, INTRACTABLE (HCC): ICD-10-CM

## 2022-12-29 RX ORDER — LACOSAMIDE 150 MG/1
TABLET ORAL
Qty: 180 TABLET | Refills: 2 | Status: SHIPPED | OUTPATIENT
Start: 2022-12-29

## 2023-03-01 ENCOUNTER — VIRTUAL VISIT (OUTPATIENT)
Dept: NEUROLOGY | Age: 68
End: 2023-03-01
Payer: MEDICARE

## 2023-03-01 DIAGNOSIS — R41.3 MEMORY LOSS: ICD-10-CM

## 2023-03-01 DIAGNOSIS — G31.84 MCI (MILD COGNITIVE IMPAIRMENT): ICD-10-CM

## 2023-03-01 DIAGNOSIS — G40.019 LOCALIZATION-RELATED EPILEPSY, INTRACTABLE (HCC): Primary | ICD-10-CM

## 2023-03-01 PROCEDURE — 99214 OFFICE O/P EST MOD 30 MIN: CPT | Performed by: NURSE PRACTITIONER

## 2023-03-01 PROCEDURE — G9899 SCRN MAM PERF RSLTS DOC: HCPCS | Performed by: NURSE PRACTITIONER

## 2023-03-01 PROCEDURE — 1090F PRES/ABSN URINE INCON ASSESS: CPT | Performed by: NURSE PRACTITIONER

## 2023-03-01 PROCEDURE — G8427 DOCREV CUR MEDS BY ELIG CLIN: HCPCS | Performed by: NURSE PRACTITIONER

## 2023-03-01 PROCEDURE — G9711 PT HX TOT COL OR COLON CA: HCPCS | Performed by: NURSE PRACTITIONER

## 2023-03-01 PROCEDURE — G8399 PT W/DXA RESULTS DOCUMENT: HCPCS | Performed by: NURSE PRACTITIONER

## 2023-03-01 PROCEDURE — 1123F ACP DISCUSS/DSCN MKR DOCD: CPT | Performed by: NURSE PRACTITIONER

## 2023-03-01 PROCEDURE — 1101F PT FALLS ASSESS-DOCD LE1/YR: CPT | Performed by: NURSE PRACTITIONER

## 2023-03-01 PROCEDURE — G8432 DEP SCR NOT DOC, RNG: HCPCS | Performed by: NURSE PRACTITIONER

## 2023-03-01 RX ORDER — LEVETIRACETAM 1000 MG/1
TABLET ORAL
Qty: 540 TABLET | Refills: 3 | Status: SHIPPED | OUTPATIENT
Start: 2023-03-01

## 2023-03-01 RX ORDER — LACOSAMIDE 150 MG/1
150 TABLET ORAL 2 TIMES DAILY
Qty: 180 TABLET | Refills: 2 | Status: SHIPPED | OUTPATIENT
Start: 2023-03-01

## 2023-03-01 NOTE — PROGRESS NOTES
[address] [facility],5Th [address]  [address]   [address] [place] [address] [place] [address] [address]   [phone] Office   [phone] Fax           Date:  23     Name:  Igor Noe  :  1955  MRN:  [id number]     PCP:  MD Marshal Riddleeileen Nicole is a 78 y.o. female who was seen by synchronous (real-time) audio-video technology on 3/1/2023 for Epilepsy    Subjective:   No seizure or seizure aura since her last office visit. Her only complaint is that she will sometimes have issues with her memory. This seems to be related to recalling names of people she does not always see. She feels like she has to write things down more often though she states she does not have any issues with grocery shopping without a list. This is a subjective complaint as her family has not mentioned it. She does have a family history of dementia in both her parents. Current Outpatient Medications   Medication Sig    lacosamide (VIMPAT) 150 mg tab tablet TAKE 1 TABLET BY MOUTH TWICE A DAY    albuterol (PROVENTIL HFA, VENTOLIN HFA, PROAIR HFA) 90 mcg/actuation inhaler INHALE 2 PUFFS EVERY 6 HOURS AS NEEDED FOR WHEEZE    levETIRAcetam 1,000 mg tablet TAKE 3 TABLETS BY MOUTH TWICE A DAY    atorvastatin (LIPITOR) 10 mg tablet Take 10 mg by mouth daily. denosumab (PROLIA) 60 mg/mL injection 60 mg by SubCUTAneous route. fluticasone propionate (FLONASE) 50 mcg/actuation nasal spray 1 Spray by Nasal route as needed. montelukast (SINGULAIR) 10 mg tablet Take 10 mg by mouth At bedtime. olmesartan (BENICAR) 5 mg tablet Take 5 mg by mouth daily. ibuprofen 200 mg cap Take  by mouth.    loratadine-pseudoephedrine (Claritin-D 24 Hour)  mg per tablet Take 1 Tab by mouth daily. Hydrochlorothiazide (HYDRODIURIL) 12.5 mg tablet     amLODIPine (NORVASC) 5 mg tablet Take 5 mg by mouth daily.     calcium carbonate (TUMS) 200 mg calcium (500 mg) chew Take 1 Tab by mouth two (2) times a day. multivitamin (ONE A DAY) tablet Take 1 Tab by mouth daily. ERGOCALCIFEROL, VITAMIN D2, (VITAMIN D PO) Take 1,000 Units by mouth daily. No current facility-administered medications for this visit. Allergies   Allergen Reactions    Penicillins Itching      Past Medical History:   Diagnosis Date    Arthritis     Asthma     Cervical cancer ([facility]     Colon cancer [facility])     February 2016    Fibromyalgia     GERD (gastroesophageal reflux disease)     Hypertension     Osteoporosis     Seizures ([facility]     LAST SEIZURE 2013    Ulcer      Past Surgical History:   Procedure Laterality Date    COLONOSCOPY N/A 4/16/2019    COLONOSCOPY performed by Mac Martin MD at [facility] ENDOSCOPY    HX BREAST BIOPSY Right     negative surgical biopsy    HX COLONOSCOPY  02/2016    HX HYSTERECTOMY      [facility] UNLISTED NEUROLOGICAL/NEUROMUSCULAR DX [facility]  2009    brain surgery for seizures      reports that she quit smoking about 4 years ago. She has a 17.50 pack-year smoking history. She has never used smokeless tobacco. She reports that she does not drink alcohol and does not use drugs. family history includes Breast Cancer in her sister; Cancer in her mother; Cancer (age of onset: 46) in her sister; Crohn's Disease in her father; Dementia in her father and mother; Diabetes in her father; Hypertension in her father and mother; Stroke in her father; Suicide in her sister. ROS    Objective:   No flowsheet data found. General:  Well defined, nourished, and groomed individual in no acute distress. Psych:  Good mood and bright affect    NEUROLOGICAL EXAMINATION:     Mental Status:   Alert and oriented to person, place, and time with recent and remote memory intact. Attention span and concentration are normal. Speech is fluent with a full fund of knowledge.       Cranial Nerves:  I: smell Not tested   II: visual fields Not assessed   II: pupils Equal, round, reactive to light   II: optic disc Not assessed   III,VII: ptosis none   III,IV,VI: extraocular muscles  Full ROM   V: mastication normal   V: facial light touch sensation  Not assessed   VII: facial muscle function   symmetric   VIII: hearing symmetric   IX: soft palate elevation  normal   XI: trapezius strength  Not assessed   XI: sternocleidomastoid strength Not assessed   XI: neck flexion strength  Not assessed   XII: tongue  midline     Motor Examination: Normal tone and bulk. Strength was not assessed      Sensory exam:  Not assessed     Coordination:  No resting or intention tremor    Gait and Station:  Steady while walking. No muscle wasting or fasiculations noted. Reflexes:  Not assessed    Assessment & Plan:       ICD-10-CM ICD-9-CM    1. 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 MCI (mild cognitive impairment)  G31.84 331.83 TSH 3RD GENERATION      T4, FREE      REFERRAL TO NEUROPSYCHOLOGY      VITAMIN B12 & FOLATE      MRI BRAIN W WO CONT      TSH 3RD GENERATION      T4, FREE      VITAMIN B12 & FOLATE      3. Memory loss  R41.3 780.93 TSH 3RD GENERATION      T4, FREE      REFERRAL TO NEUROPSYCHOLOGY      VITAMIN B12 & FOLATE      MRI BRAIN W WO CONT      TSH 3RD GENERATION      T4, FREE      VITAMIN B12 & FOLATE        Epilepsy has been stable on present therapy of Vimpat and Keppra without signs or symptoms of side effect or toxicity. She will continue with this without change. She does have a new concern today in terms of her memory. While she has indicated that her family has not mentioned any changes, she feels that her memory has been worse.  She does have a family history and this in combination with her epilepsy and previous craniotomy would place her at higher risk for development of a dementing process. Will initiate an initial work up for this to include labs and brain imaging and neurocognitive assessment    Follow up after testing. We discussed the expected course, resolution and complications of the diagnosis(es) in detail. Medication risks, benefits, costs, interactions, and alternatives were discussed as indicated. I advised her to contact the office if her condition worsens, changes or fails to improve as anticipated. She expressed understanding with the diagnosis(es) and plan. Vivian Hilario, was evaluated through a synchronous (real-time) audio-video encounter. The patient (or guardian if applicable) is aware that this is a billable service, which includes applicable co-pays. This Virtual Visit was conducted with patient's (and/or legal guardian's) consent. The visit was conducted pursuant to the emergency declaration under the [facility], [address] [place] [address] [facility] authority and the [employer] General Act. Patient identification was verified, and a caregiver was present when appropriate.   The patient was located at: Home: [place] [address]  The provider was located at: Home: [address] [place] [address]

## 2023-06-22 ENCOUNTER — TRANSCRIBE ORDERS (OUTPATIENT)
Facility: HOSPITAL | Age: 68
End: 2023-06-22

## 2023-06-22 DIAGNOSIS — Z12.31 SCREENING MAMMOGRAM FOR BREAST CANCER: Primary | ICD-10-CM

## 2023-07-17 ENCOUNTER — OFFICE VISIT (OUTPATIENT)
Age: 68
End: 2023-07-17
Payer: MEDICARE

## 2023-07-17 DIAGNOSIS — F03.92 DEMENTIA WITH PSYCHOSIS (HCC): Primary | ICD-10-CM

## 2023-07-17 DIAGNOSIS — G40.019 LOCALIZATION-RELATED (FOCAL) (PARTIAL) IDIOPATHIC EPILEPSY AND EPILEPTIC SYNDROMES WITH SEIZURES OF LOCALIZED ONSET, INTRACTABLE, WITHOUT STATUS EPILEPTICUS (HCC): ICD-10-CM

## 2023-07-17 DIAGNOSIS — F41.9 CHRONIC ANXIETY: ICD-10-CM

## 2023-07-17 PROCEDURE — 1036F TOBACCO NON-USER: CPT | Performed by: CLINICAL NEUROPSYCHOLOGIST

## 2023-07-17 PROCEDURE — 90791 PSYCH DIAGNOSTIC EVALUATION: CPT | Performed by: CLINICAL NEUROPSYCHOLOGIST

## 2023-07-17 PROCEDURE — 1123F ACP DISCUSS/DSCN MKR DOCD: CPT | Performed by: CLINICAL NEUROPSYCHOLOGIST

## 2023-07-17 NOTE — PROGRESS NOTES
[address] [facility]  [address] [address] [facility], [address] [place] [address]   [phone] Office   [phone] Fax      Neuropsychology    Exam # 2    Initial Diagnostic Interview Note      Referral:  Randi Jaimes MD    Jessy Baltazar is a 78 y.o. right handed   female who was accompanied by her spouse to the initial clinical interview on 7/17/23. Please refer to her medical records for details pertaining to her history. At the start of the appointment, I reviewed the patient's [facility] Epic Chart (including Media scanned in from previous providers) for the active Problem List, all pertinent Past Medical Hx, medications, recent radiologic and laboratory findings. In addition, I reviewed pt's documented Immunization Record and Encounter History. The patient  has a past medical history of Arthritis, Asthma, Cervical cancer ([address]), Colon cancer ([address]), Fibromyalgia, GERD (gastroesophageal reflux disease), Hypertension, Osteoporosis, Seizures ([address]), and Ulcer. He  has a past surgical history that includes Colonoscopy (N/A, 4/16/2019); Colonoscopy (02/2016); neurological surgery (2009); Hysterectomy; and Breast biopsy (Right). 12th grade completed without history of previously diagnosed LD and/or receipt of special education. 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 She has noticed a progressive decline in short term memory worsening over the past 1-2 years. Her memory \"sucks. \"  She has forgetfulness. Grocery store- buys too many things. Misplaces thing. Starts task and does not complete. Loses words. Loses train of thought. Spouse says it going on for four years. She drives without issue and does her own medication. Her finances have gotten a bit messed up this month but generally okay. She does her own ADLs. Handwriting has declined.  Spouse noticed

## 2023-07-18 ENCOUNTER — PROCEDURE VISIT (OUTPATIENT)
Age: 68
End: 2023-07-18
Payer: MEDICARE

## 2023-07-18 DIAGNOSIS — G31.84 MILD COGNITIVE IMPAIRMENT WITH MEMORY LOSS: Primary | ICD-10-CM

## 2023-07-18 DIAGNOSIS — F43.21 ADJUSTMENT DISORDER WITH DEPRESSED MOOD: ICD-10-CM

## 2023-07-18 DIAGNOSIS — F41.9 MODERATE ANXIETY: ICD-10-CM

## 2023-07-18 PROCEDURE — 96132 NRPSYC TST EVAL PHYS/QHP 1ST: CPT | Performed by: CLINICAL NEUROPSYCHOLOGIST

## 2023-07-18 PROCEDURE — 96138 PSYCL/NRPSYC TECH 1ST: CPT | Performed by: CLINICAL NEUROPSYCHOLOGIST

## 2023-07-18 PROCEDURE — 96139 PSYCL/NRPSYC TST TECH EA: CPT | Performed by: CLINICAL NEUROPSYCHOLOGIST

## 2023-07-18 PROCEDURE — 96136 PSYCL/NRPSYC TST PHY/QHP 1ST: CPT | Performed by: CLINICAL NEUROPSYCHOLOGIST

## 2023-07-18 PROCEDURE — 96137 PSYCL/NRPSYC TST PHY/QHP EA: CPT | Performed by: CLINICAL NEUROPSYCHOLOGIST

## 2023-07-18 PROCEDURE — 96133 NRPSYC TST EVAL PHYS/QHP EA: CPT | Performed by: CLINICAL NEUROPSYCHOLOGIST

## 2023-07-21 ENCOUNTER — HOSPITAL ENCOUNTER (OUTPATIENT)
Facility: HOSPITAL | Age: 68
End: 2023-07-21
Attending: FAMILY MEDICINE
Payer: MEDICARE

## 2023-07-21 DIAGNOSIS — Z12.31 SCREENING MAMMOGRAM FOR BREAST CANCER: ICD-10-CM

## 2023-07-21 PROCEDURE — 77063 BREAST TOMOSYNTHESIS BI: CPT

## 2023-09-16 DIAGNOSIS — G40.019 LOCALIZATION-RELATED (FOCAL) (PARTIAL) IDIOPATHIC EPILEPSY AND EPILEPTIC SYNDROMES WITH SEIZURES OF LOCALIZED ONSET, INTRACTABLE, WITHOUT STATUS EPILEPTICUS (HCC): ICD-10-CM

## 2023-09-17 RX ORDER — LACOSAMIDE 150 MG/1
150 TABLET ORAL 2 TIMES DAILY
Qty: 180 TABLET | OUTPATIENT
Start: 2023-09-17

## 2023-09-20 DIAGNOSIS — G40.019 LOCALIZATION-RELATED (FOCAL) (PARTIAL) IDIOPATHIC EPILEPSY AND EPILEPTIC SYNDROMES WITH SEIZURES OF LOCALIZED ONSET, INTRACTABLE, WITHOUT STATUS EPILEPTICUS (HCC): ICD-10-CM

## 2023-09-20 RX ORDER — LACOSAMIDE 150 MG/1
150 TABLET ORAL 2 TIMES DAILY
Qty: 180 TABLET | Refills: 1 | Status: SHIPPED | OUTPATIENT
Start: 2023-09-20 | End: 2024-09-18

## 2023-09-20 RX ORDER — LACOSAMIDE 150 MG/1
150 TABLET ORAL 2 TIMES DAILY
Qty: 180 TABLET | OUTPATIENT
Start: 2023-09-20

## 2023-09-28 DIAGNOSIS — G40.019 LOCALIZATION-RELATED (FOCAL) (PARTIAL) IDIOPATHIC EPILEPSY AND EPILEPTIC SYNDROMES WITH SEIZURES OF LOCALIZED ONSET, INTRACTABLE, WITHOUT STATUS EPILEPTICUS (HCC): ICD-10-CM

## 2023-10-02 RX ORDER — LACOSAMIDE 150 MG/1
150 TABLET ORAL 2 TIMES DAILY
Qty: 180 TABLET | Refills: 3 | Status: SHIPPED | OUTPATIENT
Start: 2023-10-02 | End: 2024-09-26

## 2023-10-04 ENCOUNTER — HOSPITAL ENCOUNTER (OUTPATIENT)
Facility: HOSPITAL | Age: 68
Discharge: HOME | End: 2023-10-07
Attending: FAMILY MEDICINE
Payer: MEDICARE

## 2023-10-04 DIAGNOSIS — F17.201 NICOTINE DEPENDENCE IN REMISSION, UNSPECIFIED NICOTINE PRODUCT TYPE: ICD-10-CM

## 2023-10-04 PROCEDURE — 71271 CT THORAX LUNG CANCER SCR C-: CPT

## 2023-10-10 ENCOUNTER — OFFICE VISIT (OUTPATIENT)
Age: 68
End: 2023-10-10
Payer: MEDICARE

## 2023-10-10 DIAGNOSIS — F41.9 MODERATE ANXIETY: ICD-10-CM

## 2023-10-10 DIAGNOSIS — F03.92 DEMENTIA WITH PSYCHOSIS (HCC): Primary | ICD-10-CM

## 2023-10-10 DIAGNOSIS — F43.21 ADJUSTMENT DISORDER WITH DEPRESSED MOOD: ICD-10-CM

## 2023-10-10 PROCEDURE — 90785 PSYTX COMPLEX INTERACTIVE: CPT | Performed by: CLINICAL NEUROPSYCHOLOGIST

## 2023-10-10 PROCEDURE — 1123F ACP DISCUSS/DSCN MKR DOCD: CPT | Performed by: CLINICAL NEUROPSYCHOLOGIST

## 2023-10-10 PROCEDURE — 1036F TOBACCO NON-USER: CPT | Performed by: CLINICAL NEUROPSYCHOLOGIST

## 2023-10-10 PROCEDURE — 90832 PSYTX W PT 30 MINUTES: CPT | Performed by: CLINICAL NEUROPSYCHOLOGIST

## 2023-10-10 NOTE — PROGRESS NOTES
Prior to seeing the patient I reviewed the records, including the previously completed report, the records in [place], and any updated visits from other providers since I saw the patient last.      Today, I engaged in a psychoeducational and supportive and cognitive/behavioral psychotherapy session with the patient and spouse. I provided psychotherapy in the form of psychoeducation and support with respect to the results of the recent Neuropsychological Evaluation, including discussing individual tests as well as patient's areas of neurocognitive strength versus weakness. We discussed, in detail, the following: This is the patient's second evaluation of neurocognitive and psychologic status. On first exam, quite mild problems with memory were noted. Comparison of current with previous testing shows a very slight and still mild memory related concern. In general, her formal performances across all other neurocognitive domains assessed are normal.  From an emotional standpoint, she reports moderate anxiety and mild adjustment related depressive symptomatology. Patient and family reported mild progression in memory related problems over time. In addition to continued medical care, I do recommend treatment for memory related concerns as well as a review of her psychiatric medication management especially for anxiety. Active engagement in counseling may prove helpful. She does need supervision for medications and for finances. I am not concerned about driving or other major issues. She retains capacity at this time. She should be encouraged to remain as mentally, physically, and socially active as possible. Follow up yearly. We now have updated data on her. Clinical correlation is, of course, indicated. Education was provided regarding my diagnostic impressions, and we discussed treatment plan/options.    I also answered numerous questions related to the clinical

## 2023-10-27 ENCOUNTER — HOSPITAL ENCOUNTER (EMERGENCY)
Facility: HOSPITAL | Age: 68
Discharge: HOME | End: 2023-10-27
Attending: EMERGENCY MEDICINE
Payer: MEDICARE

## 2023-10-27 VITALS
HEART RATE: 75 BPM | HEIGHT: 63 IN | DIASTOLIC BLOOD PRESSURE: 95 MMHG | SYSTOLIC BLOOD PRESSURE: 143 MMHG | OXYGEN SATURATION: 100 % | RESPIRATION RATE: 16 BRPM | TEMPERATURE: 98.1 F | BODY MASS INDEX: 34.91 KG/M2 | WEIGHT: 197 LBS

## 2023-10-27 DIAGNOSIS — Z20.822 CLOSE EXPOSURE TO COVID-19 VIRUS: Primary | ICD-10-CM

## 2023-10-27 DIAGNOSIS — R09.81 NASAL CONGESTION: ICD-10-CM

## 2023-10-27 PROCEDURE — 99283 EMERGENCY DEPT VISIT LOW MDM: CPT

## 2023-10-27 PROCEDURE — 87636 SARSCOV2 & INF A&B AMP PRB: CPT

## 2023-10-27 ASSESSMENT — PAIN - FUNCTIONAL ASSESSMENT
PAIN_FUNCTIONAL_ASSESSMENT: NONE - DENIES PAIN
PAIN_FUNCTIONAL_ASSESSMENT: NONE - DENIES PAIN

## 2023-10-27 ASSESSMENT — ENCOUNTER SYMPTOMS
EYE PAIN: 0
COUGH: 0
RHINORRHEA: 0
DIARRHEA: 0
VOMITING: 0
ABDOMINAL PAIN: 0
SORE THROAT: 0
SHORTNESS OF BREATH: 0
NAUSEA: 0

## 2023-10-27 NOTE — ED TRIAGE NOTES
Patient presents to ED with c/o concern for covid and has been in contact with someone but denies any symptoms

## 2023-10-28 LAB
FLUAV RNA SPEC QL NAA+PROBE: NOT DETECTED
FLUBV RNA SPEC QL NAA+PROBE: NOT DETECTED
SARS-COV-2 RNA RESP QL NAA+PROBE: NOT DETECTED

## 2023-10-28 NOTE — ED PROVIDER NOTES
Use: Never     Passive Exposure: Not on file   Alcohol Use: Not on file   Financial Resource Strain: Not on file   Food Insecurity: Not on file   Transportation Needs: Not on file   Physical Activity: Not on file   Stress: Not on file   Social Connections: Not on file   Intimate Partner Violence: Not on file   Depression: Not on file   Housing Stability: Not on file     Social History     Tobacco Use    Smoking status: Former     Packs/day: .5     Types: Cigarettes     Quit date: 2019     Years since quittin.6    Smokeless tobacco: Never   Substance Use Topics    Alcohol use: No    Drug use: No       ED Course: Progress Notes, Reevaluation, and Consults:  Initial assessment performed. I discussed presenting problems and concerns, and my formulated plan for today's visit with the patient and any available family members. I have encouraged them to ask questions as they arise throughout the visit. ED Physician Orders:   Orders Placed This Encounter   Procedures    COVID-19 & Influenza Combo     ED Medications Administered:   Medications - No data to display  Pt received IV/IM medications per above and placed on appropriate cardiac/respiratory monitoring due to drug toxicity. ED Physician Interpretation of Test Results:   All results were independently reviewed and interpreted by myself, notably showing:     RADIOLOGY:  Non-plain film images such as CT, ultrasound and MRI are read by the radiologist. Plain radiographic images are visualized and preliminarily interpreted by the ED Provider with the below findings:     Imaging independently interpreted by me:     Interpretation per the Radiologist below, if available at the time of this note:  No orders to display     My interpretation of laboratory results:   See below in ED course    Amount and/or Complexity of Data Reviewed  HIGH complexity decision making performed   Presentation: ACUTE and SEVERE  Clinical lab tests: ordered as appropriate &

## 2024-03-05 ENCOUNTER — TELEPHONE (OUTPATIENT)
Age: 69
End: 2024-03-05

## 2024-03-05 NOTE — TELEPHONE ENCOUNTER
Patient is requesting a call to possibly set up a F/U appt. States she is having some concerns and would like to have a f/u with  if possible.    Please contact.

## 2024-03-06 ENCOUNTER — TELEPHONE (OUTPATIENT)
Age: 69
End: 2024-03-06

## 2024-03-06 NOTE — TELEPHONE ENCOUNTER
Referral received from PCP to be seen for Dementia. I left a message to call us back if she wanted to schedule a virtual f/u with Ingris Moreno since she's already established with her.

## 2024-03-13 NOTE — TELEPHONE ENCOUNTER
Left a second message to call us back if she'd like to schedule a follow up with Ingris Moreno for Dementia.

## 2024-03-14 DIAGNOSIS — G40.019 LOCALIZATION-RELATED (FOCAL) (PARTIAL) IDIOPATHIC EPILEPSY AND EPILEPTIC SYNDROMES WITH SEIZURES OF LOCALIZED ONSET, INTRACTABLE, WITHOUT STATUS EPILEPTICUS (HCC): ICD-10-CM

## 2024-03-14 RX ORDER — LEVETIRACETAM 1000 MG/1
TABLET ORAL
Qty: 540 TABLET | Refills: 1 | Status: SHIPPED | OUTPATIENT
Start: 2024-03-14

## 2024-03-26 ENCOUNTER — TELEMEDICINE (OUTPATIENT)
Age: 69
End: 2024-03-26
Payer: MEDICARE

## 2024-03-26 DIAGNOSIS — F43.21 ADJUSTMENT DISORDER WITH DEPRESSED MOOD: ICD-10-CM

## 2024-03-26 DIAGNOSIS — G31.84 MILD COGNITIVE IMPAIRMENT WITH MEMORY LOSS: ICD-10-CM

## 2024-03-26 DIAGNOSIS — F41.9 MODERATE ANXIETY: ICD-10-CM

## 2024-03-26 DIAGNOSIS — G40.019 LOCALIZATION-RELATED (FOCAL) (PARTIAL) IDIOPATHIC EPILEPSY AND EPILEPTIC SYNDROMES WITH SEIZURES OF LOCALIZED ONSET, INTRACTABLE, WITHOUT STATUS EPILEPTICUS (HCC): Primary | ICD-10-CM

## 2024-03-26 PROCEDURE — 1123F ACP DISCUSS/DSCN MKR DOCD: CPT | Performed by: NURSE PRACTITIONER

## 2024-03-26 PROCEDURE — G8427 DOCREV CUR MEDS BY ELIG CLIN: HCPCS | Performed by: NURSE PRACTITIONER

## 2024-03-26 PROCEDURE — 3017F COLORECTAL CA SCREEN DOC REV: CPT | Performed by: NURSE PRACTITIONER

## 2024-03-26 PROCEDURE — 99214 OFFICE O/P EST MOD 30 MIN: CPT | Performed by: NURSE PRACTITIONER

## 2024-03-26 PROCEDURE — 1090F PRES/ABSN URINE INCON ASSESS: CPT | Performed by: NURSE PRACTITIONER

## 2024-03-26 PROCEDURE — G8399 PT W/DXA RESULTS DOCUMENT: HCPCS | Performed by: NURSE PRACTITIONER

## 2024-03-26 RX ORDER — LEVETIRACETAM 1000 MG/1
TABLET ORAL
Qty: 540 TABLET | Refills: 1 | Status: SHIPPED | OUTPATIENT
Start: 2024-03-26

## 2024-03-26 RX ORDER — LACOSAMIDE 150 MG/1
150 TABLET ORAL 2 TIMES DAILY
Qty: 180 TABLET | Refills: 3 | Status: SHIPPED | OUTPATIENT
Start: 2024-03-26 | End: 2025-03-21

## 2024-03-26 NOTE — PROGRESS NOTES
guardian's) consent. Patient identification was verified, and a caregiver was present when appropriate.   The patient was located at Home: [address] [facility] [address]  Provider was located at Home (Appt [place]  {STOP! Confirm you are appropriately licensed, registered, or certified to deliver care in the state where the patient is located as indicated above. If you are not or unsure, please re-schedule the visit. YES    DARNELL Hansen NP

## 2024-04-03 ENCOUNTER — HOSPITAL ENCOUNTER (OUTPATIENT)
Facility: HOSPITAL | Age: 69
Discharge: HOME | End: 2024-04-06
Payer: MEDICARE

## 2024-04-03 DIAGNOSIS — G31.84 MILD COGNITIVE IMPAIRMENT WITH MEMORY LOSS: ICD-10-CM

## 2024-04-03 PROCEDURE — 6360000004 HC RX CONTRAST MEDICATION: Performed by: NURSE PRACTITIONER

## 2024-04-03 PROCEDURE — A9579 GAD-BASE MR CONTRAST NOS,1ML: HCPCS | Performed by: NURSE PRACTITIONER

## 2024-04-03 PROCEDURE — 70553 MRI BRAIN STEM W/O & W/DYE: CPT

## 2024-04-03 RX ADMIN — GADOTERIDOL 18 ML: 279.3 INJECTION, SOLUTION INTRAVENOUS at 07:28

## 2024-04-17 DIAGNOSIS — F41.9 MODERATE ANXIETY: ICD-10-CM

## 2024-04-17 DIAGNOSIS — F43.21 ADJUSTMENT DISORDER WITH DEPRESSED MOOD: ICD-10-CM

## 2024-04-19 DIAGNOSIS — J44.9 CHRONIC OBSTRUCTIVE PULMONARY DISEASE, UNSPECIFIED (HCC): ICD-10-CM

## 2024-04-21 DIAGNOSIS — F43.21 ADJUSTMENT DISORDER WITH DEPRESSED MOOD: ICD-10-CM

## 2024-04-21 DIAGNOSIS — F41.9 MODERATE ANXIETY: ICD-10-CM

## 2024-04-21 DIAGNOSIS — G40.019 LOCALIZATION-RELATED (FOCAL) (PARTIAL) IDIOPATHIC EPILEPSY AND EPILEPTIC SYNDROMES WITH SEIZURES OF LOCALIZED ONSET, INTRACTABLE, WITHOUT STATUS EPILEPTICUS (HCC): ICD-10-CM

## 2024-04-22 RX ORDER — LACOSAMIDE 150 MG/1
150 TABLET ORAL 2 TIMES DAILY
Qty: 180 TABLET | Refills: 3 | Status: SHIPPED | OUTPATIENT
Start: 2024-04-22 | End: 2025-04-17

## 2024-04-22 RX ORDER — LEVETIRACETAM 1000 MG/1
TABLET ORAL
Qty: 540 TABLET | Refills: 1 | Status: SHIPPED | OUTPATIENT
Start: 2024-04-22

## 2024-04-24 RX ORDER — ALBUTEROL SULFATE 90 UG/1
AEROSOL, METERED RESPIRATORY (INHALATION)
Qty: 6.7 EACH | Refills: 3 | Status: SHIPPED | OUTPATIENT
Start: 2024-04-24

## 2024-05-21 RX ORDER — SODIUM CHLORIDE 0.9 % (FLUSH) 0.9 %
5-40 SYRINGE (ML) INJECTION EVERY 12 HOURS SCHEDULED
Status: CANCELLED | OUTPATIENT
Start: 2024-05-21

## 2024-05-21 RX ORDER — SODIUM CHLORIDE 0.9 % (FLUSH) 0.9 %
5-40 SYRINGE (ML) INJECTION PRN
Status: CANCELLED | OUTPATIENT
Start: 2024-05-21

## 2024-05-21 RX ORDER — SODIUM CHLORIDE 9 MG/ML
25 INJECTION, SOLUTION INTRAVENOUS PRN
Status: CANCELLED | OUTPATIENT
Start: 2024-05-21

## 2024-05-22 ENCOUNTER — ANESTHESIA EVENT (OUTPATIENT)
Facility: HOSPITAL | Age: 69
End: 2024-05-22
Payer: MEDICARE

## 2024-05-22 ENCOUNTER — HOSPITAL ENCOUNTER (OUTPATIENT)
Facility: HOSPITAL | Age: 69
Setting detail: OUTPATIENT SURGERY
Discharge: HOME | End: 2024-05-22
Attending: INTERNAL MEDICINE | Admitting: INTERNAL MEDICINE
Payer: MEDICARE

## 2024-05-22 ENCOUNTER — ANESTHESIA (OUTPATIENT)
Facility: HOSPITAL | Age: 69
End: 2024-05-22
Payer: MEDICARE

## 2024-05-22 VITALS
RESPIRATION RATE: 18 BRPM | WEIGHT: 193 LBS | DIASTOLIC BLOOD PRESSURE: 88 MMHG | HEIGHT: 63 IN | OXYGEN SATURATION: 84 % | HEART RATE: 75 BPM | SYSTOLIC BLOOD PRESSURE: 128 MMHG | TEMPERATURE: 97 F | BODY MASS INDEX: 34.2 KG/M2

## 2024-05-22 PROCEDURE — 3600007502: Performed by: INTERNAL MEDICINE

## 2024-05-22 PROCEDURE — 6360000002 HC RX W HCPCS

## 2024-05-22 PROCEDURE — 2500000003 HC RX 250 WO HCPCS

## 2024-05-22 PROCEDURE — 2709999900 HC NON-CHARGEABLE SUPPLY: Performed by: INTERNAL MEDICINE

## 2024-05-22 PROCEDURE — 3600007512: Performed by: INTERNAL MEDICINE

## 2024-05-22 PROCEDURE — 7100000010 HC PHASE II RECOVERY - FIRST 15 MIN: Performed by: INTERNAL MEDICINE

## 2024-05-22 PROCEDURE — 2580000003 HC RX 258

## 2024-05-22 PROCEDURE — 88305 TISSUE EXAM BY PATHOLOGIST: CPT

## 2024-05-22 PROCEDURE — 3700000001 HC ADD 15 MINUTES (ANESTHESIA): Performed by: INTERNAL MEDICINE

## 2024-05-22 PROCEDURE — 3700000000 HC ANESTHESIA ATTENDED CARE: Performed by: INTERNAL MEDICINE

## 2024-05-22 PROCEDURE — 7100000011 HC PHASE II RECOVERY - ADDTL 15 MIN: Performed by: INTERNAL MEDICINE

## 2024-05-22 PROCEDURE — 2580000003 HC RX 258: Performed by: INTERNAL MEDICINE

## 2024-05-22 RX ORDER — SODIUM CHLORIDE 9 MG/ML
INJECTION, SOLUTION INTRAVENOUS CONTINUOUS PRN
Status: COMPLETED | OUTPATIENT
Start: 2024-05-22 | End: 2024-05-22

## 2024-05-22 RX ORDER — 0.9 % SODIUM CHLORIDE 0.9 %
INTRAVENOUS SOLUTION INTRAVENOUS PRN
Status: DISCONTINUED | OUTPATIENT
Start: 2024-05-22 | End: 2024-05-22 | Stop reason: SDUPTHER

## 2024-05-22 RX ORDER — LIDOCAINE HYDROCHLORIDE 20 MG/ML
INJECTION, SOLUTION EPIDURAL; INFILTRATION; INTRACAUDAL; PERINEURAL PRN
Status: DISCONTINUED | OUTPATIENT
Start: 2024-05-22 | End: 2024-05-22 | Stop reason: SDUPTHER

## 2024-05-22 RX ADMIN — SODIUM CHLORIDE 300 ML: 900 INJECTION, SOLUTION INTRAVENOUS at 09:42

## 2024-05-22 RX ADMIN — PROPOFOL 220 MG: 10 INJECTION, EMULSION INTRAVENOUS at 09:42

## 2024-05-22 RX ADMIN — LIDOCAINE HYDROCHLORIDE 50 MG: 20 INJECTION, SOLUTION EPIDURAL; INFILTRATION; INTRACAUDAL; PERINEURAL at 09:22

## 2024-05-22 ASSESSMENT — PAIN - FUNCTIONAL ASSESSMENT: PAIN_FUNCTIONAL_ASSESSMENT: 0-10

## 2024-05-22 NOTE — H&P
by mouth daily   atorvastatin (LIPITOR) 10 MG tablet Not Taking  Yes No   Sig: Take by mouth daily   Patient not taking: Reported on 2024   calcium carbonate (OS-JAMILA) 1250 (500 Ca) MG chewable tablet 2024  Yes No   Sig: Take 1 tablet by mouth 2 times daily   Patient not taking: Reported on 2024   denosumab (PROLIA) 60 MG/ML SOSY SC injection Past Month  Yes No   Sig: Inject 1 mL into the skin   fluticasone (FLONASE) 50 MCG/ACT nasal spray 2024  Yes No   Si spray by Nasal route as needed   hydroCHLOROthiazide (HYDRODIURIL) 12.5 MG tablet 2024  Yes No   Sig: Take 1 tablet by mouth daily   ibuprofen (ADVIL;MOTRIN) 200 MG CAPS capsule   Yes No   Sig: Take by mouth   lacosamide (VIMPAT) 150 MG TABS tablet 2024  No No   Sig: Take 1 tablet by mouth 2 times daily for 360 days. Max Daily Amount: 300 mg   levETIRAcetam (KEPPRA) 1000 MG tablet 2024  No No   Sig: TAKE 3 TABLETS BY MOUTH TWICE A DAY   loratadine-pseudoephedrine (CLARITIN-D 24 HOUR)  MG per extended release tablet 2024  Yes No   Sig: Take 1 tablet by mouth daily   montelukast (SINGULAIR) 10 MG tablet 2024  Yes No   Sig: Take 1 tablet by mouth nightly   olmesartan (BENICAR) 5 MG tablet 2024  Yes No   Sig: Take 1 tablet by mouth daily   sertraline (ZOLOFT) 50 MG tablet Not Taking  No No   Sig: Take 1 tablet by mouth daily   Patient not taking: Reported on 2024      Facility-Administered Medications: None           The review of systems is:  Negative  for shortness of breath or chest pain      The heart, lungs, and mental status were satisfactory for the administration of deep sedation and for the procedure.      I discussed with the patient the objectives, risks, consequences and alternatives to the procedure.      Dalton Hendricks MD  2024  11:56 AM

## 2024-05-22 NOTE — DISCHARGE INSTRUCTIONS
[place] Office: [phone]    Mandi Whittaker  [id number]  1955    EGD/COLONOSCOPY DISCHARGE INSTRUCTIONS  Discomfort:  Sore throat- throat lozenges or warm salt water gargle  redness at IV site- apply warm compress to area; if redness or soreness persist- contact your physician  Gaseous discomfort- walking, belching will help relieve any discomfort  You may not operate a vehicle for 12 hours  You may not engage in an occupation involving machinery or appliances for rest of today.  You may not drink alcoholic beverages for at least 12 hours  Avoid making any critical decisions for at least 24 hour  DIET  You may resume your regular diet - however -  remember your colon is empty and a heavy meal will produce gas.   Avoid these foods:  fried / greasy foods, excessive carbonated drinks or too much caffeine  MEDICATIONS   Regarding Aspirin or Nonsteroidal medications specifically, please see below.  ACTIVITY  You may resume your normal daily activities.   Spend the remainder of the day resting -  avoid any strenuous activity.    CALL M.D.  ANY SIGN OF   Increasing pain, nausea, vomiting  Abdominal distension (swelling)  New increased bleeding (oral or rectal)  Fever (chills)  Pain in chest area  Bloody discharge from nose or mouth  Shortness of breath    You may not take any Advil, Aspirin, Ibuprofen, Motrin or Aleve(NSAIDs) for 7 days, ONLY  Tylenol as needed for pain.    Findings:  The Olympus video high-definition colonoscope was advanced to the cecum, identified by its typical land marks, with ease.  A sessile 3 mm transverse colon polyp was removed with cold biopsy forceps.  Moderate sigmoid and descending colon diverticulosis. Minimal ashwini-diverticular distal sigmoid erythema noted.  Medium-sized internal hemorrhoids  Fair prep noted.      Follow-up Instructions:   Call  Kartik Hendricks MD for any questions or concerns  Results of procedure / biopsy in 7 days   Telephone #

## 2024-05-22 NOTE — ANESTHESIA POSTPROCEDURE EVALUATION
Department of Anesthesiology  Postprocedure Note    Patient: Mandi Whittaker  MRN: [id number]  YOB: 1955  Date of evaluation: 5/22/2024    Procedure Summary       Date: 05/22/24 Room / Location: \[facility]\"" ENDO 03 / [facility] ENDOSCOPY    Anesthesia Start: 0922 Anesthesia Stop: 0946    Procedure: COLONOSCOPY (Lower GI Region) Diagnosis:       Personal history of colonic polyps      FH: colonic polyps      (Personal history of colonic polyps [Z86.010])      (FH: colonic polyps [Z83.719])    Surgeons: Dalton Hendricks MD Responsible Provider: Jermain Jim MD    Anesthesia Type: MAC ASA Status: 2            Anesthesia Type: No value filed.    Afua Phase I: Afua Score: 10    Afua Phase II: Afua Score: 10    Anesthesia Post Evaluation    Patient location during evaluation: PACU  Patient participation: complete - patient participated  Level of consciousness: awake and alert  Airway patency: patent  Nausea & Vomiting: no nausea  Cardiovascular status: hemodynamically stable  Respiratory status: acceptable  Hydration status: euvolemic    No notable events documented.

## 2024-05-22 NOTE — OP NOTE
Colonoscopy Procedure Note    Mandi Whittaker  1955  [id number]    Indications:  Please see below.    Pre-operative Diagnosis: Personal history of colonic polyps [Z86.010]  FH: colonic polyps [Z83.719]    Post-operative Diagnosis: polyp transverse colon,diverticulosis,internal hemorrhoids      : Kartik Hendricks MD    Referring Provider: Aniket Almanzar Jr., MD    Sedation:  MAC anesthesia Propofol        Procedure Details:    After detailed informed consent was obtained with all risks and benefits of procedure explained and preoperative exam completed, the patient was taken to the endoscopy suite and placed in the left lateral decubitus position.  Upon sequential sedation as per above, a digital rectal exam was performed  and was normal.  The Olympus videocolonoscope  was inserted in the rectum and carefully advanced to the cecum, which was identified by the ileocecal valve and appendiceal orifice.   The colonoscope was slowly withdrawn with careful evaluation between folds.   Retroflexion in the rectum was performed.      The quality of preparation was fair    [place] Bowel Preparation Score: 2/2/2      Findings:   The Olympus video high-definition colonoscope was advanced to the cecum, identified by its typical land marks, with ease.  A sessile 3 mm transverse colon polyp was removed with cold biopsy forceps.  Moderate sigmoid and descending colon diverticulosis. Minimal ashwini-diverticular distal sigmoid erythema noted.  Medium-sized internal hemorrhoids  Fair prep noted.      Therapies:  1 complete polypectomy performed using cold biopsy forceps and the polyp was  retrieved    Specimen/s:      Specimens were collected as described above and sent to pathology.     Complications: None were encountered during the procedure.     EBL:  None.    Recommendations:     -Await pathology.  -Repeat colonoscopy in 3 years.    -For new bleeding, unexplained weight loss, change in bowel habits and/or anemia, an

## 2024-05-22 NOTE — ANESTHESIA PRE PROCEDURE
Results   Component Value Date/Time    COVID19 Not detected 10/27/2023 08:55 PM           Anesthesia Evaluation  Patient summary reviewed and Nursing notes reviewed   no history of anesthetic complications:   Airway: Mallampati: II  TM distance: >3 FB   Neck ROM: full  Mouth opening: > = 3 FB   Dental: normal exam         Pulmonary:normal exam    (+)     sleep apnea:       asthma:                            Cardiovascular:    (+) hypertension:, hyperlipidemia                  Neuro/Psych:   (+) seizures: well controlled             ROS comment: Fibromyalgia GI/Hepatic/Renal:   (+) GERD:          Endo/Other: Negative Endo/Other ROS                    Abdominal:             Vascular:          Other Findings: Abdominal exam deferred            Anesthesia Plan      MAC     ASA 2       Induction: intravenous.      Anesthetic plan and risks discussed with patient.      Plan discussed with CRNA.    Attending anesthesiologist reviewed and agrees with Preprocedure content                Jermain Jim MD   5/22/2024

## 2024-06-30 DIAGNOSIS — F41.9 MODERATE ANXIETY: ICD-10-CM

## 2024-06-30 DIAGNOSIS — F43.21 ADJUSTMENT DISORDER WITH DEPRESSED MOOD: ICD-10-CM

## 2024-08-01 ENCOUNTER — HOSPITAL ENCOUNTER (OUTPATIENT)
Facility: HOSPITAL | Age: 69
Discharge: HOME | End: 2024-08-01
Attending: FAMILY MEDICINE
Payer: MEDICARE

## 2024-08-01 VITALS — BODY MASS INDEX: 34.2 KG/M2 | HEIGHT: 63 IN | WEIGHT: 193 LBS

## 2024-08-01 DIAGNOSIS — Z12.31 SCREENING MAMMOGRAM FOR BREAST CANCER: ICD-10-CM

## 2024-08-01 PROCEDURE — 77063 BREAST TOMOSYNTHESIS BI: CPT

## 2024-08-06 ENCOUNTER — HOSPITAL ENCOUNTER (EMERGENCY)
Facility: HOSPITAL | Age: 69
Discharge: HOME | End: 2024-08-06
Attending: STUDENT IN AN ORGANIZED HEALTH CARE EDUCATION/TRAINING PROGRAM
Payer: MEDICARE

## 2024-08-06 VITALS
RESPIRATION RATE: 16 BRPM | TEMPERATURE: 98.4 F | DIASTOLIC BLOOD PRESSURE: 90 MMHG | OXYGEN SATURATION: 95 % | HEART RATE: 81 BPM | SYSTOLIC BLOOD PRESSURE: 143 MMHG

## 2024-08-06 DIAGNOSIS — S61.214A LACERATION OF RIGHT RING FINGER WITHOUT FOREIGN BODY WITHOUT DAMAGE TO NAIL, INITIAL ENCOUNTER: Primary | ICD-10-CM

## 2024-08-06 PROCEDURE — 90714 TD VACC NO PRESV 7 YRS+ IM: CPT

## 2024-08-06 PROCEDURE — 99284 EMERGENCY DEPT VISIT MOD MDM: CPT

## 2024-08-06 PROCEDURE — 90471 IMMUNIZATION ADMIN: CPT

## 2024-08-06 PROCEDURE — 6360000002 HC RX W HCPCS

## 2024-08-06 PROCEDURE — 2500000003 HC RX 250 WO HCPCS

## 2024-08-06 PROCEDURE — 12001 RPR S/N/AX/GEN/TRNK 2.5CM/<: CPT

## 2024-08-06 RX ORDER — LIDOCAINE HYDROCHLORIDE 10 MG/ML
5 INJECTION, SOLUTION EPIDURAL; INFILTRATION; INTRACAUDAL; PERINEURAL
Status: COMPLETED | OUTPATIENT
Start: 2024-08-06 | End: 2024-08-06

## 2024-08-06 RX ADMIN — CLOSTRIDIUM TETANI TOXOID ANTIGEN (FORMALDEHYDE INACTIVATED) AND CORYNEBACTERIUM DIPHTHERIAE TOXOID ANTIGEN (FORMALDEHYDE INACTIVATED) 0.5 ML: 5; 2 INJECTION, SUSPENSION INTRAMUSCULAR at 14:25

## 2024-08-06 RX ADMIN — LIDOCAINE HYDROCHLORIDE 5 ML: 10 INJECTION, SOLUTION EPIDURAL; INFILTRATION; INTRACAUDAL; PERINEURAL at 14:25

## 2024-08-06 ASSESSMENT — PAIN SCALES - GENERAL: PAINLEVEL_OUTOF10: 8

## 2024-08-06 ASSESSMENT — PAIN DESCRIPTION - LOCATION: LOCATION: FINGER (COMMENT WHICH ONE)

## 2024-08-06 ASSESSMENT — PAIN - FUNCTIONAL ASSESSMENT: PAIN_FUNCTIONAL_ASSESSMENT: 0-10

## 2024-08-06 ASSESSMENT — PAIN DESCRIPTION - DESCRIPTORS: DESCRIPTORS: ACHING

## 2024-08-06 ASSESSMENT — LIFESTYLE VARIABLES
HOW MANY STANDARD DRINKS CONTAINING ALCOHOL DO YOU HAVE ON A TYPICAL DAY: 1 OR 2
HOW OFTEN DO YOU HAVE A DRINK CONTAINING ALCOHOL: MONTHLY OR LESS

## 2024-08-06 ASSESSMENT — PAIN DESCRIPTION - ORIENTATION: ORIENTATION: RIGHT

## 2024-08-06 ASSESSMENT — PAIN DESCRIPTION - PAIN TYPE: TYPE: ACUTE PAIN

## 2024-08-06 NOTE — ED PROVIDER NOTES
dailyHistorical Med      ibuprofen (ADVIL;MOTRIN) 200 MG CAPS capsule Take by mouthHistorical Med      loratadine-pseudoephedrine (CLARITIN-D 24 HOUR)  MG per extended release tablet Take 1 tablet by mouth dailyHistorical Med      montelukast (SINGULAIR) 10 MG tablet Take 1 tablet by mouth nightlyHistorical Med      olmesartan (BENICAR) 5 MG tablet Take 1 tablet by mouth dailyHistorical Med             SCREENINGS               No data recorded         PHYSICAL EXAM      ED Triage Vitals [08/06/24 1335]   Enc Vitals Group      BP (!) 143/90      Pulse 81      Respirations 16      Temp 98.4 °F (36.9 °C)      Temp src       SpO2 95 %      Weight       Height       Head Circumference       Peak Flow       Pain Score       Pain Loc       Pain Edu?       Excl. in GC?         Physical Exam  Vitals and nursing note reviewed.   Constitutional:       General: She is not in acute distress.     Appearance: Normal appearance. She is normal weight.   Eyes:      Conjunctiva/sclera: Conjunctivae normal.   Pulmonary:      Effort: Pulmonary effort is normal. No respiratory distress.   Musculoskeletal:      Cervical back: Normal range of motion.   Skin:     General: Skin is warm and dry.      Capillary Refill: Capillary refill takes less than 2 seconds.      Comments: Laceration to the right ring finger at the junction of the distal and middle phalanx.  Hemostatic on initial evaluation.  Patient able to move fingers through full range of motion without difficulty.  Neurovascularly intact distal   Neurological:      Mental Status: She is alert and oriented to person, place, and time.          DIAGNOSTIC RESULTS   LABS:     No results found for this or any previous visit (from the past 24 hour(s)).    EKG: If performed, independent interpretation documented below in the MDM section     RADIOLOGY:  Non-plain film images such as CT, Ultrasound and MRI are read by the radiologist. Plain radiographic images are visualized and

## 2024-09-26 ENCOUNTER — TRANSCRIBE ORDERS (OUTPATIENT)
Facility: HOSPITAL | Age: 69
End: 2024-09-26

## 2024-09-26 DIAGNOSIS — F17.211 CIGARETTE NICOTINE DEPENDENCE IN REMISSION: Primary | ICD-10-CM

## 2024-09-26 DIAGNOSIS — F17.201 TOBACCO DEPENDENCE IN REMISSION: Primary | ICD-10-CM

## 2024-10-07 ENCOUNTER — HOSPITAL ENCOUNTER (OUTPATIENT)
Facility: HOSPITAL | Age: 69
Discharge: HOME | End: 2024-10-10
Attending: FAMILY MEDICINE
Payer: MEDICARE

## 2024-10-07 DIAGNOSIS — F17.211 CIGARETTE NICOTINE DEPENDENCE IN REMISSION: ICD-10-CM

## 2024-10-07 PROCEDURE — 71271 CT THORAX LUNG CANCER SCR C-: CPT

## 2024-10-26 DIAGNOSIS — G40.019 LOCALIZATION-RELATED (FOCAL) (PARTIAL) IDIOPATHIC EPILEPSY AND EPILEPTIC SYNDROMES WITH SEIZURES OF LOCALIZED ONSET, INTRACTABLE, WITHOUT STATUS EPILEPTICUS (HCC): ICD-10-CM

## 2024-10-28 RX ORDER — LACOSAMIDE 150 MG/1
150 TABLET ORAL 2 TIMES DAILY
Qty: 180 TABLET | Refills: 2 | Status: SHIPPED | OUTPATIENT
Start: 2024-10-28 | End: 2025-10-23

## 2025-02-03 DIAGNOSIS — G40.019 LOCALIZATION-RELATED (FOCAL) (PARTIAL) IDIOPATHIC EPILEPSY AND EPILEPTIC SYNDROMES WITH SEIZURES OF LOCALIZED ONSET, INTRACTABLE, WITHOUT STATUS EPILEPTICUS (HCC): ICD-10-CM

## 2025-03-12 RX ORDER — LEVETIRACETAM 1000 MG/1
TABLET ORAL
Qty: 540 TABLET | Refills: 1 | OUTPATIENT
Start: 2025-03-12

## 2025-03-28 ENCOUNTER — HOSPITAL ENCOUNTER (EMERGENCY)
Facility: HOSPITAL | Age: 70
Discharge: HOME | End: 2025-03-28
Attending: EMERGENCY MEDICINE
Payer: MEDICARE

## 2025-03-28 VITALS
DIASTOLIC BLOOD PRESSURE: 92 MMHG | OXYGEN SATURATION: 100 % | SYSTOLIC BLOOD PRESSURE: 138 MMHG | BODY MASS INDEX: 34.2 KG/M2 | RESPIRATION RATE: 17 BRPM | TEMPERATURE: 97.9 F | HEIGHT: 63 IN | WEIGHT: 193 LBS | HEART RATE: 89 BPM

## 2025-03-28 DIAGNOSIS — M51.360 DEGENERATION OF INTERVERTEBRAL DISC OF LUMBAR REGION WITH DISCOGENIC BACK PAIN: ICD-10-CM

## 2025-03-28 DIAGNOSIS — M54.32 SCIATICA OF LEFT SIDE: Primary | ICD-10-CM

## 2025-03-28 PROCEDURE — 6370000000 HC RX 637 (ALT 250 FOR IP): Performed by: EMERGENCY MEDICINE

## 2025-03-28 PROCEDURE — 6360000002 HC RX W HCPCS: Performed by: EMERGENCY MEDICINE

## 2025-03-28 PROCEDURE — 96372 THER/PROPH/DIAG INJ SC/IM: CPT

## 2025-03-28 PROCEDURE — 99284 EMERGENCY DEPT VISIT MOD MDM: CPT

## 2025-03-28 RX ORDER — LIDOCAINE 50 MG/G
1 PATCH TOPICAL DAILY
Qty: 10 PATCH | Refills: 0 | Status: SHIPPED | OUTPATIENT
Start: 2025-03-28 | End: 2025-04-06

## 2025-03-28 RX ORDER — NAPROXEN 500 MG/1
500 TABLET ORAL 2 TIMES DAILY
Qty: 60 TABLET | Refills: 0 | Status: SHIPPED | OUTPATIENT
Start: 2025-03-28

## 2025-03-28 RX ORDER — KETOROLAC TROMETHAMINE 30 MG/ML
30 INJECTION, SOLUTION INTRAMUSCULAR; INTRAVENOUS ONCE
Status: COMPLETED | OUTPATIENT
Start: 2025-03-28 | End: 2025-03-28

## 2025-03-28 RX ORDER — LIDOCAINE 4 G/G
1 PATCH TOPICAL ONCE
Status: DISCONTINUED | OUTPATIENT
Start: 2025-03-28 | End: 2025-03-28 | Stop reason: HOSPADM

## 2025-03-28 RX ORDER — METHYLPREDNISOLONE 4 MG/1
TABLET ORAL
Qty: 21 TABLET | Refills: 0 | Status: SHIPPED | OUTPATIENT
Start: 2025-03-28 | End: 2025-04-03

## 2025-03-28 RX ADMIN — KETOROLAC TROMETHAMINE 30 MG: 30 INJECTION, SOLUTION INTRAMUSCULAR at 09:52

## 2025-03-28 ASSESSMENT — PAIN DESCRIPTION - DESCRIPTORS: DESCRIPTORS: ACHING

## 2025-03-28 ASSESSMENT — PAIN - FUNCTIONAL ASSESSMENT
PAIN_FUNCTIONAL_ASSESSMENT: PREVENTS OR INTERFERES SOME ACTIVE ACTIVITIES AND ADLS
PAIN_FUNCTIONAL_ASSESSMENT: 0-10

## 2025-03-28 ASSESSMENT — PAIN DESCRIPTION - ORIENTATION: ORIENTATION: LEFT

## 2025-03-28 ASSESSMENT — LIFESTYLE VARIABLES
HOW MANY STANDARD DRINKS CONTAINING ALCOHOL DO YOU HAVE ON A TYPICAL DAY: PATIENT DOES NOT DRINK
HOW OFTEN DO YOU HAVE A DRINK CONTAINING ALCOHOL: NEVER

## 2025-03-28 ASSESSMENT — PAIN DESCRIPTION - PAIN TYPE: TYPE: ACUTE PAIN

## 2025-03-28 ASSESSMENT — PAIN DESCRIPTION - LOCATION: LOCATION: HIP

## 2025-03-28 ASSESSMENT — PAIN DESCRIPTION - ONSET: ONSET: ON-GOING

## 2025-03-28 ASSESSMENT — PAIN DESCRIPTION - FREQUENCY: FREQUENCY: INTERMITTENT

## 2025-03-28 ASSESSMENT — PAIN SCALES - GENERAL: PAINLEVEL_OUTOF10: 8

## 2025-03-28 NOTE — ED PROVIDER NOTES
[facility] EMERGENCY DEPARTMENT  EMERGENCY DEPARTMENT ENCOUNTER    Patient Name: Mandi Whittaker  MRN: [id number]  YOB: 1955  Provider: Bandar Robledo MD  PCP: Aniket Almanzar Jr., MD  Time/Date of evaluation: 9:43 AM EDT on 3/28/25    History of Presenting Illness     Chief Complaint   Patient presents with    Hip Pain     Pt ambulatory into TR. Pt c/o L lower back/hip pain x \"months\" pt denies injury or known hx of sciatica. Pt denies dysuria. Pt denies numbness/tingling. Pt denies pain radiating down L leg. Pt denies incontinence.      History Provided by: Patient   History is limited by: Nothing    HISTORY (Narrative):   Mandi Whittaker is a 69 y.o. female with a PMHX of osteoarthritis, asthma, fibromyalgia, hypertension, and seizures  who presents to the emergency department (room Triage 1) by POV C/O left low back pain that radiates to the left buttock for the past 3 to 4 months.  Patient denies any falls or injuries.  She has tried taking ibuprofen intermittently for the pain with minimal improvement.  She denies any bowel/bladder incontinence, saddle anesthesia, or lower extremity weakness.    Nursing Notes were all reviewed and agreed with or any disagreements were addressed in the HPI.    Past History     PAST MEDICAL HISTORY:  Past Medical History:   Diagnosis Date    Arthritis     Asthma     Cervical cancer (HCC)     Colon cancer (HCC)     February 2016 - pt denies    Fibromyalgia     GERD (gastroesophageal reflux disease)     Hypertension     Osteoporosis     Seizures (HCC)     as of 5/21/24 LAST SEIZURE 2013    Ulcer        PAST SURGICAL HISTORY:  Past Surgical History:   Procedure Laterality Date    BREAST BIOPSY Right     negative surgical biopsy    COLONOSCOPY N/A 4/16/2019    COLONOSCOPY performed by Willie Trent MD at [facility] ENDOSCOPY    COLONOSCOPY  02/2016    COLONOSCOPY N/A 5/22/2024    COLONOSCOPY performed by Dalton Hendricks MD at [facility] ENDOSCOPY

## 2025-04-06 ENCOUNTER — HOSPITAL ENCOUNTER (EMERGENCY)
Facility: HOSPITAL | Age: 70
Discharge: HOME | End: 2025-04-06
Attending: EMERGENCY MEDICINE
Payer: MEDICARE

## 2025-04-06 ENCOUNTER — APPOINTMENT (OUTPATIENT)
Facility: HOSPITAL | Age: 70
End: 2025-04-06
Payer: MEDICARE

## 2025-04-06 VITALS
RESPIRATION RATE: 18 BRPM | BODY MASS INDEX: 34.38 KG/M2 | HEART RATE: 64 BPM | OXYGEN SATURATION: 91 % | WEIGHT: 194 LBS | DIASTOLIC BLOOD PRESSURE: 82 MMHG | HEIGHT: 63 IN | SYSTOLIC BLOOD PRESSURE: 146 MMHG | TEMPERATURE: 98 F

## 2025-04-06 DIAGNOSIS — M46.1 SACROILIAC INFLAMMATION: Primary | ICD-10-CM

## 2025-04-06 PROCEDURE — 2500000003 HC RX 250 WO HCPCS: Performed by: EMERGENCY MEDICINE

## 2025-04-06 PROCEDURE — 99284 EMERGENCY DEPT VISIT MOD MDM: CPT

## 2025-04-06 PROCEDURE — 6360000002 HC RX W HCPCS: Performed by: EMERGENCY MEDICINE

## 2025-04-06 PROCEDURE — 73502 X-RAY EXAM HIP UNI 2-3 VIEWS: CPT

## 2025-04-06 PROCEDURE — 96372 THER/PROPH/DIAG INJ SC/IM: CPT

## 2025-04-06 PROCEDURE — 6370000000 HC RX 637 (ALT 250 FOR IP): Performed by: EMERGENCY MEDICINE

## 2025-04-06 RX ORDER — ONDANSETRON 2 MG/ML
4 INJECTION INTRAMUSCULAR; INTRAVENOUS EVERY 4 HOURS PRN
Status: DISCONTINUED | OUTPATIENT
Start: 2025-04-06 | End: 2025-04-06

## 2025-04-06 RX ORDER — ACETAMINOPHEN 325 MG/1
650 TABLET ORAL EVERY 4 HOURS PRN
Status: DISCONTINUED | OUTPATIENT
Start: 2025-04-06 | End: 2025-04-06

## 2025-04-06 RX ORDER — LIDOCAINE 50 MG/G
1 PATCH TOPICAL DAILY
Qty: 10 PATCH | Refills: 0 | Status: SHIPPED | OUTPATIENT
Start: 2025-04-06 | End: 2025-04-16

## 2025-04-06 RX ORDER — HYDROMORPHONE HYDROCHLORIDE 1 MG/ML
1 INJECTION, SOLUTION INTRAMUSCULAR; INTRAVENOUS; SUBCUTANEOUS ONCE
Status: COMPLETED | OUTPATIENT
Start: 2025-04-06 | End: 2025-04-06

## 2025-04-06 RX ORDER — KETOROLAC TROMETHAMINE 30 MG/ML
30 INJECTION, SOLUTION INTRAMUSCULAR; INTRAVENOUS ONCE
Status: COMPLETED | OUTPATIENT
Start: 2025-04-06 | End: 2025-04-06

## 2025-04-06 RX ORDER — HYDROCODONE BITARTRATE AND ACETAMINOPHEN 5; 325 MG/1; MG/1
1 TABLET ORAL EVERY 4 HOURS PRN
Qty: 12 TABLET | Refills: 0 | Status: SHIPPED | OUTPATIENT
Start: 2025-04-06 | End: 2025-04-09

## 2025-04-06 RX ORDER — PREDNISONE 10 MG/1
TABLET ORAL
Qty: 42 TABLET | Refills: 0 | Status: SHIPPED | OUTPATIENT
Start: 2025-04-06

## 2025-04-06 RX ADMIN — KETOROLAC TROMETHAMINE 30 MG: 30 INJECTION, SOLUTION INTRAMUSCULAR at 18:29

## 2025-04-06 RX ADMIN — PREDNISONE 50 MG: 20 TABLET ORAL at 18:28

## 2025-04-06 RX ADMIN — LIDOCAINE HYDROCHLORIDE 40 ML: 20 SOLUTION ORAL at 18:28

## 2025-04-06 RX ADMIN — HYDROMORPHONE HYDROCHLORIDE 1 MG: 1 INJECTION, SOLUTION INTRAMUSCULAR; INTRAVENOUS; SUBCUTANEOUS at 18:28

## 2025-04-06 ASSESSMENT — PAIN DESCRIPTION - LOCATION: LOCATION: HIP

## 2025-04-06 ASSESSMENT — PAIN SCALES - GENERAL
PAINLEVEL_OUTOF10: 4
PAINLEVEL_OUTOF10: 8
PAINLEVEL_OUTOF10: 10
PAINLEVEL_OUTOF10: 10
PAINLEVEL_OUTOF10: 0

## 2025-04-06 ASSESSMENT — PAIN DESCRIPTION - ORIENTATION: ORIENTATION: RIGHT

## 2025-04-06 ASSESSMENT — PAIN DESCRIPTION - DESCRIPTORS: DESCRIPTORS: SHARP

## 2025-04-06 NOTE — ED PROVIDER NOTES
[facility] EMERGENCY DEPARTMENT  EMERGENCY DEPARTMENT ENCOUNTER       Pt Name: Mandi Whittaker  MRN: [id number]  Birthdate 1955  Date of evaluation: 4/6/2025  Provider: Deric Cerrato MD   PCP: Aniket Almanzar Jr., MD  Note Started: 6:35 PM EDT 4/6/25     CHIEF COMPLAINT       Chief Complaint   Patient presents with    Hip Pain     Pt reporting persistent L hip pain that is unchanged since she was diagnosed with sciatica and completed her steroid pack. Pt denies any injury, has been taking no medication for pain at home         HISTORY OF PRESENT ILLNESS: 1 or more elements      History From: Patient  HPI Limitations: None     Mandi Whittaker is a 69 y.o. female who presents with medical problems as stated below presenting with atraumatic lower back pain.  This pain has been progressive, seen about a week ago and diagnosed with sciatica.  She was put on prednisone, NSAIDs, with minimal improvement.  Also was prescribed Lidoderm patch but she has not used this much.  There is no radiation of pain to the lower extremities, no lower extremity weakness or numbness.  No incontinence.  No fevers or chills or any history of cancer or IV drug use.     Nursing Notes were all reviewed and agreed with or any disagreements were addressed in the HPI.     REVIEW OF SYSTEMS      Review of Systems     Positives and Pertinent negatives as per HPI.    PAST HISTORY     Past Medical History:  Past Medical History:   Diagnosis Date    Arthritis     Asthma     Cervical cancer (HCC)     Colon cancer (HCC)     February 2016 - pt denies    Fibromyalgia     GERD (gastroesophageal reflux disease)     Hypertension     Osteoporosis     Seizures (HCC)     as of 5/21/24 LAST SEIZURE 2013    Ulcer          Past Surgical History:  Past Surgical History:   Procedure Laterality Date    BREAST BIOPSY Right     negative surgical biopsy    COLONOSCOPY N/A 4/16/2019    COLONOSCOPY performed by Willie Trent MD at [facility] ENDOSCOPY

## 2025-04-06 NOTE — DISCHARGE INSTRUCTIONS
Thank you for choosing our Emergency Department for your care.  It is our privilege to care for you in your time of need.  In the next several days, you may receive a survey via email or mailed to your home about your experience with our team.  We would greatly appreciate you taking a few minutes to complete the survey, as we use this information to learn what we have done well and what we could be doing better. Thank you for trusting us with your care!    Below you will find a list of your tests from today's visit.   Labs and Radiology Studies  No results found for this or any previous visit (from the past 12 hours).  XR HIP 2-3 VW W PELVIS LEFT  Result Date: 4/6/2025  EXAM: XR HIP 2-3 VW W PELVIS LEFT INDICATION: pain. COMPARISON: None. FINDINGS: AP view of the pelvis and a frogleg lateral view of the left hip demonstrate mild diffuse osteopenia. No acute fracture or dislocation is shown. There is mild left hip osteoarthrosis. Incomplete coverage of the femoral heads by the acetabulum is shown bilaterally suggesting forme fruste acetabular dysplasia, more prominently shown in the left..     No acute fracture or dislocation. Forme fruste bilateral acetabular dysplasia and mild left hip osteoarthrosis. Electronically signed by Yamil Camarena MD    ------------------------------------------------------------------------------------------------------------  The evaluation and treatment you received in the Emergency Department were for an urgent problem. It is important that you follow-up with a doctor, nurse practitioner, or physician assistant to:  (1) confirm your diagnosis,  (2) re-evaluation of changes in your illness and treatment, and (3) for ongoing care. Please take your discharge instructions with you when you go to your follow-up appointment.     If you have any problem arranging a follow-up appointment, contact us!  If your symptoms become worse or you do not improve as expected, please return to us. We

## 2025-05-09 PROCEDURE — 93005 ELECTROCARDIOGRAM TRACING: CPT | Performed by: STUDENT IN AN ORGANIZED HEALTH CARE EDUCATION/TRAINING PROGRAM

## 2025-05-09 PROCEDURE — 36415 COLL VENOUS BLD VENIPUNCTURE: CPT

## 2025-05-09 PROCEDURE — 84484 ASSAY OF TROPONIN QUANT: CPT

## 2025-05-09 PROCEDURE — 83690 ASSAY OF LIPASE: CPT

## 2025-05-09 PROCEDURE — 85025 COMPLETE CBC W/AUTO DIFF WBC: CPT

## 2025-05-09 PROCEDURE — 99285 EMERGENCY DEPT VISIT HI MDM: CPT

## 2025-05-09 PROCEDURE — 80053 COMPREHEN METABOLIC PANEL: CPT

## 2025-05-09 ASSESSMENT — PAIN DESCRIPTION - ONSET: ONSET: PROGRESSIVE

## 2025-05-09 ASSESSMENT — PAIN DESCRIPTION - FREQUENCY: FREQUENCY: CONTINUOUS

## 2025-05-09 ASSESSMENT — PAIN - FUNCTIONAL ASSESSMENT
PAIN_FUNCTIONAL_ASSESSMENT: 0-10
PAIN_FUNCTIONAL_ASSESSMENT: ACTIVITIES ARE NOT PREVENTED

## 2025-05-09 ASSESSMENT — PAIN DESCRIPTION - PAIN TYPE: TYPE: ACUTE PAIN

## 2025-05-09 ASSESSMENT — PAIN DESCRIPTION - DESCRIPTORS: DESCRIPTORS: OTHER (COMMENT)

## 2025-05-09 ASSESSMENT — PAIN DESCRIPTION - ORIENTATION: ORIENTATION: UPPER

## 2025-05-09 ASSESSMENT — PAIN DESCRIPTION - LOCATION: LOCATION: ABDOMEN

## 2025-05-09 ASSESSMENT — PAIN SCALES - GENERAL: PAINLEVEL_OUTOF10: 9

## 2025-05-10 ENCOUNTER — HOSPITAL ENCOUNTER (EMERGENCY)
Facility: HOSPITAL | Age: 70
Discharge: HOME | End: 2025-05-10
Attending: STUDENT IN AN ORGANIZED HEALTH CARE EDUCATION/TRAINING PROGRAM
Payer: MEDICARE

## 2025-05-10 ENCOUNTER — APPOINTMENT (OUTPATIENT)
Facility: HOSPITAL | Age: 70
End: 2025-05-10
Payer: MEDICARE

## 2025-05-10 VITALS
HEART RATE: 98 BPM | SYSTOLIC BLOOD PRESSURE: 156 MMHG | DIASTOLIC BLOOD PRESSURE: 95 MMHG | OXYGEN SATURATION: 99 % | TEMPERATURE: 98.7 F | WEIGHT: 175.93 LBS | RESPIRATION RATE: 18 BRPM | HEIGHT: 64 IN | BODY MASS INDEX: 30.04 KG/M2

## 2025-05-10 DIAGNOSIS — E86.0 DEHYDRATION: Primary | ICD-10-CM

## 2025-05-10 DIAGNOSIS — E87.6 HYPOKALEMIA: ICD-10-CM

## 2025-05-10 LAB
ALBUMIN SERPL-MCNC: 3.5 G/DL (ref 3.5–5)
ALBUMIN/GLOB SERPL: 0.9 (ref 1.1–2.2)
ALP SERPL-CCNC: 60 U/L (ref 45–117)
ALT SERPL-CCNC: 30 U/L (ref 12–78)
ANION GAP SERPL CALC-SCNC: 8 MMOL/L (ref 2–12)
APPEARANCE UR: CLEAR
AST SERPL-CCNC: 20 U/L (ref 15–37)
BACTERIA URNS QL MICRO: ABNORMAL /HPF
BASOPHILS # BLD: 0.03 K/UL (ref 0–0.1)
BASOPHILS NFR BLD: 0.2 % (ref 0–1)
BILIRUB SERPL-MCNC: 0.5 MG/DL (ref 0.2–1)
BILIRUB UR QL: NEGATIVE
BUN SERPL-MCNC: 77 MG/DL (ref 6–20)
BUN/CREAT SERPL: 42 (ref 12–20)
CALCIUM SERPL-MCNC: 11.8 MG/DL (ref 8.5–10.1)
CHLORIDE SERPL-SCNC: 95 MMOL/L (ref 97–108)
CO2 SERPL-SCNC: 29 MMOL/L (ref 21–32)
COLOR UR: ABNORMAL
CREAT SERPL-MCNC: 1.83 MG/DL (ref 0.55–1.02)
DIFFERENTIAL METHOD BLD: ABNORMAL
EKG ATRIAL RATE: 98 BPM
EKG DIAGNOSIS: NORMAL
EKG P AXIS: 55 DEGREES
EKG P-R INTERVAL: 174 MS
EKG Q-T INTERVAL: 324 MS
EKG QRS DURATION: 66 MS
EKG QTC CALCULATION (BAZETT): 413 MS
EKG R AXIS: 68 DEGREES
EKG T AXIS: 32 DEGREES
EKG VENTRICULAR RATE: 98 BPM
EOSINOPHIL # BLD: 0 K/UL (ref 0–0.4)
EOSINOPHIL NFR BLD: 0 % (ref 0–7)
EPITH CASTS URNS QL MICRO: ABNORMAL /LPF
ERYTHROCYTE [DISTWIDTH] IN BLOOD BY AUTOMATED COUNT: 16.7 % (ref 11.5–14.5)
GLOBULIN SER CALC-MCNC: 4.1 G/DL (ref 2–4)
GLUCOSE SERPL-MCNC: 180 MG/DL (ref 65–100)
GLUCOSE UR STRIP.AUTO-MCNC: 250 MG/DL
HCT VFR BLD AUTO: 25 % (ref 35–47)
HGB BLD-MCNC: 8.1 G/DL (ref 11.5–16)
HGB UR QL STRIP: NEGATIVE
HYALINE CASTS URNS QL MICRO: ABNORMAL /LPF (ref 0–2)
IMM GRANULOCYTES # BLD AUTO: 0.09 K/UL (ref 0–0.04)
IMM GRANULOCYTES NFR BLD AUTO: 0.5 % (ref 0–0.5)
KETONES UR QL STRIP.AUTO: NEGATIVE MG/DL
LEUKOCYTE ESTERASE UR QL STRIP.AUTO: ABNORMAL
LIPASE SERPL-CCNC: 37 U/L (ref 13–75)
LYMPHOCYTES # BLD: 1.8 K/UL (ref 0.8–3.5)
LYMPHOCYTES NFR BLD: 10.9 % (ref 12–49)
MCH RBC QN AUTO: 30.3 PG (ref 26–34)
MCHC RBC AUTO-ENTMCNC: 32.4 G/DL (ref 30–36.5)
MCV RBC AUTO: 93.6 FL (ref 80–99)
MONOCYTES # BLD: 0.69 K/UL (ref 0–1)
MONOCYTES NFR BLD: 4.2 % (ref 5–13)
NEUTS SEG # BLD: 13.93 K/UL (ref 1.8–8)
NEUTS SEG NFR BLD: 84.2 % (ref 32–75)
NITRITE UR QL STRIP.AUTO: NEGATIVE
NRBC # BLD: 0 K/UL (ref 0–0.01)
NRBC BLD-RTO: 0 PER 100 WBC
PH UR STRIP: 6 (ref 5–8)
PLATELET # BLD AUTO: 363 K/UL (ref 150–400)
PMV BLD AUTO: 10 FL (ref 8.9–12.9)
POTASSIUM SERPL-SCNC: 2.7 MMOL/L (ref 3.5–5.1)
PROT SERPL-MCNC: 7.6 G/DL (ref 6.4–8.2)
PROT UR STRIP-MCNC: NEGATIVE MG/DL
RBC # BLD AUTO: 2.67 M/UL (ref 3.8–5.2)
RBC #/AREA URNS HPF: ABNORMAL /HPF (ref 0–5)
SODIUM SERPL-SCNC: 132 MMOL/L (ref 136–145)
SP GR UR REFRACTOMETRY: 1.02 (ref 1–1.03)
TROPONIN I SERPL HS-MCNC: 9 NG/L (ref 0–51)
URINE CULTURE IF INDICATED: ABNORMAL
UROBILINOGEN UR QL STRIP.AUTO: 0.2 EU/DL (ref 0.2–1)
WBC # BLD AUTO: 16.5 K/UL (ref 3.6–11)
WBC URNS QL MICRO: ABNORMAL /HPF (ref 0–4)

## 2025-05-10 PROCEDURE — 6360000002 HC RX W HCPCS: Performed by: STUDENT IN AN ORGANIZED HEALTH CARE EDUCATION/TRAINING PROGRAM

## 2025-05-10 PROCEDURE — 6360000004 HC RX CONTRAST MEDICATION: Performed by: STUDENT IN AN ORGANIZED HEALTH CARE EDUCATION/TRAINING PROGRAM

## 2025-05-10 PROCEDURE — 87086 URINE CULTURE/COLONY COUNT: CPT

## 2025-05-10 PROCEDURE — 81001 URINALYSIS AUTO W/SCOPE: CPT

## 2025-05-10 PROCEDURE — 96374 THER/PROPH/DIAG INJ IV PUSH: CPT

## 2025-05-10 PROCEDURE — 74177 CT ABD & PELVIS W/CONTRAST: CPT

## 2025-05-10 PROCEDURE — 96361 HYDRATE IV INFUSION ADD-ON: CPT

## 2025-05-10 PROCEDURE — 6370000000 HC RX 637 (ALT 250 FOR IP): Performed by: STUDENT IN AN ORGANIZED HEALTH CARE EDUCATION/TRAINING PROGRAM

## 2025-05-10 PROCEDURE — 2580000003 HC RX 258: Performed by: STUDENT IN AN ORGANIZED HEALTH CARE EDUCATION/TRAINING PROGRAM

## 2025-05-10 RX ORDER — ONDANSETRON 2 MG/ML
4 INJECTION INTRAMUSCULAR; INTRAVENOUS ONCE
Status: COMPLETED | OUTPATIENT
Start: 2025-05-10 | End: 2025-05-10

## 2025-05-10 RX ORDER — IOPAMIDOL 755 MG/ML
100 INJECTION, SOLUTION INTRAVASCULAR
Status: COMPLETED | OUTPATIENT
Start: 2025-05-10 | End: 2025-05-10

## 2025-05-10 RX ORDER — POTASSIUM CHLORIDE 1500 MG/1
20 TABLET, EXTENDED RELEASE ORAL DAILY
Qty: 7 TABLET | Refills: 0 | Status: SHIPPED | OUTPATIENT
Start: 2025-05-10

## 2025-05-10 RX ADMIN — ONDANSETRON 4 MG: 2 INJECTION, SOLUTION INTRAMUSCULAR; INTRAVENOUS at 01:41

## 2025-05-10 RX ADMIN — DEXTROSE AND SODIUM CHLORIDE 1000 ML: 5; .9 INJECTION, SOLUTION INTRAVENOUS at 01:45

## 2025-05-10 RX ADMIN — IOPAMIDOL 100 ML: 755 INJECTION, SOLUTION INTRAVENOUS at 02:22

## 2025-05-10 RX ADMIN — POTASSIUM BICARBONATE 40 MEQ: 782 TABLET, EFFERVESCENT ORAL at 01:41

## 2025-05-10 NOTE — DISCHARGE INSTRUCTIONS
Please drink plenty of liquids.  Please follow-up with your doctor over the next week for reassessment.

## 2025-05-10 NOTE — ED PROVIDER NOTES
[facility] EMERGENCY DEPARTMENT  EMERGENCY DEPARTMENT ENCOUNTER       Pt Name: Mandi Whittaker  MRN: [id number]  Birthdate 1955  Date of evaluation: 5/9/2025  Provider: Jos Ledezma MD   PCP: Aniket Almanzar Jr., MD  Note Started: 3:29 AM EDT 5/10/25     CHIEF COMPLAINT       Chief Complaint   Patient presents with    Abdominal Pain     Pt arrived to ED from home with upper abd pain that started earlier, pt denies N/V/D.  Pt reports last BM yesterday. Denies urinary symptoms.         HISTORY OF PRESENT ILLNESS: 1 or more elements      [facility]  HPI Limitations: {HPI Limitations (Optional):06578}     Mandi Whittaker is a 69 y.o. female who presents ***     Nursing Notes were all reviewed and agreed with or any disagreements were addressed in the HPI.     REVIEW OF SYSTEMS      Review of Systems     Positives and Pertinent negatives as per HPI.    PAST HISTORY     Past Medical History:  Past Medical History:   Diagnosis Date    Arthritis     Asthma     Cervical cancer (HCC)     Colon cancer (HCC)     February 2016 - pt denies    Fibromyalgia     GERD (gastroesophageal reflux disease)     Hypertension     Osteoporosis     Seizures (HCC)     as of 5/21/24 LAST SEIZURE 2013    Ulcer          Past Surgical History:  Past Surgical History:   Procedure Laterality Date    BREAST BIOPSY Right     negative surgical biopsy    COLONOSCOPY N/A 4/16/2019    COLONOSCOPY performed by Willie Trent MD at [facility] ENDOSCOPY    COLONOSCOPY  02/2016    COLONOSCOPY N/A 5/22/2024    COLONOSCOPY performed by Dalton Hendricks MD at [facility] ENDOSCOPY    HYSTERECTOMY (CERVIX STATUS UNKNOWN)      NEUROLOGICAL SURGERY  2009    brain surgery for seizures       Family History:  Family History   Problem Relation Age of Onset    Cancer Sister 52        breast    Breast Cancer Sister         40's    Suicide Sister     Crohn's Disease Father     Stroke Father     Hypertension Father     Diabetes Father      Get Your Medications        These medications were sent to [facility] #[id number] - [place], [place] - [address] - P [phone] - F [phone]  [address] [facility] [address]      Hours: 24-hours Phone: [phone]   potassium chloride 20 MEQ extended release tablet           DISCONTINUED MEDICATIONS:  Current Discharge Medication List          I am the Primary Clinician of Record.   Jos Ledezma MD (electronically signed)      (Please note that parts of this dictation were completed with voice recognition software. Quite often unanticipated grammatical, syntax, homophones, and other interpretive errors are inadvertently transcribed by the computer software. Please disregards these errors. Please excuse any errors that have escaped final proofreading.)

## 2025-05-11 LAB
BACTERIA SPEC CULT: NORMAL
SERVICE CMNT-IMP: NORMAL

## 2025-05-12 ENCOUNTER — HOSPITAL ENCOUNTER (INPATIENT)
Facility: HOSPITAL | Age: 70
LOS: 2 days | Discharge: HOME | DRG: 378 | End: 2025-05-15
Attending: STUDENT IN AN ORGANIZED HEALTH CARE EDUCATION/TRAINING PROGRAM | Admitting: STUDENT IN AN ORGANIZED HEALTH CARE EDUCATION/TRAINING PROGRAM
Payer: MEDICARE

## 2025-05-12 DIAGNOSIS — R10.13 DYSPEPSIA: ICD-10-CM

## 2025-05-12 DIAGNOSIS — E87.6 HYPOKALEMIA: ICD-10-CM

## 2025-05-12 DIAGNOSIS — D62 ACUTE BLOOD LOSS ANEMIA: Primary | ICD-10-CM

## 2025-05-12 LAB
ALBUMIN SERPL-MCNC: 2.7 G/DL (ref 3.5–5)
ALBUMIN/GLOB SERPL: 0.7 (ref 1.1–2.2)
ALP SERPL-CCNC: 53 U/L (ref 45–117)
ALT SERPL-CCNC: 26 U/L (ref 12–78)
ANION GAP SERPL CALC-SCNC: 7 MMOL/L (ref 2–12)
AST SERPL-CCNC: 20 U/L (ref 15–37)
BASOPHILS # BLD: 0.02 K/UL (ref 0–0.1)
BASOPHILS NFR BLD: 0.1 % (ref 0–1)
BILIRUB SERPL-MCNC: 0.5 MG/DL (ref 0.2–1)
BUN SERPL-MCNC: 44 MG/DL (ref 6–20)
BUN/CREAT SERPL: 32 (ref 12–20)
CALCIUM SERPL-MCNC: 9.9 MG/DL (ref 8.5–10.1)
CHLORIDE SERPL-SCNC: 97 MMOL/L (ref 97–108)
CO2 SERPL-SCNC: 28 MMOL/L (ref 21–32)
CREAT SERPL-MCNC: 1.39 MG/DL (ref 0.55–1.02)
DIFFERENTIAL METHOD BLD: ABNORMAL
EOSINOPHIL # BLD: 0 K/UL (ref 0–0.4)
EOSINOPHIL NFR BLD: 0 % (ref 0–7)
ERYTHROCYTE [DISTWIDTH] IN BLOOD BY AUTOMATED COUNT: 17.6 % (ref 11.5–14.5)
GLOBULIN SER CALC-MCNC: 3.7 G/DL (ref 2–4)
GLUCOSE SERPL-MCNC: 139 MG/DL (ref 65–100)
HCT VFR BLD AUTO: 20.3 % (ref 35–47)
HGB BLD-MCNC: 6.5 G/DL (ref 11.5–16)
HISTORY CHECK: NORMAL
IMM GRANULOCYTES # BLD AUTO: 0.16 K/UL (ref 0–0.04)
IMM GRANULOCYTES NFR BLD AUTO: 0.8 % (ref 0–0.5)
INR PPP: 1.2 (ref 0.9–1.1)
LYMPHOCYTES # BLD: 1.94 K/UL (ref 0.8–3.5)
LYMPHOCYTES NFR BLD: 9.5 % (ref 12–49)
MCH RBC QN AUTO: 30.8 PG (ref 26–34)
MCHC RBC AUTO-ENTMCNC: 32 G/DL (ref 30–36.5)
MCV RBC AUTO: 96.2 FL (ref 80–99)
MONOCYTES # BLD: 1.06 K/UL (ref 0–1)
MONOCYTES NFR BLD: 5.2 % (ref 5–13)
NEUTS SEG # BLD: 17.22 K/UL (ref 1.8–8)
NEUTS SEG NFR BLD: 84.4 % (ref 32–75)
NRBC # BLD: 0.05 K/UL (ref 0–0.01)
NRBC BLD-RTO: 0.2 PER 100 WBC
PLATELET # BLD AUTO: 396 K/UL (ref 150–400)
PMV BLD AUTO: 10.2 FL (ref 8.9–12.9)
POTASSIUM SERPL-SCNC: 2.9 MMOL/L (ref 3.5–5.1)
PROT SERPL-MCNC: 6.4 G/DL (ref 6.4–8.2)
PROTHROMBIN TIME: 12.1 SEC (ref 9.2–11.2)
RBC # BLD AUTO: 2.11 M/UL (ref 3.8–5.2)
RBC MORPH BLD: ABNORMAL
RBC MORPH BLD: ABNORMAL
SODIUM SERPL-SCNC: 132 MMOL/L (ref 136–145)
WBC # BLD AUTO: 20.4 K/UL (ref 3.6–11)

## 2025-05-12 PROCEDURE — 36430 TRANSFUSION BLD/BLD COMPNT: CPT

## 2025-05-12 PROCEDURE — 86900 BLOOD TYPING SEROLOGIC ABO: CPT

## 2025-05-12 PROCEDURE — 6360000002 HC RX W HCPCS: Performed by: STUDENT IN AN ORGANIZED HEALTH CARE EDUCATION/TRAINING PROGRAM

## 2025-05-12 PROCEDURE — 85025 COMPLETE CBC W/AUTO DIFF WBC: CPT

## 2025-05-12 PROCEDURE — 80053 COMPREHEN METABOLIC PANEL: CPT

## 2025-05-12 PROCEDURE — 96374 THER/PROPH/DIAG INJ IV PUSH: CPT

## 2025-05-12 PROCEDURE — 85610 PROTHROMBIN TIME: CPT

## 2025-05-12 PROCEDURE — 86901 BLOOD TYPING SEROLOGIC RH(D): CPT

## 2025-05-12 PROCEDURE — 99285 EMERGENCY DEPT VISIT HI MDM: CPT

## 2025-05-12 PROCEDURE — P9016 RBC LEUKOCYTES REDUCED: HCPCS

## 2025-05-12 PROCEDURE — 86850 RBC ANTIBODY SCREEN: CPT

## 2025-05-12 PROCEDURE — 86923 COMPATIBILITY TEST ELECTRIC: CPT

## 2025-05-12 PROCEDURE — 36415 COLL VENOUS BLD VENIPUNCTURE: CPT

## 2025-05-12 PROCEDURE — 2580000003 HC RX 258: Performed by: STUDENT IN AN ORGANIZED HEALTH CARE EDUCATION/TRAINING PROGRAM

## 2025-05-12 RX ORDER — SODIUM CHLORIDE 9 MG/ML
INJECTION, SOLUTION INTRAVENOUS PRN
Status: DISCONTINUED | OUTPATIENT
Start: 2025-05-12 | End: 2025-05-15 | Stop reason: HOSPADM

## 2025-05-12 RX ADMIN — PANTOPRAZOLE SODIUM 40 MG: 40 INJECTION, POWDER, LYOPHILIZED, FOR SOLUTION INTRAVENOUS at 22:27

## 2025-05-12 ASSESSMENT — PAIN - FUNCTIONAL ASSESSMENT: PAIN_FUNCTIONAL_ASSESSMENT: 0-10

## 2025-05-12 ASSESSMENT — PAIN DESCRIPTION - LOCATION: LOCATION: ABDOMEN

## 2025-05-12 ASSESSMENT — PAIN SCALES - GENERAL: PAINLEVEL_OUTOF10: 10

## 2025-05-12 ASSESSMENT — PAIN DESCRIPTION - ORIENTATION: ORIENTATION: MID

## 2025-05-12 ASSESSMENT — PAIN DESCRIPTION - DESCRIPTORS: DESCRIPTORS: ACHING

## 2025-05-13 PROBLEM — K92.2 GI BLEED: Status: ACTIVE | Noted: 2025-05-13

## 2025-05-13 LAB
ALBUMIN SERPL-MCNC: 2.7 G/DL (ref 3.5–5)
ALBUMIN/GLOB SERPL: 0.7 (ref 1.1–2.2)
ALP SERPL-CCNC: 56 U/L (ref 45–117)
ALT SERPL-CCNC: 30 U/L (ref 12–78)
ANION GAP SERPL CALC-SCNC: 9 MMOL/L (ref 2–12)
AST SERPL-CCNC: 17 U/L (ref 15–37)
BASOPHILS # BLD: 0.09 K/UL (ref 0–0.1)
BASOPHILS NFR BLD: 0.4 % (ref 0–1)
BILIRUB SERPL-MCNC: 0.8 MG/DL (ref 0.2–1)
BUN SERPL-MCNC: 40 MG/DL (ref 6–20)
BUN/CREAT SERPL: 30 (ref 12–20)
CALCIUM SERPL-MCNC: 9.5 MG/DL (ref 8.5–10.1)
CHLORIDE SERPL-SCNC: 95 MMOL/L (ref 97–108)
CO2 SERPL-SCNC: 24 MMOL/L (ref 21–32)
CREAT SERPL-MCNC: 1.32 MG/DL (ref 0.55–1.02)
DIFFERENTIAL METHOD BLD: ABNORMAL
EOSINOPHIL # BLD: 0 K/UL (ref 0–0.4)
EOSINOPHIL NFR BLD: 0 % (ref 0–7)
ERYTHROCYTE [DISTWIDTH] IN BLOOD BY AUTOMATED COUNT: 16.2 % (ref 11.5–14.5)
GLOBULIN SER CALC-MCNC: 3.8 G/DL (ref 2–4)
GLUCOSE SERPL-MCNC: 119 MG/DL (ref 65–100)
HCT VFR BLD AUTO: 26.2 % (ref 35–47)
HGB BLD-MCNC: 8.2 G/DL (ref 11.5–16)
IMM GRANULOCYTES # BLD AUTO: 0.2 K/UL (ref 0–0.04)
IMM GRANULOCYTES NFR BLD AUTO: 0.9 % (ref 0–0.5)
LYMPHOCYTES # BLD: 4.23 K/UL (ref 0.8–3.5)
LYMPHOCYTES NFR BLD: 19.5 % (ref 12–49)
MCH RBC QN AUTO: 31.3 PG (ref 26–34)
MCHC RBC AUTO-ENTMCNC: 31.3 G/DL (ref 30–36.5)
MCV RBC AUTO: 100 FL (ref 80–99)
MONOCYTES # BLD: 2.06 K/UL (ref 0–1)
MONOCYTES NFR BLD: 9.5 % (ref 5–13)
NEUTS SEG # BLD: 15.12 K/UL (ref 1.8–8)
NEUTS SEG NFR BLD: 69.7 % (ref 32–75)
NRBC # BLD: 0.08 K/UL (ref 0–0.01)
NRBC BLD-RTO: 0.4 PER 100 WBC
PLATELET # BLD AUTO: 281 K/UL (ref 150–400)
PMV BLD AUTO: 9.6 FL (ref 8.9–12.9)
POTASSIUM SERPL-SCNC: 3.7 MMOL/L (ref 3.5–5.1)
PROT SERPL-MCNC: 6.5 G/DL (ref 6.4–8.2)
RBC # BLD AUTO: 2.62 M/UL (ref 3.8–5.2)
RBC MORPH BLD: ABNORMAL
RBC MORPH BLD: ABNORMAL
SODIUM SERPL-SCNC: 128 MMOL/L (ref 136–145)
WBC # BLD AUTO: 21.7 K/UL (ref 3.6–11)

## 2025-05-13 PROCEDURE — 6370000000 HC RX 637 (ALT 250 FOR IP): Performed by: STUDENT IN AN ORGANIZED HEALTH CARE EDUCATION/TRAINING PROGRAM

## 2025-05-13 PROCEDURE — 6360000002 HC RX W HCPCS: Performed by: STUDENT IN AN ORGANIZED HEALTH CARE EDUCATION/TRAINING PROGRAM

## 2025-05-13 PROCEDURE — 36415 COLL VENOUS BLD VENIPUNCTURE: CPT

## 2025-05-13 PROCEDURE — 80177 DRUG SCRN QUAN LEVETIRACETAM: CPT

## 2025-05-13 PROCEDURE — 2580000003 HC RX 258: Performed by: STUDENT IN AN ORGANIZED HEALTH CARE EDUCATION/TRAINING PROGRAM

## 2025-05-13 PROCEDURE — 30233N1 TRANSFUSION OF NONAUTOLOGOUS RED BLOOD CELLS INTO PERIPHERAL VEIN, PERCUTANEOUS APPROACH: ICD-10-PCS | Performed by: INTERNAL MEDICINE

## 2025-05-13 PROCEDURE — 80053 COMPREHEN METABOLIC PANEL: CPT

## 2025-05-13 PROCEDURE — 85025 COMPLETE CBC W/AUTO DIFF WBC: CPT

## 2025-05-13 PROCEDURE — 84300 ASSAY OF URINE SODIUM: CPT

## 2025-05-13 PROCEDURE — 83935 ASSAY OF URINE OSMOLALITY: CPT

## 2025-05-13 PROCEDURE — 2500000003 HC RX 250 WO HCPCS: Performed by: STUDENT IN AN ORGANIZED HEALTH CARE EDUCATION/TRAINING PROGRAM

## 2025-05-13 PROCEDURE — 2060000000 HC ICU INTERMEDIATE R&B

## 2025-05-13 RX ORDER — ALBUTEROL SULFATE 90 UG/1
2 INHALANT RESPIRATORY (INHALATION) EVERY 6 HOURS PRN
Status: DISCONTINUED | OUTPATIENT
Start: 2025-05-13 | End: 2025-05-13 | Stop reason: SDUPTHER

## 2025-05-13 RX ORDER — LOSARTAN POTASSIUM 25 MG/1
12.5 TABLET ORAL DAILY
Status: DISCONTINUED | OUTPATIENT
Start: 2025-05-13 | End: 2025-05-15 | Stop reason: HOSPADM

## 2025-05-13 RX ORDER — LACOSAMIDE 50 MG/1
150 TABLET ORAL 2 TIMES DAILY
Status: DISCONTINUED | OUTPATIENT
Start: 2025-05-13 | End: 2025-05-15 | Stop reason: HOSPADM

## 2025-05-13 RX ORDER — ACETAMINOPHEN 325 MG/1
650 TABLET ORAL EVERY 6 HOURS PRN
Status: DISCONTINUED | OUTPATIENT
Start: 2025-05-13 | End: 2025-05-15 | Stop reason: HOSPADM

## 2025-05-13 RX ORDER — SODIUM CHLORIDE 0.9 % (FLUSH) 0.9 %
5-40 SYRINGE (ML) INJECTION PRN
Status: DISCONTINUED | OUTPATIENT
Start: 2025-05-13 | End: 2025-05-15 | Stop reason: HOSPADM

## 2025-05-13 RX ORDER — SODIUM CHLORIDE 9 MG/ML
INJECTION, SOLUTION INTRAVENOUS CONTINUOUS
Status: DISCONTINUED | OUTPATIENT
Start: 2025-05-13 | End: 2025-05-14

## 2025-05-13 RX ORDER — POTASSIUM CHLORIDE 1500 MG/1
40 TABLET, EXTENDED RELEASE ORAL ONCE
Status: COMPLETED | OUTPATIENT
Start: 2025-05-13 | End: 2025-05-13

## 2025-05-13 RX ORDER — ALBUTEROL SULFATE 0.83 MG/ML
2.5 SOLUTION RESPIRATORY (INHALATION) EVERY 6 HOURS PRN
Status: DISCONTINUED | OUTPATIENT
Start: 2025-05-13 | End: 2025-05-15 | Stop reason: HOSPADM

## 2025-05-13 RX ORDER — ACETAMINOPHEN 650 MG/1
650 SUPPOSITORY RECTAL EVERY 6 HOURS PRN
Status: DISCONTINUED | OUTPATIENT
Start: 2025-05-13 | End: 2025-05-15 | Stop reason: HOSPADM

## 2025-05-13 RX ORDER — LEVETIRACETAM 500 MG/1
3000 TABLET ORAL 2 TIMES DAILY
Status: DISCONTINUED | OUTPATIENT
Start: 2025-05-13 | End: 2025-05-15 | Stop reason: HOSPADM

## 2025-05-13 RX ORDER — AMLODIPINE BESYLATE 5 MG/1
5 TABLET ORAL DAILY
Status: DISCONTINUED | OUTPATIENT
Start: 2025-05-13 | End: 2025-05-15 | Stop reason: HOSPADM

## 2025-05-13 RX ORDER — SODIUM CHLORIDE 0.9 % (FLUSH) 0.9 %
5-40 SYRINGE (ML) INJECTION EVERY 12 HOURS SCHEDULED
Status: DISCONTINUED | OUTPATIENT
Start: 2025-05-13 | End: 2025-05-15 | Stop reason: HOSPADM

## 2025-05-13 RX ORDER — ONDANSETRON 2 MG/ML
4 INJECTION INTRAMUSCULAR; INTRAVENOUS EVERY 6 HOURS PRN
Status: DISCONTINUED | OUTPATIENT
Start: 2025-05-13 | End: 2025-05-15 | Stop reason: HOSPADM

## 2025-05-13 RX ORDER — HYDROCHLOROTHIAZIDE 25 MG/1
12.5 TABLET ORAL DAILY
Status: DISCONTINUED | OUTPATIENT
Start: 2025-05-13 | End: 2025-05-15 | Stop reason: HOSPADM

## 2025-05-13 RX ORDER — SODIUM CHLORIDE 9 MG/ML
INJECTION, SOLUTION INTRAVENOUS PRN
Status: DISCONTINUED | OUTPATIENT
Start: 2025-05-13 | End: 2025-05-15 | Stop reason: HOSPADM

## 2025-05-13 RX ADMIN — POTASSIUM CHLORIDE 40 MEQ: 1500 TABLET, EXTENDED RELEASE ORAL at 00:42

## 2025-05-13 RX ADMIN — LEVETIRACETAM 3000 MG: 500 TABLET, FILM COATED ORAL at 20:44

## 2025-05-13 RX ADMIN — SODIUM CHLORIDE, PRESERVATIVE FREE 40 MG: 5 INJECTION INTRAVENOUS at 18:13

## 2025-05-13 RX ADMIN — SODIUM CHLORIDE, PRESERVATIVE FREE 10 ML: 5 INJECTION INTRAVENOUS at 20:46

## 2025-05-13 RX ADMIN — LACOSAMIDE 150 MG: 50 TABLET, FILM COATED ORAL at 02:41

## 2025-05-13 RX ADMIN — LACOSAMIDE 150 MG: 50 TABLET, FILM COATED ORAL at 20:45

## 2025-05-13 RX ADMIN — LEVETIRACETAM 3000 MG: 500 TABLET, FILM COATED ORAL at 09:11

## 2025-05-13 RX ADMIN — SERTRALINE 50 MG: 50 TABLET, FILM COATED ORAL at 09:11

## 2025-05-13 RX ADMIN — LACOSAMIDE 150 MG: 50 TABLET, FILM COATED ORAL at 09:11

## 2025-05-13 RX ADMIN — ACETAMINOPHEN 650 MG: 325 TABLET ORAL at 20:45

## 2025-05-13 RX ADMIN — SODIUM CHLORIDE, PRESERVATIVE FREE 10 ML: 5 INJECTION INTRAVENOUS at 09:11

## 2025-05-13 RX ADMIN — SODIUM CHLORIDE, PRESERVATIVE FREE 40 MG: 5 INJECTION INTRAVENOUS at 05:28

## 2025-05-13 RX ADMIN — SODIUM CHLORIDE: 0.9 INJECTION, SOLUTION INTRAVENOUS at 21:41

## 2025-05-13 RX ADMIN — LEVETIRACETAM 3000 MG: 500 TABLET, FILM COATED ORAL at 02:41

## 2025-05-13 RX ADMIN — ACETAMINOPHEN 650 MG: 325 TABLET ORAL at 12:59

## 2025-05-13 ASSESSMENT — PAIN DESCRIPTION - DESCRIPTORS
DESCRIPTORS: SORE
DESCRIPTORS: TENDER;SORE

## 2025-05-13 ASSESSMENT — PAIN SCALES - GENERAL
PAINLEVEL_OUTOF10: 8
PAINLEVEL_OUTOF10: 6

## 2025-05-13 ASSESSMENT — PAIN DESCRIPTION - ORIENTATION: ORIENTATION: LEFT

## 2025-05-13 ASSESSMENT — PAIN DESCRIPTION - LOCATION: LOCATION: CHEST

## 2025-05-13 NOTE — PROGRESS NOTES
TRANSFER - IN REPORT:    Verbal report received from ROCIO Montoya on Mandi Whittaker  being received from ED for routine progression of patient care      Report consisted of patient's Situation, Background, Assessment and   Recommendations(SBAR).     Information from the following report(s) Nurse Handoff Report, Index, ED Encounter Summary, ED SBAR, MAR, Recent Results, and Cardiac Rhythm NSR  was reviewed with the receiving nurse.    Opportunity for questions and clarification was provided.      Assessment completed upon patient's arrival to unit and care assumed.       0235 Noted two units PRC's ordered, only want administered in ED. Went down to blood bank to collect second unit and  stated she only saw one ordered.     0238 Notified MD Fantasma of above. Per MD well check H&H prior to second unit.     0406 Notified MD Fantasma of HGB 8.2 - will hold off on second unit.     End of Shift Note    Bedside shift change report given to ROCIO Vidales (oncoming nurse) by Ingris Hunt RN (offgoing nurse).  Report included the following information SBAR, Kardex, Intake/Output, MAR, Recent Results, and Cardiac Rhythm NSR    Shift worked:  1900 - 0700     Shift summary and any significant changes:     ED admit overnight. HGB up to 8.2 s/p 1 unit PRBC's. Denies pain, N/V, or SOB. No melena noted overnight.      Concerns for physician to address:       Zone phone for oncoming shift:          Activity:  Level of Assistance: Minimal assist, patient does 75% or more  Number times ambulated in hallways past shift: 0  Number of times OOB to chair past shift: 0    Cardiac:   Cardiac Monitoring: Yes      Cardiac Rhythm: Sinus tachy    Access:  Current line(s): PIV     Genitourinary:   Urinary Status: Voiding, Bathroom privileges    Respiratory:   O2 Device: None (Room air)  Chronic home O2 use?: NO  Incentive spirometer at bedside: NO    GI:  Last BM (including prior to admit):  (PTA, pt currently unable to  recall)  Current diet:  Diet NPO Exceptions are: Sips of Water with Meds  Passing flatus: YES    Pain Management:   Patient states pain is manageable on current regimen: YES    Skin:  Kelby Scale Score: 18  Interventions: Wound Offloading (Prevention Methods): Adaptive equipment, Elevate heels, Pillows, Repositioning, Turning    Patient Safety:  Fall Risk: Nursing Judgement-Fall Risk High(Add Comments): Yes  Fall Risk Interventions  Nursing Judgement-Fall Risk High(Add Comments): Yes  Toilet Every 2 Hours-In Advance of Need: Yes  Hourly Visual Checks: Awake, In bed  Fall Visual Posted: Fall sign posted, Socks  Room Door Open: Yes  Alarm On: Bed  Patient Moved Closer to Nursing Station: No    Active Consults:   IP CONSULT TO GI    Length of Stay:  Expected LOS: 4  Actual LOS: 0    Ingris Hunt RN

## 2025-05-13 NOTE — CONSULTS
Chelle Tapia, NP-C                       [phone] cell                  Monday-Thursday 7:30-5:00                           Gastroenterology Consultation Note      Admit Date: 5/12/2025  Consult Date: 5/13/2025   I greatly appreciate your asking me to see Mandi Whittaker, thank you very much for the opportunity to participate in her care.    Narrative Assessment and Plan   GI consultation for upper GI bleeding.  69-year-old female with PMH sacroiliitis, chronic hip pain, osteoporosis, asthma, cervical cancer, osteoarthritis, fibromyalgia, GERD, HTN, seizure disorder.  Came to ER with complaint of epigastric pain and melena--cannot tell me how long she has been having melena.  No nausea, vomiting, or GERD complaints. No weight loss or BRBPR. Has been taking large amounts of NSAIDs as well as steroids for her chronic hip pain.  Recently had steroid injection into SI joint.  She has never had an EGD.  Hgb down to 6.5 from 8.1.  INR 1.2, platelets 281, BUN 40, creatinine 1.32, WBC 21.  UA positive for UTI but culture negative.  CTAP with contrast without acute GI abnormality.  She had a screening colonoscopy 5/2024 for personal history of colon polyps and family history of colon polyps.  Fair prep, benign polyp, diverticulosis, and medium sized internal hemorrhoids.  Recommended repeating in 3 years.  History of TVA in 2016.  She does not use tobacco or alcohol.    Impression:  Melena  Anemia  NSAID use  Hyponatremia  Sacroiliitis  Chronic hip pain  Osteoporosis  Asthma  Hx of cervical cancer  OA  Fibromyalgia  GERD  HTN  Seizure disorder    Plan EGD tomorrow. Discussed procedure including risks with patient and her granddaughter and she is agreeable. Ok for CLD today with no red dye, NPO past midnight. Sodium 128 today, please correct any electrolyte abnormalities.   Continue PPI BID  Follow H&H, transfuse

## 2025-05-13 NOTE — ED NOTES
ED TO INPATIENT SBAR HANDOFF    Verbal report given to Ingris on Mandi Whittaker  being transferred to [facility] for routine progression of patient care       Patient Name: Mandi Whittaker   Preferred Name: Mandi  : 1955  69 y.o.   Family/Caregiver Present: no   Code Status Order: Full Code  PO Status: NPO:Yes  Telemetry Order: Yes  C-SSRS: Risk of Suicide: No Risk  Sitter no   Restraints:  no     Information from the following report(s) ED Encounter Summary, ED SBAR, Adult Overview, Intake/Output, MAR, Recent Results, Cardiac Rhythm NSR, and Neuro Assessment was reviewed with the receiving nurse.    Mu Fall Assessment:    Presents to emergency department  because of falls (Syncope, seizure, or loss of consciousness): No  Age > 70: No  Altered Mental Status, Intoxication with alcohol or substance confusion (Disorientation, impaired judgment, poor safety awaremess, or inability to follow instructions): No  Impaired Mobility: Ambulates or transfers with assistive devices or assistance; Unable to ambulate or transer.: No  Nursing Judgement: No          Situation  Chief Complaint   Patient presents with    Abdominal Pain     Patient ambulatory to triage c/o dark stool and abdominal pain. Patient was seen last Friday and reports that she continues to have the same symptoms. Family reports being concerned for a bleeding ulcer.        Mental Status: oriented, alert, and able to concentrate and follow conversation  Arrived from:Home  Imaging:   No orders to display     Abnormal labs:   Abnormal Labs Reviewed   CBC WITH AUTO DIFFERENTIAL - Abnormal; Notable for the following components:       Result Value    WBC 20.4 (*)     RBC 2.11 (*)     Hemoglobin 6.5 (*)     Hematocrit 20.3 (*)     RDW 17.6 (*)     Nucleated RBCs 0.2 (*)     nRBC 0.05 (*)     Neutrophils % 84.4 (*)     Lymphocytes % 9.5 (*)     Immature Granulocytes % 0.8 (*)     Neutrophils Absolute 17.22 (*)     Monocytes Absolute 1.06 (*)     Immature

## 2025-05-13 NOTE — PROGRESS NOTES
4 Eyes Skin Assessment     NAME:  Mandi Whittaker  YOB: 1955  MEDICAL RECORD NUMBER:  [id number]    The patient is being assessed for  Transfer to New Unit    I agree that at least one RN has performed a thorough Head to Toe Skin Assessment on the patient. ALL assessment sites listed below have been assessed.      Areas assessed by both nurses:    Head, Face, Ears, Shoulders, Back, Chest, Arms, Elbows, Hands, Sacrum. Buttock, Coccyx, Ischium, Legs. Feet and Heels, and Under Medical Devices         Does the Patient have a Wound? No noted wound(s)       Kelby Prevention initiated by RN: Yes  Wound Care Orders initiated by RN: No    Pressure Injury (Stage 3,4, Unstageable, DTI, NWPT, and Complex wounds) if present, place Wound referral order by RN under : No    New Ostomies, if present place, Ostomy referral order under : No     Nurse 1 eSignature: Electronically signed by Ingris Hunt RN on 5/13/25 at 3:26 AM EDT    **SHARE this note so that the co-signing nurse can place an eSignature**    Nurse 2 eSignature: Electronically signed by Lindsay Black RN on 5/13/25 at 4:31 AM EDT

## 2025-05-13 NOTE — CARE COORDINATION
Care Management Initial Assessment       RUR: 18% Moderate RUR  Readmission? No  1st IM letter given? Yes - 5/13  1st  letter given: No     05/13/25 0905   Service Assessment   Patient Orientation Alert and Oriented;Person;Place;Situation;Self   Cognition Alert   History Provided By Patient   Primary Caregiver Self   Accompanied By/Relationship granddaughhter   Support Systems Spouse/Significant Other;Family Members  (Aidan Whittaker Jr. (Spouse)  [phone])   Patient's Healthcare Decision Maker is: Legal Next of Kin   PCP Verified by CM No  (Elsi Saldana , APRN)   Prior Functional Level Independent in ADLs/IADLs   Current Functional Level Independent in ADLs/IADLs   Can patient return to prior living arrangement Yes   Ability to make needs known: Good   Family able to assist with home care needs: Yes   Would you like for me to discuss the discharge plan with any other family members/significant others, and if so, who? Yes  (Aidan Whittaker Jr. (Spouse)  [phone])   Financial Resources Medicare   Community Resources None   CM/SW Referral Disease Management Education   Social/Functional History   Lives With Spouse  (Aidan Whittaker Jr. (Spouse)  [phone])   Type of Home House   Home Layout One level   Home Access Stairs to enter with rails   Entrance Stairs - Number of Steps 4   Entrance Stairs - Rails Both   Home Equipment Cane;Walker - Rolling   Active  Yes   Discharge Planning   Type of Residence House   Living Arrangements Spouse/Significant Other  (Aidan Whittaker Jr. (Spouse)  [phone])   Current Services Prior To Admission None   Potential Assistance Needed N/A   Patient expects to be discharged to: House         The  ([facility]) conducted an initial meeting with the patient , formally introducing themselves and clarifying their role in discharge planning and transitional care. Demographic and insurance details were verified for accuracy. Notably, the patient has no prior

## 2025-05-13 NOTE — H&P
Hospitalist Admission Note    NAME:Mandi Whittaker   : 1955   MRN: [id number]     Date/Time: 2025 1:19 AM    Patient PCP: Aniket Almanzar Jr., MD    *Please be aware this note is formulated with assistance from Dragon voice-recognition dictation software. Please excuse any errors that may be present*    ______________________________________________________________________  Given the patient's current clinical presentation, I have a high level of concern for decompensation if discharged from the emergency department.  Complex decision making was performed, which includes reviewing the patient's available past medical records, laboratory results, and x-ray films.       My assessment of this patient's clinical condition and my plan of care is as follows.    Problem List:  Patient Active Problem List   Diagnosis    MCI (mild cognitive impairment)    Localization-related epilepsy, intractable (HCC)    Severe obesity (HCC)    Obstructive sleep apnea (adult) (pediatric)    GI bleed         Assessment / Plan:    Upper GI bleeding   Acute on chronic anemia   Likely in setting of frequent NSAID use recently for hip pain   - Hold all NSAIDs  - Hold VTE ppx overnight  - Transfusing RBC currently, will continue and check post-transfusion H&H  - IV PPI BID   - NPO  - GI consulted, appreciate assistance     Seizure disorder  - Continue home lacosamide  - Continue home keppra - discussed with pharmacy, confirmed she has been filling 3000mg BID as outpatient  - Check keppra level     HTN  - Hold home amlodipine, HCTZ, olmesartan in setting of bleeding     MCI  - Delirium precautions     Medical Decision Making:   I personally reviewed labs: All lab results for the last 24 hours reviewed.  CBC:    Recent Labs     25  1927   WBC 20.4*   HGB 6.5*   HCT 20.3*        BMP/CMP:    Recent Labs     25  2159   *   K 2.9*   CL 97   CO2 28   BUN 44*   BILITOT 0.5   ALKPHOS 53   GLOB 3.7   AST 20  Start Date End Date Taking? Authorizing Provider   potassium chloride (KLOR-CON M) 20 MEQ extended release tablet Take 1 tablet by mouth daily 5/10/25   Jos Ledezma MD   predniSONE (DELTASONE) 10 MG tablet Take 5tab(50mg)x3days, 4tab(40mg)x3days, 3tab(30mg)x3days, 2tab(20mg)x2days, 1tab(10mg)x2days. #42 4/6/25   Deric Cerrato MD   naproxen (NAPROSYN) 500 MG tablet Take 1 tablet by mouth 2 times daily 3/28/25   Bandar Robledo MD   lacosamide (VIMPAT) 150 MG TABS tablet Take 1 tablet by mouth 2 times daily for 360 days. Max Daily Amount: 300 mg 10/28/24 10/23/25  Ingris Moreno APRN - NP   sertraline (ZOLOFT) 50 MG tablet TAKE 1 TABLET BY MOUTH EVERY DAY 7/1/24   Ingris Moreno APRN - NP   albuterol sulfate HFA (PROVENTIL;VENTOLIN;PROAIR) 108 (90 Base) MCG/ACT inhaler INHALE 2 PUFFS EVERY 6 HOURS AS NEEDED FOR WHEEZE 4/24/24   Ingris Moreno APRN - NP   levETIRAcetam (KEPPRA) 1000 MG tablet TAKE 3 TABLETS BY MOUTH TWICE A DAY 4/22/24   Ingris Moreno APRN - NP   amLODIPine (NORVASC) 5 MG tablet Take 1 tablet by mouth daily    Automatic Reconciliation, Ar   atorvastatin (LIPITOR) 10 MG tablet Take by mouth daily  Patient not taking: Reported on 5/21/2024    Automatic Reconciliation, Ar   calcium carbonate (OS-JAMILA) 1250 (500 Ca) MG chewable tablet Take 1 tablet by mouth 2 times daily  Patient not taking: Reported on 5/21/2024    Automatic Reconciliation, Ar   denosumab (PROLIA) 60 MG/ML SOSY SC injection Inject 1 mL into the skin    Automatic Reconciliation, Ar   fluticasone (FLONASE) 50 MCG/ACT nasal spray 1 spray by Nasal route as needed    Automatic Reconciliation, Ar   hydroCHLOROthiazide (HYDRODIURIL) 12.5 MG tablet Take 1 tablet by mouth daily 12/13/15   Automatic Reconciliation, Ar   ibuprofen (ADVIL;MOTRIN) 200 MG CAPS capsule Take by mouth    Automatic Reconciliation, Ar   loratadine-pseudoephedrine (CLARITIN-D 24 HOUR)  MG per extended release tablet Take 1 tablet by mouth daily

## 2025-05-13 NOTE — CONSENT
Informed Consent for Blood Component Transfusion Note    I have discussed with the patient the rationale for blood component transfusion; its benefits in treating or preventing fatigue, organ damage, or death; and its risk which includes mild transfusion reactions, rare risk of blood borne infection, or more serious but rare reactions. I have discussed the alternatives to transfusion, including the risk and consequences of not receiving transfusion. The patient had an opportunity to ask questions and had agreed to proceed with transfusion of blood components.    Electronically signed by Rafael Mcfadden DO on 5/12/25 at 9:05 PM EDT

## 2025-05-13 NOTE — PLAN OF CARE
Problem: Pain  Goal: Verbalizes/displays adequate comfort level or baseline comfort level  Outcome: Progressing  Flowsheets (Taken 5/13/2025 0215)  Verbalizes/displays adequate comfort level or baseline comfort level:   Encourage patient to monitor pain and request assistance   Assess pain using appropriate pain scale   Administer analgesics based on type and severity of pain and evaluate response   Implement non-pharmacological measures as appropriate and evaluate response   Notify Licensed Independent Practitioner if interventions unsuccessful or patient reports new pain     Problem: Skin/Tissue Integrity  Goal: Skin integrity remains intact  Description: 1.  Monitor for areas of redness and/or skin breakdown2.  Assess vascular access sites hourly3.  Every 4-6 hours minimum:  Change oxygen saturation probe site4.  Every 4-6 hours:  If on nasal continuous positive airway pressure, respiratory therapy assess nares and determine need for appliance change or resting period  Outcome: Progressing  Flowsheets (Taken 5/13/2025 0215)  Skin Integrity Remains Intact: Monitor for areas of redness and/or skin breakdown     Problem: Safety - Adult  Goal: Free from fall injury  Outcome: Progressing     Problem: ABCDS Injury Assessment  Goal: Absence of physical injury  Outcome: Progressing     Problem: Hematologic - Adult  Goal: Maintains hematologic stability  Outcome: Progressing  Flowsheets (Taken 5/13/2025 0215)  Maintains hematologic stability:   Assess for signs and symptoms of bleeding or hemorrhage   Monitor labs for bleeding or clotting disorders   Administer blood products/factors as ordered

## 2025-05-13 NOTE — ED PROVIDER NOTES
[facility] EMERGENCY DEPARTMENT  EMERGENCY DEPARTMENT ENCOUNTER       Pt Name: Mandi Whittaker  MRN: [id number]  Birthdate 1955  Date of evaluation: 5/12/2025  Provider: Rafael Mcfadden DO   PCP: Aniket Almanzar Jr., MD  Note Started: 12:25 AM 5/13/25     CHIEF COMPLAINT       Chief Complaint   Patient presents with   • Abdominal Pain     Patient ambulatory to triage c/o dark stool and abdominal pain. Patient was seen last Friday and reports that she continues to have the same symptoms. Family reports being concerned for a bleeding ulcer.         HISTORY OF PRESENT ILLNESS: 1 or more elements      History From: Patient  None     Mandi Whittaker is a 69 y.o. female who presents with cc of epigastric abdominal pain, black stools. Seen on 5/10 for similar, symptoms have continued.      Nursing Notes were all reviewed and agreed with or any disagreements were addressed in the HPI.       PAST HISTORY     Past Medical History:  Past Medical History:   Diagnosis Date   • Arthritis    • Asthma    • Cervical cancer (HCC)    • Colon cancer (HCC)     February 2016 - pt denies   • Fibromyalgia    • GERD (gastroesophageal reflux disease)    • Hypertension    • Osteoporosis    • Seizures (HCC)     as of 5/21/24 LAST SEIZURE 2013   • Ulcer          Past Surgical History:  Past Surgical History:   Procedure Laterality Date   • BREAST BIOPSY Right     negative surgical biopsy   • COLONOSCOPY N/A 4/16/2019    COLONOSCOPY performed by Willie Trent MD at [facility] ENDOSCOPY   • COLONOSCOPY  02/2016   • COLONOSCOPY N/A 5/22/2024    COLONOSCOPY performed by Dalton Hendricks MD at [facility] ENDOSCOPY   • HYSTERECTOMY (CERVIX STATUS UNKNOWN)     • NEUROLOGICAL SURGERY  2009    brain surgery for seizures       Family History:  Family History   Problem Relation Age of Onset   • Cancer Sister 52        breast   • Breast Cancer Sister         40's   • Suicide Sister    • Crohn's Disease Father    • Stroke Father    •

## 2025-05-14 ENCOUNTER — ANESTHESIA (OUTPATIENT)
Facility: HOSPITAL | Age: 70
DRG: 378 | End: 2025-05-14
Payer: MEDICARE

## 2025-05-14 ENCOUNTER — ANESTHESIA EVENT (OUTPATIENT)
Facility: HOSPITAL | Age: 70
DRG: 378 | End: 2025-05-14
Payer: MEDICARE

## 2025-05-14 LAB
ALBUMIN SERPL-MCNC: 2.6 G/DL (ref 3.5–5)
ALBUMIN/GLOB SERPL: 0.7 (ref 1.1–2.2)
ALP SERPL-CCNC: 58 U/L (ref 45–117)
ALT SERPL-CCNC: 22 U/L (ref 12–78)
ANION GAP SERPL CALC-SCNC: 9 MMOL/L (ref 2–12)
AST SERPL-CCNC: 15 U/L (ref 15–37)
BASOPHILS # BLD: 0.05 K/UL (ref 0–0.1)
BASOPHILS NFR BLD: 0.4 % (ref 0–1)
BILIRUB SERPL-MCNC: 0.5 MG/DL (ref 0.2–1)
BUN SERPL-MCNC: 23 MG/DL (ref 6–20)
BUN/CREAT SERPL: 19 (ref 12–20)
CALCIUM SERPL-MCNC: 9.3 MG/DL (ref 8.5–10.1)
CHLORIDE SERPL-SCNC: 100 MMOL/L (ref 97–108)
CO2 SERPL-SCNC: 25 MMOL/L (ref 21–32)
CREAT SERPL-MCNC: 1.24 MG/DL (ref 0.55–1.02)
DIFFERENTIAL METHOD BLD: ABNORMAL
EOSINOPHIL # BLD: 0.03 K/UL (ref 0–0.4)
EOSINOPHIL NFR BLD: 0.2 % (ref 0–7)
ERYTHROCYTE [DISTWIDTH] IN BLOOD BY AUTOMATED COUNT: 16.1 % (ref 11.5–14.5)
GLOBULIN SER CALC-MCNC: 3.9 G/DL (ref 2–4)
GLUCOSE SERPL-MCNC: 118 MG/DL (ref 65–100)
HCT VFR BLD AUTO: 22.4 % (ref 35–47)
HELIOBACTER PYLORI ID: NEGATIVE
HGB BLD-MCNC: 7.2 G/DL (ref 11.5–16)
IMM GRANULOCYTES # BLD AUTO: 0.08 K/UL (ref 0–0.04)
IMM GRANULOCYTES NFR BLD AUTO: 0.6 % (ref 0–0.5)
LYMPHOCYTES # BLD: 2.84 K/UL (ref 0.8–3.5)
LYMPHOCYTES NFR BLD: 19.9 % (ref 12–49)
MAGNESIUM SERPL-MCNC: 1.9 MG/DL (ref 1.6–2.4)
MCH RBC QN AUTO: 30.8 PG (ref 26–34)
MCHC RBC AUTO-ENTMCNC: 32.1 G/DL (ref 30–36.5)
MCV RBC AUTO: 95.7 FL (ref 80–99)
MONOCYTES # BLD: 1.05 K/UL (ref 0–1)
MONOCYTES NFR BLD: 7.4 % (ref 5–13)
NEUTS SEG # BLD: 10.19 K/UL (ref 1.8–8)
NEUTS SEG NFR BLD: 71.5 % (ref 32–75)
NRBC # BLD: 0.06 K/UL (ref 0–0.01)
NRBC BLD-RTO: 0.4 PER 100 WBC
OSMOLALITY UR: 486 MOSM/KG H2O
PLATELET # BLD AUTO: 318 K/UL (ref 150–400)
PMV BLD AUTO: 9.2 FL (ref 8.9–12.9)
POTASSIUM SERPL-SCNC: 3.1 MMOL/L (ref 3.5–5.1)
PROT SERPL-MCNC: 6.5 G/DL (ref 6.4–8.2)
RBC # BLD AUTO: 2.34 M/UL (ref 3.8–5.2)
SODIUM SERPL-SCNC: 134 MMOL/L (ref 136–145)
SODIUM UR-SCNC: 27 MMOL/L
WBC # BLD AUTO: 14.2 K/UL (ref 3.6–11)

## 2025-05-14 PROCEDURE — 2709999900 HC NON-CHARGEABLE SUPPLY: Performed by: INTERNAL MEDICINE

## 2025-05-14 PROCEDURE — 6370000000 HC RX 637 (ALT 250 FOR IP): Performed by: INTERNAL MEDICINE

## 2025-05-14 PROCEDURE — 7100000011 HC PHASE II RECOVERY - ADDTL 15 MIN: Performed by: INTERNAL MEDICINE

## 2025-05-14 PROCEDURE — 6360000002 HC RX W HCPCS: Performed by: STUDENT IN AN ORGANIZED HEALTH CARE EDUCATION/TRAINING PROGRAM

## 2025-05-14 PROCEDURE — 3700000000 HC ANESTHESIA ATTENDED CARE: Performed by: INTERNAL MEDICINE

## 2025-05-14 PROCEDURE — 2580000003 HC RX 258: Performed by: NURSE PRACTITIONER

## 2025-05-14 PROCEDURE — 88312 SPECIAL STAINS GROUP 1: CPT

## 2025-05-14 PROCEDURE — 83735 ASSAY OF MAGNESIUM: CPT

## 2025-05-14 PROCEDURE — 7100000010 HC PHASE II RECOVERY - FIRST 15 MIN: Performed by: INTERNAL MEDICINE

## 2025-05-14 PROCEDURE — 85025 COMPLETE CBC W/AUTO DIFF WBC: CPT

## 2025-05-14 PROCEDURE — 2580000003 HC RX 258: Performed by: STUDENT IN AN ORGANIZED HEALTH CARE EDUCATION/TRAINING PROGRAM

## 2025-05-14 PROCEDURE — 3600007512: Performed by: INTERNAL MEDICINE

## 2025-05-14 PROCEDURE — 2500000003 HC RX 250 WO HCPCS: Performed by: STUDENT IN AN ORGANIZED HEALTH CARE EDUCATION/TRAINING PROGRAM

## 2025-05-14 PROCEDURE — 88305 TISSUE EXAM BY PATHOLOGIST: CPT

## 2025-05-14 PROCEDURE — 6360000002 HC RX W HCPCS: Performed by: HOSPITALIST

## 2025-05-14 PROCEDURE — 6360000002 HC RX W HCPCS: Performed by: ANESTHESIOLOGY

## 2025-05-14 PROCEDURE — 0DB38ZX EXCISION OF LOWER ESOPHAGUS, VIA NATURAL OR ARTIFICIAL OPENING ENDOSCOPIC, DIAGNOSTIC: ICD-10-PCS | Performed by: INTERNAL MEDICINE

## 2025-05-14 PROCEDURE — 36415 COLL VENOUS BLD VENIPUNCTURE: CPT

## 2025-05-14 PROCEDURE — 2500000003 HC RX 250 WO HCPCS: Performed by: NURSE PRACTITIONER

## 2025-05-14 PROCEDURE — 6370000000 HC RX 637 (ALT 250 FOR IP): Performed by: STUDENT IN AN ORGANIZED HEALTH CARE EDUCATION/TRAINING PROGRAM

## 2025-05-14 PROCEDURE — 0DB68ZX EXCISION OF STOMACH, VIA NATURAL OR ARTIFICIAL OPENING ENDOSCOPIC, DIAGNOSTIC: ICD-10-PCS | Performed by: INTERNAL MEDICINE

## 2025-05-14 PROCEDURE — 3600007502: Performed by: INTERNAL MEDICINE

## 2025-05-14 PROCEDURE — 3700000001 HC ADD 15 MINUTES (ANESTHESIA): Performed by: INTERNAL MEDICINE

## 2025-05-14 PROCEDURE — 2060000000 HC ICU INTERMEDIATE R&B

## 2025-05-14 PROCEDURE — 80053 COMPREHEN METABOLIC PANEL: CPT

## 2025-05-14 RX ORDER — SODIUM CHLORIDE 0.9 % (FLUSH) 0.9 %
5-40 SYRINGE (ML) INJECTION EVERY 12 HOURS SCHEDULED
Status: DISCONTINUED | OUTPATIENT
Start: 2025-05-14 | End: 2025-05-14 | Stop reason: HOSPADM

## 2025-05-14 RX ORDER — SODIUM CHLORIDE 0.9 % (FLUSH) 0.9 %
5-40 SYRINGE (ML) INJECTION PRN
Status: DISCONTINUED | OUTPATIENT
Start: 2025-05-14 | End: 2025-05-14 | Stop reason: HOSPADM

## 2025-05-14 RX ORDER — SUCRALFATE 1 G/1
1 TABLET ORAL
Status: DISCONTINUED | OUTPATIENT
Start: 2025-05-14 | End: 2025-05-15 | Stop reason: HOSPADM

## 2025-05-14 RX ORDER — LIDOCAINE HYDROCHLORIDE 20 MG/ML
INJECTION, SOLUTION EPIDURAL; INFILTRATION; INTRACAUDAL; PERINEURAL
Status: DISCONTINUED | OUTPATIENT
Start: 2025-05-14 | End: 2025-05-14 | Stop reason: SDUPTHER

## 2025-05-14 RX ORDER — POTASSIUM CHLORIDE 7.45 MG/ML
10 INJECTION INTRAVENOUS
Status: DISPENSED | OUTPATIENT
Start: 2025-05-14 | End: 2025-05-14

## 2025-05-14 RX ORDER — SODIUM CHLORIDE 9 MG/ML
INJECTION, SOLUTION INTRAVENOUS PRN
Status: DISCONTINUED | OUTPATIENT
Start: 2025-05-14 | End: 2025-05-14 | Stop reason: HOSPADM

## 2025-05-14 RX ORDER — POTASSIUM CHLORIDE 1500 MG/1
40 TABLET, EXTENDED RELEASE ORAL ONCE
Status: DISCONTINUED | OUTPATIENT
Start: 2025-05-14 | End: 2025-05-14

## 2025-05-14 RX ORDER — POTASSIUM CHLORIDE 7.45 MG/ML
10 INJECTION INTRAVENOUS
Status: COMPLETED | OUTPATIENT
Start: 2025-05-14 | End: 2025-05-14

## 2025-05-14 RX ADMIN — SODIUM CHLORIDE, PRESERVATIVE FREE 40 MG: 5 INJECTION INTRAVENOUS at 04:41

## 2025-05-14 RX ADMIN — POTASSIUM CHLORIDE 10 MEQ: 7.46 INJECTION, SOLUTION INTRAVENOUS at 12:28

## 2025-05-14 RX ADMIN — SUCRALFATE 1 G: 1 TABLET ORAL at 11:52

## 2025-05-14 RX ADMIN — SODIUM CHLORIDE, PRESERVATIVE FREE 10 ML: 5 INJECTION INTRAVENOUS at 10:57

## 2025-05-14 RX ADMIN — LEVETIRACETAM 3000 MG: 500 TABLET, FILM COATED ORAL at 10:55

## 2025-05-14 RX ADMIN — SUCRALFATE 1 G: 1 TABLET ORAL at 16:57

## 2025-05-14 RX ADMIN — LEVETIRACETAM 3000 MG: 500 TABLET, FILM COATED ORAL at 20:22

## 2025-05-14 RX ADMIN — POTASSIUM CHLORIDE 10 MEQ: 7.46 INJECTION, SOLUTION INTRAVENOUS at 10:54

## 2025-05-14 RX ADMIN — POTASSIUM CHLORIDE 10 MEQ: 7.46 INJECTION, SOLUTION INTRAVENOUS at 14:58

## 2025-05-14 RX ADMIN — PROPOFOL 220 MG: 10 INJECTION, EMULSION INTRAVENOUS at 09:34

## 2025-05-14 RX ADMIN — SERTRALINE 50 MG: 50 TABLET, FILM COATED ORAL at 10:55

## 2025-05-14 RX ADMIN — SODIUM CHLORIDE: 0.9 INJECTION, SOLUTION INTRAVENOUS at 08:59

## 2025-05-14 RX ADMIN — SODIUM CHLORIDE, PRESERVATIVE FREE 40 MG: 5 INJECTION INTRAVENOUS at 16:58

## 2025-05-14 RX ADMIN — LIDOCAINE HYDROCHLORIDE 80 MG: 20 INJECTION, SOLUTION EPIDURAL; INFILTRATION; INTRACAUDAL; PERINEURAL at 09:23

## 2025-05-14 RX ADMIN — LACOSAMIDE 150 MG: 50 TABLET, FILM COATED ORAL at 20:22

## 2025-05-14 RX ADMIN — SODIUM CHLORIDE, PRESERVATIVE FREE 10 ML: 5 INJECTION INTRAVENOUS at 20:23

## 2025-05-14 RX ADMIN — LACOSAMIDE 150 MG: 50 TABLET, FILM COATED ORAL at 10:55

## 2025-05-14 RX ADMIN — SUCRALFATE 1 G: 1 TABLET ORAL at 20:22

## 2025-05-14 RX ADMIN — SODIUM CHLORIDE: 0.9 INJECTION, SOLUTION INTRAVENOUS at 12:27

## 2025-05-14 RX ADMIN — POTASSIUM CHLORIDE 10 MEQ: 7.46 INJECTION, SOLUTION INTRAVENOUS at 17:05

## 2025-05-14 ASSESSMENT — PAIN - FUNCTIONAL ASSESSMENT: PAIN_FUNCTIONAL_ASSESSMENT: NONE - DENIES PAIN

## 2025-05-14 ASSESSMENT — PAIN SCALES - GENERAL
PAINLEVEL_OUTOF10: 0

## 2025-05-14 NOTE — PROGRESS NOTES
End of Shift Note    Bedside shift change report given to Genesis (oncoming nurse) by Zita Harmon RN (offgoing nurse).  Report included the following information SBAR    Shift worked:  7a-7p     Shift summary and any significant changes:     EDG completed. Sucralfate ordered. Plan is to consult surgery if medication doesn't resolve ucler. K+  replacement. fluids d/c     Concerns for physician to address:         Zone phone for oncoming shift:            Activity:  Level of Assistance: Minimal assist, patient does 75% or more  Number times ambulated in hallways past shift: 0  Number of times OOB to chair past shift: 1    Cardiac:   Cardiac Monitoring: Yes      Cardiac Rhythm: Sinus rhythm    Access:  Current line(s): PIV     Genitourinary:   Urinary Status: Voiding, Bathroom privileges    Respiratory:   O2 Device: None (Room air)  Chronic home O2 use?: NO  Incentive spirometer at bedside: NO    GI:  Last BM (including prior to admit):  (PTA, pt currently unable to recall)  Current diet:  ADULT DIET; Dysphagia - Soft and Bite Sized  Passing flatus: YES    Pain Management:   Patient states pain is manageable on current regimen: YES    Skin:  Kelby Scale Score: 18  Interventions: Wound Offloading (Prevention Methods): Bed, pressure reduction mattress, Turning, Repositioning    Patient Safety:  Fall Risk: Nursing Judgement-Fall Risk High(Add Comments): Yes  Fall Risk Interventions  Nursing Judgement-Fall Risk High(Add Comments): Yes  Toilet Every 2 Hours-In Advance of Need: Yes  Hourly Visual Checks: Awake, In bed  Fall Visual Posted: Fall sign posted, Socks  Room Door Open: Yes  Alarm On: Bed  Patient Moved Closer to Nursing Station: No    Active Consults:   IP CONSULT TO GI    Length of Stay:  Expected LOS: 4  Actual LOS: 1    Zita Harmon, RN

## 2025-05-14 NOTE — PROGRESS NOTES
End of Shift Note    Bedside shift change report given to ROCIO Ahumada (oncoming nurse) by Genesis Galeano RN (offgoing nurse).  Report included the following information SBAR, Kardex, ED Summary, Intake/Output, MAR, and Recent Results    Shift worked:  Night      Shift summary and any significant changes:     NPO since midnight for EGD today. Pt frequently forgetful and sometimes impulsive; easily redirectable.      Concerns for physician to address:       Zone phone for oncoming shift:          Activity:  Level of Assistance: Minimal assist, patient does 75% or more  Number times ambulated in hallways past shift: 0  Number of times OOB to chair past shift: 0    Cardiac:   Cardiac Monitoring: Yes      Cardiac Rhythm: Sinus rhythm    Access:  Current line(s): PIV     Genitourinary:   Urinary Status: Voiding    Respiratory:   O2 Device: None (Room air)  Chronic home O2 use?: NO  Incentive spirometer at bedside: NO    GI:  Last BM (including prior to admit):  (PTA, pt currently unable to recall)  Current diet:  ADULT DIET; Clear Liquid; No red dye  Diet NPO  Passing flatus: YES    Pain Management:   Patient states pain is manageable on current regimen: YES    Skin:  Kelby Scale Score: 18  Interventions: Wound Offloading (Prevention Methods): Bed, pressure reduction mattress, Turning, Repositioning    Patient Safety:  Fall Risk: Nursing Judgement-Fall Risk High(Add Comments): Yes  Fall Risk Interventions  Nursing Judgement-Fall Risk High(Add Comments): Yes  Toilet Every 2 Hours-In Advance of Need: Yes  Hourly Visual Checks: Awake, In bed  Fall Visual Posted: Fall sign posted, Socks  Room Door Open: Yes  Alarm On: Bed  Patient Moved Closer to Nursing Station: No    Active Consults:   IP CONSULT TO GI    Length of Stay:  Expected LOS: 4  Actual LOS: 0    Genesis Galeano RN

## 2025-05-14 NOTE — PLAN OF CARE
Problem: Discharge Planning  Goal: Discharge to home or other facility with appropriate resources  Outcome: Progressing  Flowsheets (Taken 5/14/2025 0804)  Discharge to home or other facility with appropriate resources:   Identify barriers to discharge with patient and caregiver   Arrange for needed discharge resources and transportation as appropriate   Identify discharge learning needs (meds, wound care, etc)   Refer to discharge planning if patient needs post-hospital services based on physician order or complex needs related to functional status, cognitive ability or social support system     Problem: Pain  Goal: Verbalizes/displays adequate comfort level or baseline comfort level  Outcome: Progressing     Problem: Skin/Tissue Integrity  Goal: Skin integrity remains intact  Description: 1.  Monitor for areas of redness and/or skin breakdown2.  Assess vascular access sites hourly3.  Every 4-6 hours minimum:  Change oxygen saturation probe site4.  Every 4-6 hours:  If on nasal continuous positive airway pressure, respiratory therapy assess nares and determine need for appliance change or resting period  Outcome: Progressing  Flowsheets (Taken 5/14/2025 0804)  Skin Integrity Remains Intact:   Assess vascular access sites hourly   Monitor for areas of redness and/or skin breakdown   Every 4-6 hours minimum:  Change oxygen saturation probe site   Turn and reposition as indicated   Every 4-6 hours:  If on nasal continuous positive airway pressure, assess nares and determine need for appliance change or resting period   Assess need for specialty bed   Positioning devices   Pressure redistribution bed/mattress (bed type)   Check visual cues for pain   Monitor skin under medical devices     Problem: Safety - Adult  Goal: Free from fall injury  Outcome: Progressing     Problem: ABCDS Injury Assessment  Goal: Absence of physical injury  Outcome: Progressing     Problem: Hematologic - Adult  Goal: Maintains hematologic

## 2025-05-14 NOTE — PROGRESS NOTES
Hospitalist Progress Note    NAME:   Mandi Whittaker   : 1955   MRN: [id number]     Date/Time: 2025 4:45 PM  Patient PCP: Aniket Almanzar Jr., MD    Estimated discharge date: 5/15-  Barriers: EGD, Hb stability      Assessment / Plan:    Upper GI bleeding   Acute on chronic blood loss anemia   Likely in setting of frequent NSAID use recently for hip pain   - Hold all NSAIDs  - Hold VTE ppx overnight  - Trend Hb  - IV PPI BID   - : S/p EGD which showed a gigantic clean-based ulcer noted in the deep duodenal bulb and then goes most of the circumference.  Also not amenable to any endoscopic therapy.  Will continue Protonix 40 mg IV twice daily and sucralfate 1 g 4 times daily  For repeat EGD in 6 to 8 weeks to assess healing  Will consult interventional radiology and surgery if there is recurrent acute bleeding    Hyponatremia  - possibly secondary to decreased po intake  - This is improving on IV fluids  -Repeat BMP in a.m. and DC IV fluids     Seizure disorder  - Continue home lacosamide  - Continue home keppra - discussed with pharmacy, confirmed she has been filling 3000mg BID as outpatient     HTN  - Blood pressure stable.  Can resume home Norvasc milligrams p.o. daily.  Hold HCTZ    Hypokalemia; replete as needed as     Leukocytosis; suspect reactionary  Patient is afebrile      Medical Decision Making:    [x] High (any 2)     A. Problems (any 1)  [x] Acute/Chronic Illness/injury posing threat to life or bodily function:  Acute anemia  [] Severe exacerbation of chronic illness:    ---------------------------------------------------------------------  B. Risk of Treatment (any 1)   [] Drugs/treatments that require intensive monitoring for toxicity include:    [] IV ABX requiring serial renal monitoring for nephrotoxicity:     [] IV Narcotic analgesia for adverse drug reaction  [] Aggressive IV diuresis requiring serial monitoring for renal impairment and electrolyte derangements  []

## 2025-05-14 NOTE — ANESTHESIA PRE PROCEDURE
Department of Anesthesiology  Preprocedure Note       Name:  Mandi Whittaker   Age:  69 y.o.  :  1955                                          MRN:  [id number]         Date:  2025      Surgeon: Surgeon(s):  Dalton Hendricks MD    Procedure: Procedure(s):  ESOPHAGOGASTRODUODENOSCOPY DIAGNOSTIC ONLY    Medications prior to admission:   Prior to Admission medications    Medication Sig Start Date End Date Taking? Authorizing Provider   potassium chloride (KLOR-CON M) 20 MEQ extended release tablet Take 1 tablet by mouth daily 5/10/25  Yes Jos Ledezma MD   predniSONE (DELTASONE) 10 MG tablet Take 5tab(50mg)x3days, 4tab(40mg)x3days, 3tab(30mg)x3days, 2tab(20mg)x2days, 1tab(10mg)x2days. #42 25  Yes Deric Cerrato MD   lacosamide (VIMPAT) 150 MG TABS tablet Take 1 tablet by mouth 2 times daily for 360 days. Max Daily Amount: 300 mg 10/28/24 10/23/25 Yes Ingris Moreno APRN - NP   albuterol sulfate HFA (PROVENTIL;VENTOLIN;PROAIR) 108 (90 Base) MCG/ACT inhaler INHALE 2 PUFFS EVERY 6 HOURS AS NEEDED FOR WHEEZE 24  Yes Ingris Moreno APRN - NP   levETIRAcetam (KEPPRA) 1000 MG tablet TAKE 3 TABLETS BY MOUTH TWICE A DAY 24  Yes Ingris Moreno APRN - NP   amLODIPine (NORVASC) 5 MG tablet Take 1 tablet by mouth daily   Yes Automatic Reconciliation, Ar   calcium carbonate (OS-JAMILA) 1250 (500 Ca) MG chewable tablet Take 1 tablet by mouth 2 times daily   Yes Automatic Reconciliation, Ar   denosumab (PROLIA) 60 MG/ML SOSY SC injection Inject 1 mL into the skin   Yes Automatic Reconciliation, Ar   fluticasone (FLONASE) 50 MCG/ACT nasal spray 1 spray by Nasal route as needed   Yes Automatic Reconciliation, Ar   hydroCHLOROthiazide (HYDRODIURIL) 12.5 MG tablet Take 1 tablet by mouth daily 12/13/15  Yes Automatic Reconciliation, Ar   loratadine-pseudoephedrine (CLARITIN-D 24 HOUR)  MG per extended release tablet Take 1 tablet by mouth daily   Yes Automatic Reconciliation, Ar   naproxen

## 2025-05-14 NOTE — PROGRESS NOTES
Endoscopy recovery  Patient returned to baseline, vital signs stable (see vital sign flowsheet). Patient offered liquids and tolerated well. Respiratory status within defined limits. Abdomen soft not tender. Skin with in defined limits.     1040-Pt returned back to room 2315 via transport.

## 2025-05-14 NOTE — OP NOTE
[place] Office: [phone]      Esophagogastroduodenoscopy Procedure Note      Mandi Whittaker  1955  [id number]    Indication: Melena/hematochezia, Anemia, hx of NSAIDs/Steroid use      : Kartik Hendricks MD    Referring Provider:  Aniket Almanzar Jr., MD    Sedation:  MAC anesthesia    Procedure Details:  After detailed informed consent was obtained for the procedure, with all risks and benefits of procedure explained the patient was taken to the endoscopy suite and placed in the left lateral decubitus position.  Following sequential administration of sedation as per above, the endoscope was inserted into the mouth and advanced under direct vision to second portion of the duodenum.  A careful inspection was made as the gastroscope was withdrawn, including a retroflexed view of the proximal stomach; findings and interventions are described below.      Findings:     Esophagus:  The esophageal mucosa in the proximal esophagus is normal.   Multiple, long and continuous ulcers are noted in the mid to lower esophagus with significant/LA grade D distal esophagitis.  Mucosal biopsies taken of these ulcers.  Ulcers are not actively bleeding.  The squamo-columnar junction is at 40 cm where the Z-line was noted.   A 2 cm sliding hiatal hernia is noted.    Stomach:   Few linear, gastric erosions are noted in the mid body.  Biopsies taken of the body and the antrum  The gastric mucosa has erythema in the body and antrum.  Biopsies taken for SHREYAS testing  The fundus was found to be normal with no lesions noted on retroflexion.  Endoscopic grading of gastroesophageal flap valve (GEFV)/Hill ndgndrndanddndend:nd nd2nd The angularis is normal as well.    Duodenum:   A 'gigantic', clean based ulcer, is noted in the deep duodenal bulb/D2 junction and engulfs most of the circumference.  Size measurement is not possible.    Ulcer is not amenable to any endoscopic therapy.  I had some difficulty getting into the second portion of

## 2025-05-14 NOTE — PROGRESS NOTES
Physician Progress Note      PATIENT:               SILVIA ANDREWS  CSN #:                  [id number]  :                       1955  ADMIT DATE:       2025 8:38 PM  DISCH DATE:  RESPONDING  PROVIDER #:        Sanaz Shipley MD          QUERY TEXT:    Anemia is documented in the medical record per ED provider PN 25. Please   specify the type:    The clinical indicators include:  68 yo F to ED with abdominal pain. Noted to have GIB received PRBC.  Hgb 6.5    25 ED Provider PN \"Acute blood loss anemia.\"     H&P \"Upper GI bleeding  Acute on chronic anemia  Likely in setting of frequent NSAID use recently for hip pain  - Hold all NSAIDs  - Hold VTE ppx overnight  - Transfusing RBC currently, will continue and check post-transfusion H&H  - IV PPI BID  - NPO  - GI consulted, appreciate assistance\"    EGD Pending  Options provided:  -- Related to acute blood loss  -- Related to acute on chronic blood loss  -- Other - I will add my own diagnosis  -- Disagree - Not applicable / Not valid  -- Disagree - Clinically unable to determine / Unknown  -- Refer to Clinical Documentation Reviewer    PROVIDER RESPONSE TEXT:    The patient's anemia is related to acute blood loss.    Query created by: Sammy Andrews on 2025 12:17 PM      Electronically signed by:  Sanaz Shipley MD 2025 8:59 PM

## 2025-05-14 NOTE — PROGRESS NOTES
Hospitalist Progress Note    NAME:   Mandi Whittaker   : 1955   MRN: [id number]     Date/Time: 2025 8:59 PM  Patient PCP: Aniket Almanzar Jr., MD    Estimated discharge date: 5/15-  Barriers: EGD, Hb stability      Assessment / Plan:    Upper GI bleeding   Acute on chronic anemia   Likely in setting of frequent NSAID use recently for hip pain   - Hold all NSAIDs  - Hold VTE ppx overnight  - Trend Hb  - IV PPI BID   - Diet per GI-clears, NPO at midnight for EGD   - GI consulted, appreciate assistance     Hyponatremia  - possibly secondary to decreased po intake  - IVF overnight while NPO  - Ulytes ordered, if Na worsens may need nephrology depending  - Hold home diuretics     Seizure disorder  - Continue home lacosamide  - Continue home keppra - discussed with pharmacy, confirmed she has been filling 3000mg BID as outpatient  - Check keppra level      HTN  - Hold home amlodipine, HCTZ, olmesartan in setting of bleeding      MCI  - Delirium precautions       Medical Decision Making:    [x] High (any 2)     A. Problems (any 1)  [x] Acute/Chronic Illness/injury posing threat to life or bodily function:  Acute anemia  [] Severe exacerbation of chronic illness:    ---------------------------------------------------------------------  B. Risk of Treatment (any 1)   [] Drugs/treatments that require intensive monitoring for toxicity include:    [] IV ABX requiring serial renal monitoring for nephrotoxicity:     [] IV Narcotic analgesia for adverse drug reaction  [] Aggressive IV diuresis requiring serial monitoring for renal impairment and electrolyte derangements  [] Critical electrolyte abnormalities requiring IV replacement and close serial monitoring  [] Insulin - monitoring serial FSBS for Hypoglycemic adverse drug reaction  [] Other -   [] Change in code status:    [] Decision to escalate care:    [] Major surgery/procedure with associated risk factors:     05/12/25 2330 127/78 -- -- 93 16 100 % -- --   05/12/25 2314 -- -- -- -- -- -- 1.6 m (5' 3\") 79.8 kg (175 lb 14.8 oz)   05/12/25 2312 136/75 -- -- 100 23 100 % -- --   05/12/25 2304 121/82 98.5 °F (36.9 °C) -- -- 18 100 % -- --   05/12/25 2246 -- 98 °F (36.7 °C) -- -- 18 100 % -- --   05/12/25 2230 118/76 -- -- 100 -- 100 % -- --   05/12/25 2200 123/71 -- -- 98 -- 94 % -- --   05/12/25 2130 136/81 -- -- (!) 101 -- 100 % -- --   05/12/25 2100 (!) 117/95 -- -- 99 -- 99 % -- --         Intake/Output Summary (Last 24 hours) at 5/13/2025 2059  Last data filed at 5/13/2025 0302  Gross per 24 hour   Intake 410 ml   Output --   Net 410 ml        I had a face to face encounter and independently examined this patient on 5/13/2025, as outlined below:  PHYSICAL EXAM:  General: Alert, cooperative  EENT:  EOMI. Anicteric sclerae.  Resp:  CTA bilaterally, no wheezing or rales.  No accessory muscle use  CV:  Regular  rhythm,  No edema  GI:  Soft, Non distended, epigastric tenderness.  +Bowel sounds  Neurologic:  Alert and oriented X 3, normal speech,   Psych:   Not anxious nor agitated  Skin:  No rashes.  No jaundice    Reviewed most current lab test results and cultures  YES  Reviewed most current radiology test results   YES  Review and summation of old records today    NO  Reviewed patient's current orders and MAR    YES  PMH/SH reviewed - no change compared to H&P    Procedures: see electronic medical records for all procedures/Xrays and details which were not copied into this note but were reviewed prior to creation of Plan.      LABS:  I reviewed today's most current labs and imaging studies.  Pertinent labs include:  Recent Labs     05/12/25 1927 05/13/25  0316   WBC 20.4* 21.7*   HGB 6.5* 8.2*   HCT 20.3* 26.2*    281     Recent Labs     05/12/25 2159 05/13/25 0316   * 128*   K 2.9* 3.7   CL 97 95*   CO2 28 24   GLUCOSE 139* 119*   BUN 44* 40*   CREATININE 1.39* 1.32*   CALCIUM 9.9 9.5   BILITOT 0.5 0.8

## 2025-05-14 NOTE — H&P
Pre-endoscopy H and P    The patient was seen and examined in the room/pre-op holding area.  The airway was assessed and documented.  The problem list, past medical history, and medications were reviewed.     Patient Active Problem List   Diagnosis    MCI (mild cognitive impairment)    Localization-related epilepsy, intractable (HCC)    Severe obesity (HCC)    Obstructive sleep apnea (adult) (pediatric)    GI bleed     Social History     Socioeconomic History    Marital status:      Spouse name: Not on file    Number of children: Not on file    Years of education: Not on file    Highest education level: Not on file   Occupational History    Not on file   Tobacco Use    Smoking status: Former     Current packs/day: 0.00     Types: Cigarettes     Quit date: 2019     Years since quittin.2    Smokeless tobacco: Never   Substance and Sexual Activity    Alcohol use: No    Drug use: No    Sexual activity: Not on file   Other Topics Concern    Not on file   Social History Narrative    Not on file     Social Drivers of Health     Financial Resource Strain: Not on file   Food Insecurity: No Food Insecurity (2025)    Hunger Vital Sign     Worried About Running Out of Food in the Last Year: Never true     Ran Out of Food in the Last Year: Never true   Transportation Needs: No Transportation Needs (2025)    PRAPARE - Transportation     Lack of Transportation (Medical): No     Lack of Transportation (Non-Medical): No   Physical Activity: Not on file   Stress: Not on file   Social Connections: Not on file   Intimate Partner Violence: Not on file   Housing Stability: Low Risk  (2025)    Housing Stability Vital Sign     Unable to Pay for Housing in the Last Year: No     Number of Times Moved in the Last Year: 0     Homeless in the Last Year: No     Past Medical History:   Diagnosis Date    Arthritis     Asthma     Cervical cancer (HCC)     Colon cancer (HCC)     2016 - pt denies     daily   predniSONE (DELTASONE) 10 MG tablet Past Week  No Yes   Sig: Take 5tab(50mg)x3days, 4tab(40mg)x3days, 3tab(30mg)x3days, 2tab(20mg)x2days, 1tab(10mg)x2days. #42   sertraline (ZOLOFT) 50 MG tablet Unknown  No No   Sig: TAKE 1 TABLET BY MOUTH EVERY DAY      Facility-Administered Medications: None           The review of systems is:  Negative  for shortness of breath or chest pain      The heart, lungs, and mental status were satisfactory for the administration of deep sedation and for the procedure.      I discussed with the patient the objectives, risks, consequences and alternatives to the procedure.      Dalton Hendricks MD  5/14/2025  9:09 AM

## 2025-05-14 NOTE — PERIOP NOTE
ARRIVAL INFORMATION:  Verified patient name and date of birth, scheduled procedure, and informed consent.     Belongings with patient include:  Hat, 3 rings, necklace     GI FOCUSED ASSESSMENT:  Neuro: Awake, alert, oriented x4  Respiratory: even and unlabored   GI: soft and non-distended  EKG Rhythm: normal sinus rhythm    Education: The risks and benefits of the bite block have been explained to patient.  Patient verbalizes understanding.  Reviewed post procedure instructions

## 2025-05-14 NOTE — ANESTHESIA POSTPROCEDURE EVALUATION
Department of Anesthesiology  Postprocedure Note    Patient: Mandi Whittaker  MRN: [id number]  YOB: 1955  Date of evaluation: 5/14/2025    Procedure Summary       Date: 05/14/25 Room / Location: [facility] ENDO 01 / [facility] ENDOSCOPY    Anesthesia Start: 0917 Anesthesia Stop: 0937    Procedure: ESOPHAGOGASTRODUODENOSCOPY DIAGNOSTIC ONLY (Upper GI Region) Diagnosis:       Anemia, unspecified type      Melena      (Anemia, unspecified type [D64.9])      (Melena [K92.1])    Surgeons: Dalton Hendricks MD Responsible Provider: Romana Whittaker DO    Anesthesia Type: TIVA ASA Status: 3            Anesthesia Type: TIVA    Afua Phase I: Afua Score: 10    Afua Phase II:      Anesthesia Post Evaluation    Patient location during evaluation: PACU  Patient participation: complete - patient participated  Level of consciousness: awake  Airway patency: patent  Nausea & Vomiting: no vomiting  Cardiovascular status: hemodynamically stable  Respiratory status: acceptable  Hydration status: euvolemic    No notable events documented.

## 2025-05-14 NOTE — PROGRESS NOTES
Rapid H Pylori obtained.       0932: Endoscopy Case End Note:     Procedure scope was pre-cleaned, per protocol, at bedside by AVILA Kaur.       Report received from anesthesia.  See anesthesia flowsheet for intra-procedure vital signs and events.    Belongings remain under stretcher with patient.

## 2025-05-15 VITALS
OXYGEN SATURATION: 96 % | DIASTOLIC BLOOD PRESSURE: 82 MMHG | BODY MASS INDEX: 31.17 KG/M2 | HEART RATE: 90 BPM | HEIGHT: 63 IN | RESPIRATION RATE: 17 BRPM | SYSTOLIC BLOOD PRESSURE: 139 MMHG | WEIGHT: 175.93 LBS | TEMPERATURE: 98.7 F

## 2025-05-15 LAB
ALBUMIN SERPL-MCNC: 2.4 G/DL (ref 3.5–5)
ALBUMIN/GLOB SERPL: 0.6 (ref 1.1–2.2)
ALP SERPL-CCNC: 56 U/L (ref 45–117)
ALT SERPL-CCNC: 21 U/L (ref 12–78)
ANION GAP SERPL CALC-SCNC: 7 MMOL/L (ref 2–12)
AST SERPL-CCNC: 15 U/L (ref 15–37)
BASOPHILS # BLD: 0.06 K/UL (ref 0–0.1)
BASOPHILS NFR BLD: 0.6 % (ref 0–1)
BILIRUB SERPL-MCNC: 0.4 MG/DL (ref 0.2–1)
BUN SERPL-MCNC: 11 MG/DL (ref 6–20)
BUN/CREAT SERPL: 10 (ref 12–20)
CALCIUM SERPL-MCNC: 9 MG/DL (ref 8.5–10.1)
CHLORIDE SERPL-SCNC: 108 MMOL/L (ref 97–108)
CO2 SERPL-SCNC: 23 MMOL/L (ref 21–32)
CREAT SERPL-MCNC: 1.14 MG/DL (ref 0.55–1.02)
DIFFERENTIAL METHOD BLD: ABNORMAL
EOSINOPHIL # BLD: 0.06 K/UL (ref 0–0.4)
EOSINOPHIL NFR BLD: 0.6 % (ref 0–7)
ERYTHROCYTE [DISTWIDTH] IN BLOOD BY AUTOMATED COUNT: 15.9 % (ref 11.5–14.5)
GLOBULIN SER CALC-MCNC: 3.7 G/DL (ref 2–4)
GLUCOSE SERPL-MCNC: 95 MG/DL (ref 65–100)
HCT VFR BLD AUTO: 22.1 % (ref 35–47)
HCT VFR BLD AUTO: 22.6 % (ref 35–47)
HGB BLD-MCNC: 7.2 G/DL (ref 11.5–16)
HGB BLD-MCNC: 7.4 G/DL (ref 11.5–16)
IMM GRANULOCYTES # BLD AUTO: 0.05 K/UL (ref 0–0.04)
IMM GRANULOCYTES NFR BLD AUTO: 0.5 % (ref 0–0.5)
LEVETIRACETAM SERPL-MCNC: <2 UG/ML (ref 10–40)
LYMPHOCYTES # BLD: 2.93 K/UL (ref 0.8–3.5)
LYMPHOCYTES NFR BLD: 31.5 % (ref 12–49)
MAGNESIUM SERPL-MCNC: 1.9 MG/DL (ref 1.6–2.4)
MCH RBC QN AUTO: 31.7 PG (ref 26–34)
MCHC RBC AUTO-ENTMCNC: 32.6 G/DL (ref 30–36.5)
MCV RBC AUTO: 97.4 FL (ref 80–99)
MONOCYTES # BLD: 0.82 K/UL (ref 0–1)
MONOCYTES NFR BLD: 8.8 % (ref 5–13)
NEUTS SEG # BLD: 5.39 K/UL (ref 1.8–8)
NEUTS SEG NFR BLD: 58 % (ref 32–75)
NRBC # BLD: 0.03 K/UL (ref 0–0.01)
NRBC BLD-RTO: 0.3 PER 100 WBC
PLATELET # BLD AUTO: 363 K/UL (ref 150–400)
PMV BLD AUTO: 9 FL (ref 8.9–12.9)
POTASSIUM SERPL-SCNC: 3.5 MMOL/L (ref 3.5–5.1)
PROT SERPL-MCNC: 6.1 G/DL (ref 6.4–8.2)
RBC # BLD AUTO: 2.27 M/UL (ref 3.8–5.2)
SODIUM SERPL-SCNC: 138 MMOL/L (ref 136–145)
WBC # BLD AUTO: 9.3 K/UL (ref 3.6–11)

## 2025-05-15 PROCEDURE — 80053 COMPREHEN METABOLIC PANEL: CPT

## 2025-05-15 PROCEDURE — 36415 COLL VENOUS BLD VENIPUNCTURE: CPT

## 2025-05-15 PROCEDURE — 85025 COMPLETE CBC W/AUTO DIFF WBC: CPT

## 2025-05-15 PROCEDURE — 85018 HEMOGLOBIN: CPT

## 2025-05-15 PROCEDURE — 2580000003 HC RX 258: Performed by: STUDENT IN AN ORGANIZED HEALTH CARE EDUCATION/TRAINING PROGRAM

## 2025-05-15 PROCEDURE — 6370000000 HC RX 637 (ALT 250 FOR IP): Performed by: STUDENT IN AN ORGANIZED HEALTH CARE EDUCATION/TRAINING PROGRAM

## 2025-05-15 PROCEDURE — 85014 HEMATOCRIT: CPT

## 2025-05-15 PROCEDURE — 6360000002 HC RX W HCPCS: Performed by: STUDENT IN AN ORGANIZED HEALTH CARE EDUCATION/TRAINING PROGRAM

## 2025-05-15 PROCEDURE — 83735 ASSAY OF MAGNESIUM: CPT

## 2025-05-15 PROCEDURE — 6370000000 HC RX 637 (ALT 250 FOR IP): Performed by: INTERNAL MEDICINE

## 2025-05-15 PROCEDURE — 2500000003 HC RX 250 WO HCPCS: Performed by: STUDENT IN AN ORGANIZED HEALTH CARE EDUCATION/TRAINING PROGRAM

## 2025-05-15 RX ORDER — SUCRALFATE 1 G/1
1 TABLET ORAL
Qty: 120 TABLET | Refills: 1 | Status: SHIPPED | OUTPATIENT
Start: 2025-05-15

## 2025-05-15 RX ORDER — PANTOPRAZOLE SODIUM 40 MG/1
40 TABLET, DELAYED RELEASE ORAL
Qty: 60 TABLET | Refills: 0 | Status: SHIPPED | OUTPATIENT
Start: 2025-05-15

## 2025-05-15 RX ADMIN — SUCRALFATE 1 G: 1 TABLET ORAL at 10:24

## 2025-05-15 RX ADMIN — SUCRALFATE 1 G: 1 TABLET ORAL at 05:28

## 2025-05-15 RX ADMIN — LEVETIRACETAM 3000 MG: 500 TABLET, FILM COATED ORAL at 08:41

## 2025-05-15 RX ADMIN — SERTRALINE 50 MG: 50 TABLET, FILM COATED ORAL at 08:42

## 2025-05-15 RX ADMIN — LACOSAMIDE 150 MG: 50 TABLET, FILM COATED ORAL at 08:42

## 2025-05-15 RX ADMIN — SODIUM CHLORIDE, PRESERVATIVE FREE 40 MG: 5 INJECTION INTRAVENOUS at 05:28

## 2025-05-15 RX ADMIN — SODIUM CHLORIDE, PRESERVATIVE FREE 10 ML: 5 INJECTION INTRAVENOUS at 08:42

## 2025-05-15 ASSESSMENT — PAIN SCALES - GENERAL
PAINLEVEL_OUTOF10: 0
PAINLEVEL_OUTOF10: 0

## 2025-05-15 NOTE — PROGRESS NOTES
Patient discharged and transported home via . Discharge instructions received and opportunity for questions and concerns provided.

## 2025-05-15 NOTE — PROGRESS NOTES
End of Shift Note    Bedside shift change report given to ROCIO Ahumada (oncoming nurse) by Genesis Galeano RN (offgoing nurse).  Report included the following information SBAR, Kardex, ED Summary, Intake/Output, MAR, and Recent Results    Shift worked:  Night      Shift summary and any significant changes:     No acute changes overnight. Hgb still stable at 7.2 this AM.      Concerns for physician to address:       Zone phone for oncoming shift:          Activity:  Level of Assistance: Minimal assist, patient does 75% or more  Number times ambulated in hallways past shift: 0  Number of times OOB to chair past shift: 0    Cardiac:   Cardiac Monitoring: Yes      Cardiac Rhythm: Sinus rhythm    Access:  Current line(s): PIV     Genitourinary:   Urinary Status: Voiding    Respiratory:   O2 Device: None (Room air)  Chronic home O2 use?: NO  Incentive spirometer at bedside: NO    GI:  Last BM (including prior to admit):  (PTA, pt currently unable to recall)  Current diet:  ADULT DIET; Dysphagia - Soft and Bite Sized  Passing flatus: YES    Pain Management:   Patient states pain is manageable on current regimen: YES    Skin:  Kelby Scale Score: 19  Interventions: Wound Offloading (Prevention Methods): Bed, pressure reduction mattress, Pillows, Repositioning    Patient Safety:  Fall Risk: Nursing Judgement-Fall Risk High(Add Comments): Yes  Fall Risk Interventions  Nursing Judgement-Fall Risk High(Add Comments): Yes  Toilet Every 2 Hours-In Advance of Need: Yes  Hourly Visual Checks: Awake, In bed  Fall Visual Posted: Fall sign posted, Socks  Room Door Open: Yes  Alarm On: Bed  Patient Moved Closer to Nursing Station: No    Active Consults:   IP CONSULT TO GI    Length of Stay:  Expected LOS: 4  Actual LOS: 1    Genesis Galeano RN

## 2025-05-15 NOTE — PLAN OF CARE
Problem: Discharge Planning  Goal: Discharge to home or other facility with appropriate resources  Outcome: Adequate for Discharge  Flowsheets (Taken 5/15/2025 0838)  Discharge to home or other facility with appropriate resources:   Arrange for needed discharge resources and transportation as appropriate   Identify barriers to discharge with patient and caregiver   Identify discharge learning needs (meds, wound care, etc)   Refer to discharge planning if patient needs post-hospital services based on physician order or complex needs related to functional status, cognitive ability or social support system     Problem: Pain  Goal: Verbalizes/displays adequate comfort level or baseline comfort level  Outcome: Adequate for Discharge     Problem: Skin/Tissue Integrity  Goal: Skin integrity remains intact  Description: 1.  Monitor for areas of redness and/or skin breakdown2.  Assess vascular access sites hourly3.  Every 4-6 hours minimum:  Change oxygen saturation probe site4.  Every 4-6 hours:  If on nasal continuous positive airway pressure, respiratory therapy assess nares and determine need for appliance change or resting period  Outcome: Adequate for Discharge  Flowsheets (Taken 5/15/2025 0838)  Skin Integrity Remains Intact:   Monitor for areas of redness and/or skin breakdown   Assess vascular access sites hourly   Every 4-6 hours minimum:  Change oxygen saturation probe site   Every 4-6 hours:  If on nasal continuous positive airway pressure, assess nares and determine need for appliance change or resting period   Turn and reposition as indicated   Assess need for specialty bed   Positioning devices   Pressure redistribution bed/mattress (bed type)   Check visual cues for pain   Monitor skin under medical devices     Problem: Pain  Goal: Verbalizes/displays adequate comfort level or baseline comfort level  Outcome: Adequate for Discharge     Problem: Safety - Adult  Goal: Free from fall injury  Outcome: Adequate  for Discharge     Problem: ABCDS Injury Assessment  Goal: Absence of physical injury  Outcome: Adequate for Discharge     Problem: Hematologic - Adult  Goal: Maintains hematologic stability  Outcome: Adequate for Discharge  Flowsheets (Taken 5/15/2025 6404)  Maintains hematologic stability:   Assess for signs and symptoms of bleeding or hemorrhage   Monitor labs for bleeding or clotting disorders   Administer blood products/factors as ordered

## 2025-05-15 NOTE — PROGRESS NOTES
Chelle Tapia NP-PATRICIA                       [phone] cell                 Monday-Thursday 7:30-5:00                               GI PROGRESS NOTE        NAME:   Mandi Whittaker       :    1955       MRN:    [id number]     Assessment/Plan     GI consultation for upper GI bleeding.  69-year-old female with PMH sacroiliitis, chronic hip pain, osteoporosis, asthma, cervical cancer, osteoarthritis, fibromyalgia, GERD, HTN, seizure disorder.  Came to ER with complaint of epigastric pain and melena--cannot tell me how long she has been having melena.  No nausea, vomiting, or GERD complaints. No weight loss or BRBPR. Has been taking large amounts of NSAIDs as well as steroids for her chronic hip pain.  Recently had steroid injection into SI joint.  She has never had an EGD.  Hgb down to 6.5 from 8.1.  INR 1.2, platelets 281, BUN 40, creatinine 1.32, WBC 21.  UA positive for UTI but culture negative.  CTAP with contrast without acute GI abnormality.  She had a screening colonoscopy 2024 for personal history of colon polyps and family history of colon polyps.  Fair prep, benign polyp, diverticulosis, and medium sized internal hemorrhoids.  Recommended repeating in 3 years.  History of TVA in 2016.  She does not use tobacco or alcohol.     Impression:  Melena  Anemia  NSAID use  Hyponatremia  Sacroiliitis  Chronic hip pain  Osteoporosis  Asthma  Hx of cervical cancer  OA  Fibromyalgia  GERD  HTN  Seizure disorder    5/15/2025: EGD yesterday by Dr. Hendricks:  Findings:      Esophagus:  The esophageal mucosa in the proximal esophagus is normal.   Multiple, long and continuous ulcers are noted in the mid to lower esophagus with significant/LA grade D distal esophagitis.  Mucosal biopsies taken of these ulcers.  Ulcers are not actively bleeding.  The squamo-columnar junction is at 40 cm where the Z-line was noted.   A 2 cm

## 2025-05-15 NOTE — DISCHARGE SUMMARY
Discharge Summary    Name: Mandi Whittaker  [id number]  YOB: 1955 (Age: 69 y.o.)   Date of Admission: 5/12/2025  Date of Discharge: 5/15/2025  Attending Physician: Aleida Lima MD    Discharge Diagnosis:   Acute on chronic blood loss anemia due to acute upper GI bleed in the setting of large duodenal ulcer  Hyponatremia  Seizure disorder  HTN  Hypokalemia  Leukocytosis    Consultations:  IP CONSULT TO GI      Brief Admission History/Reason for Admission Per Jose Meek MD:     Mandi Whittaker is a 69 y.o.  female with PMHx significant for  has a past medical history of Arthritis, Asthma, Cervical cancer (HCC), Colon cancer (HCC), Fibromyalgia, GERD (gastroesophageal reflux disease), Hypertension, Osteoporosis, Seizures (HCC), and Ulcer. who presents with the above chief complaint.      We were asked to admit for work up and further evaluation.      Patient is poor historian. Here today with chief complaint of fatigue and melena and abdominal pain. Accompanied by family who help provide history. Note that she has been complaining of abdominal pain for the past several weeks. Also they note she has been saying that her stool has been very dark.      Of note, seen in the ED on 5/10 also with complaints of abdominal pain at that time. She is unsure whether melena was ongoing at that time however on further questioning states she is \"pretty sure\" stool was dark then as well. Denies any nausea or vomiting.      Patient and family note that she has been taking ibuprofen regularly for hip pain. She is unsure exactly how often she has been taking this at home.      Brief Hospital Course by Main Problems:     Upper GI bleeding   Acute on chronic blood loss anemia   Likely in setting of frequent NSAID use recently for hip pain   5/14: S/p EGD which showed a gigantic clean-based ulcer noted in the deep duodenal bulb and then goes most of the circumference.  Also not amenable to  any endoscopic therapy.  Treated with Protonix 40 mg IV twice daily and sucralfate 1 g 4 times daily.  Patient has been cleared by GI to be discharged today.  Patient remains hemodynamically stable.  GI is recommending repeat EGD in 6 to 8 weeks to assess healing.  Avoid NSAIDs/steroids. Continue with PPI and carafate.      Hyponatremia  improved.  HCTZ had been on hold since admission.  Will discontinue at discharge it can contribute to low Na.  BP should be well controlled with current regimen.  Repeat labs outpt at PCP f/u.     Seizure disorder  Continue home lacosamide.  Continue home keppra.     HTN  Blood pressure stable.  Resuming other meds as  Hypokalemia; replaced, repeat labs outpt.  Leukocytosis; suspect reactive.  Resolved has remained afebrile.    Discharge Exam:  Patient seen and examined by me on discharge day.  Pertinent Findings:  Patient Vitals for the past 24 hrs:   BP Temp Temp src Pulse Resp SpO2   05/15/25 1134 139/82 98.7 °F (37.1 °C) Oral 90 17 96 %   05/15/25 0838 -- -- -- 77 -- --   05/15/25 0723 111/73 98.8 °F (37.1 °C) Oral 81 17 95 %   05/15/25 0315 118/73 97.6 °F (36.4 °C) Oral 89 22 97 %   05/14/25 2240 123/60 98 °F (36.7 °C) Oral 95 20 91 %   05/14/25 1910 117/77 98.2 °F (36.8 °C) Oral 92 21 91 %   05/14/25 1709 -- -- -- (!) 107 16 --   05/14/25 1708 -- -- -- 96 10 --   05/14/25 1700 -- -- -- 87 19 --   05/14/25 1534 -- -- -- (!) 103 19 --   05/14/25 1500 -- -- -- 88 18 --   05/14/25 1416 129/87 98.5 °F (36.9 °C) Oral 90 16 100 %       Gen:    Not in distress  Chest: Clear lungs  CVS:   Regular rhythm.  No edema  Abd:  Soft, not distended, not tender  Neuro:  awake, moving all exts    Discharge/Recent Laboratory Results:  Recent Labs     05/15/25  0538      K 3.5      CO2 23   BUN 11   CREATININE 1.14*   GLUCOSE 95   CALCIUM 9.0   MG 1.9     Recent Labs     05/15/25  0538 05/15/25  1032   HGB 7.2* 7.4*   HCT 22.1* 22.6*   WBC 9.3  --      --        Discharge

## 2025-05-15 NOTE — PROGRESS NOTES
Attempted to schedule a PCP hospital follow up. Unable to reach the office and unable to leave a voicemail. Pending patient discharge. Trena Walls, Care Management Assistant

## 2025-05-16 LAB
ABO + RH BLD: NORMAL
BLD PROD TYP BPU: NORMAL
BLD PROD TYP BPU: NORMAL
BLOOD BANK BLOOD PRODUCT EXPIRATION DATE: NORMAL
BLOOD BANK DISPENSE STATUS: NORMAL
BLOOD BANK DISPENSE STATUS: NORMAL
BLOOD BANK ISBT PRODUCT BLOOD TYPE: 7300
BLOOD BANK PRODUCT CODE: NORMAL
BLOOD BANK UNIT TYPE AND RH: NORMAL
BLOOD GROUP ANTIBODIES SERPL: NORMAL
BPU ID: NORMAL
BPU ID: NORMAL
CROSSMATCH RESULT: NORMAL
CROSSMATCH RESULT: NORMAL
SPECIMEN EXP DATE BLD: NORMAL
UNIT DIVISION: 0
UNIT DIVISION: 0
UNIT ISSUE DATE/TIME: NORMAL

## (undated) DEVICE — CATH IV AUTOGRD BC BLU 22GA 25 -- INSYTE

## (undated) DEVICE — SNARE ENDOSCP POLYP MED STD AD 2.4X27X240 CM 2.8 MM OVL SENS

## (undated) DEVICE — KENDALL RADIOLUCENT FOAM MONITORING ELECTRODE -RECTANGULAR SHAPE: Brand: KENDALL

## (undated) DEVICE — IV START KIT: Brand: MEDLINE

## (undated) DEVICE — Device: Brand: MEDICAL ACTION INDUSTRIES

## (undated) DEVICE — Device

## (undated) DEVICE — BAG SPEC BIOHZD LF 2MIL 6X10IN -- CONVERT TO ITEM 357326

## (undated) DEVICE — TIP SUCT TRNSPAR RIB SURF STD BLB RIG NVENT W/ 5IN1 CONN DYND50138] MEDLINE INDUSTRIES INC]

## (undated) DEVICE — SET ADMIN 16ML TBNG L100IN 2 Y INJ SITE IV PIGGY BK DISP

## (undated) DEVICE — SET GRAV CK VLV NEEDLESS ST 3 GANGED 4WAY STPCOCK HI FLO 10

## (undated) DEVICE — CUFF BLD PRSS AD CLTH SGL TB W/ BAYNT CONN ROUNDED CORNER

## (undated) DEVICE — Z DISCONTINUED USE 2751540 TUBING IRRIG L10IN DISP PMP ENDOGATOR

## (undated) DEVICE — ENDOSCOPIC KIT COMPLIANCE ENDOKIT

## (undated) DEVICE — ENDO CARRY-ON PROCEDURE KIT INCLUDES ENZYMATIC SPONGE, GAUZE, BIOHAZARD LABEL, TRAY, LUBRICANT, DIRTY SCOPE LABEL, WATER LABEL, TRAY, DRAWSTRING PAD, AND DEFENDO 4-PIECE KIT.: Brand: ENDO CARRY-ON PROCEDURE KIT

## (undated) DEVICE — Z DISCONTINUED NO SUB IDED SET EXTN W/ 4 W STPCOCK M SPIN LOK 36IN

## (undated) DEVICE — NEEDLE HYPO 18GA L1.5IN PNK S STL HUB POLYPR SHLD REG BVL

## (undated) DEVICE — BAG BELONG PT PERS CLEAR HANDL

## (undated) DEVICE — COVIDIEN KENDALL DL DISPOSABLE 3 LEAD SY: Brand: MEDLINE RENEWAL

## (undated) DEVICE — BITEBLOCK 54FR W/ DENT RIM BLOX

## (undated) DEVICE — SYR 50ML SLIP TIP NSAF LF STRL --

## (undated) DEVICE — CONTAINER SPEC 20 ML LID NEUT BUFF FORMALIN 10 % POLYPR STS

## (undated) DEVICE — QUILTED PREMIUM COMFORT UNDERPAD,EXTRA HEAVY: Brand: WINGS

## (undated) DEVICE — 1200 GUARD II KIT W/5MM TUBE W/O VAC TUBE: Brand: GUARDIAN

## (undated) DEVICE — FORCEPS BX L240CM JAW DIA2.8MM L CAP W/ NDL MIC MESH TOOTH

## (undated) DEVICE — CANN NASAL O2 CAPNOGRAPHY AD -- FILTERLINE

## (undated) DEVICE — BW-412T DISP COMBO CLEANING BRUSH: Brand: SINGLE USE COMBINATION CLEANING BRUSH

## (undated) DEVICE — WRISTBAND ID AD W1XL11.5IN RED POLY ALRG PREPRINTED PERM

## (undated) DEVICE — AIRLIFE™ U/CONNECT-IT OXYGEN TUBING 7 FEET (2.1 M) CRUSH-RESISTANT OXYGEN TUBING, VINYL TIPPED: Brand: AIRLIFE™

## (undated) DEVICE — CONNECTOR TBNG AUX H2O JET DISP FOR OLY 160/180 SER

## (undated) DEVICE — SOLIDIFIER FLUID 3000 CC ABSORB

## (undated) DEVICE — SYRINGE MED 20ML STD CLR PLAS LUERLOCK TIP N CTRL DISP